# Patient Record
Sex: MALE | Race: WHITE | NOT HISPANIC OR LATINO | URBAN - METROPOLITAN AREA
[De-identification: names, ages, dates, MRNs, and addresses within clinical notes are randomized per-mention and may not be internally consistent; named-entity substitution may affect disease eponyms.]

---

## 2017-03-20 ENCOUNTER — OUTPATIENT (OUTPATIENT)
Dept: OUTPATIENT SERVICES | Facility: HOSPITAL | Age: 77
LOS: 1 days | Discharge: HOME | End: 2017-03-20

## 2019-03-12 ENCOUNTER — OUTPATIENT (OUTPATIENT)
Dept: OUTPATIENT SERVICES | Facility: HOSPITAL | Age: 79
LOS: 1 days | End: 2019-03-12
Payer: MEDICARE

## 2019-03-12 PROCEDURE — 93010 ELECTROCARDIOGRAM REPORT: CPT

## 2019-03-25 ENCOUNTER — OUTPATIENT (OUTPATIENT)
Dept: OUTPATIENT SERVICES | Facility: HOSPITAL | Age: 79
LOS: 1 days | End: 2019-03-25

## 2021-10-25 ENCOUNTER — APPOINTMENT (OUTPATIENT)
Dept: OPHTHALMOLOGY | Facility: CLINIC | Age: 81
End: 2021-10-25

## 2022-02-01 ENCOUNTER — INPATIENT (INPATIENT)
Facility: HOSPITAL | Age: 82
LOS: 1 days | Discharge: ANOTHER HOSPITAL | End: 2022-02-01
Payer: MEDICARE

## 2022-02-01 ENCOUNTER — OUTPATIENT (OUTPATIENT)
Dept: OUTPATIENT SERVICES | Facility: HOSPITAL | Age: 82
LOS: 1 days | End: 2022-02-01

## 2022-02-01 DIAGNOSIS — I71.4 ABDOMINAL AORTIC ANEURYSM, WITHOUT RUPTURE: ICD-10-CM

## 2022-02-01 DIAGNOSIS — I48.91 UNSPECIFIED ATRIAL FIBRILLATION: ICD-10-CM

## 2022-02-01 DIAGNOSIS — F10.10 ALCOHOL ABUSE, UNCOMPLICATED: ICD-10-CM

## 2022-02-01 DIAGNOSIS — R77.8 OTHER SPECIFIED ABNORMALITIES OF PLASMA PROTEINS: ICD-10-CM

## 2022-02-01 DIAGNOSIS — K72.10 CHRONIC HEPATIC FAILURE WITHOUT COMA: ICD-10-CM

## 2022-02-01 DIAGNOSIS — I25.10 ATHEROSCLEROTIC HEART DISEASE OF NATIVE CORONARY ARTERY WITHOUT ANGINA PECTORIS: ICD-10-CM

## 2022-02-01 DIAGNOSIS — Z79.82 LONG TERM (CURRENT) USE OF ASPIRIN: ICD-10-CM

## 2022-02-01 DIAGNOSIS — I42.6 ALCOHOLIC CARDIOMYOPATHY: ICD-10-CM

## 2022-02-01 DIAGNOSIS — K80.20 CALCULUS OF GALLBLADDER WITHOUT CHOLECYSTITIS WITHOUT OBSTRUCTION: ICD-10-CM

## 2022-02-01 DIAGNOSIS — R18.8 OTHER ASCITES: ICD-10-CM

## 2022-02-01 DIAGNOSIS — E83.42 HYPOMAGNESEMIA: ICD-10-CM

## 2022-02-01 DIAGNOSIS — I27.20 PULMONARY HYPERTENSION, UNSPECIFIED: ICD-10-CM

## 2022-02-01 DIAGNOSIS — Z20.822 CONTACT WITH AND (SUSPECTED) EXPOSURE TO COVID-19: ICD-10-CM

## 2022-02-01 DIAGNOSIS — E87.1 HYPO-OSMOLALITY AND HYPONATREMIA: ICD-10-CM

## 2022-02-01 DIAGNOSIS — E87.5 HYPERKALEMIA: ICD-10-CM

## 2022-02-01 DIAGNOSIS — K70.9 ALCOHOLIC LIVER DISEASE, UNSPECIFIED: ICD-10-CM

## 2022-02-01 DIAGNOSIS — Z51.5 ENCOUNTER FOR PALLIATIVE CARE: ICD-10-CM

## 2022-02-01 DIAGNOSIS — I25.2 OLD MYOCARDIAL INFARCTION: ICD-10-CM

## 2022-02-01 DIAGNOSIS — Z82.49 FAMILY HISTORY OF ISCHEMIC HEART DISEASE AND OTHER DISEASES OF THE CIRCULATORY SYSTEM: ICD-10-CM

## 2022-02-01 DIAGNOSIS — N17.9 ACUTE KIDNEY FAILURE, UNSPECIFIED: ICD-10-CM

## 2022-02-01 DIAGNOSIS — R57.0 CARDIOGENIC SHOCK: ICD-10-CM

## 2022-02-01 DIAGNOSIS — I11.0 HYPERTENSIVE HEART DISEASE WITH HEART FAILURE: ICD-10-CM

## 2022-02-01 DIAGNOSIS — I50.32 CHRONIC DIASTOLIC (CONGESTIVE) HEART FAILURE: ICD-10-CM

## 2022-02-01 DIAGNOSIS — I48.92 UNSPECIFIED ATRIAL FLUTTER: ICD-10-CM

## 2022-02-01 DIAGNOSIS — K72.00 ACUTE AND SUBACUTE HEPATIC FAILURE WITHOUT COMA: ICD-10-CM

## 2022-02-01 DIAGNOSIS — R91.8 OTHER NONSPECIFIC ABNORMAL FINDING OF LUNG FIELD: ICD-10-CM

## 2022-02-01 DIAGNOSIS — Z87.891 PERSONAL HISTORY OF NICOTINE DEPENDENCE: ICD-10-CM

## 2022-02-01 DIAGNOSIS — Z96.653 PRESENCE OF ARTIFICIAL KNEE JOINT, BILATERAL: ICD-10-CM

## 2022-02-01 DIAGNOSIS — H35.30 UNSPECIFIED MACULAR DEGENERATION: ICD-10-CM

## 2022-02-01 DIAGNOSIS — D75.1 SECONDARY POLYCYTHEMIA: ICD-10-CM

## 2022-02-01 PROCEDURE — 71250 CT THORAX DX C-: CPT | Mod: 26

## 2022-02-01 PROCEDURE — 74176 CT ABD & PELVIS W/O CONTRAST: CPT | Mod: 26

## 2022-02-01 PROCEDURE — 93010 ELECTROCARDIOGRAM REPORT: CPT

## 2022-02-01 PROCEDURE — 99285 EMERGENCY DEPT VISIT HI MDM: CPT | Mod: CS

## 2022-02-01 PROCEDURE — 71045 X-RAY EXAM CHEST 1 VIEW: CPT | Mod: 26

## 2022-02-01 PROCEDURE — 76705 ECHO EXAM OF ABDOMEN: CPT | Mod: 26

## 2022-02-02 ENCOUNTER — INPATIENT (INPATIENT)
Facility: HOSPITAL | Age: 82
LOS: 7 days | Discharge: HOME | DRG: 270 | End: 2022-02-10
Attending: HOSPITALIST | Admitting: HOSPITALIST
Payer: MEDICARE

## 2022-02-02 ENCOUNTER — OUTPATIENT (OUTPATIENT)
Dept: OUTPATIENT SERVICES | Facility: HOSPITAL | Age: 82
LOS: 1 days | End: 2022-02-02

## 2022-02-02 VITALS
WEIGHT: 169.54 LBS | HEIGHT: 70 IN | DIASTOLIC BLOOD PRESSURE: 71 MMHG | HEART RATE: 82 BPM | RESPIRATION RATE: 16 BRPM | TEMPERATURE: 98 F | SYSTOLIC BLOOD PRESSURE: 103 MMHG | OXYGEN SATURATION: 96 %

## 2022-02-02 DIAGNOSIS — R57.0 CARDIOGENIC SHOCK: ICD-10-CM

## 2022-02-02 LAB
A1C WITH ESTIMATED AVERAGE GLUCOSE RESULT: 5.2 % — SIGNIFICANT CHANGE UP (ref 4–5.6)
ALBUMIN SERPL ELPH-MCNC: 3.5 G/DL — SIGNIFICANT CHANGE UP (ref 3.3–5.2)
ALP SERPL-CCNC: 126 U/L — HIGH (ref 40–120)
ALT FLD-CCNC: 2445 U/L — HIGH
ANION GAP SERPL CALC-SCNC: 16 MMOL/L — SIGNIFICANT CHANGE UP (ref 5–17)
APTT BLD: 83.6 SEC — HIGH (ref 27.5–35.5)
AST SERPL-CCNC: >7000 U/L — HIGH
BASE EXCESS BLDV CALC-SCNC: 0 MMOL/L — SIGNIFICANT CHANGE UP (ref -2–3)
BILIRUB SERPL-MCNC: 5 MG/DL — HIGH (ref 0.4–2)
BLD GP AB SCN SERPL QL: SIGNIFICANT CHANGE UP
BLOOD GAS COMMENTS, VENOUS: SIGNIFICANT CHANGE UP
BUN SERPL-MCNC: 60.8 MG/DL — HIGH (ref 8–20)
CA-I SERPL-SCNC: 1.01 MMOL/L — LOW (ref 1.15–1.33)
CALCIUM SERPL-MCNC: 7.8 MG/DL — LOW (ref 8.6–10.2)
CHLORIDE BLDV-SCNC: 92 MMOL/L — LOW (ref 98–107)
CHLORIDE SERPL-SCNC: 92 MMOL/L — LOW (ref 98–107)
CO2 SERPL-SCNC: 21 MMOL/L — LOW (ref 22–29)
CREAT SERPL-MCNC: 2.22 MG/DL — HIGH (ref 0.5–1.3)
ESTIMATED AVERAGE GLUCOSE: 103 MG/DL — SIGNIFICANT CHANGE UP (ref 68–114)
GAS PNL BLDV: 127 MMOL/L — LOW (ref 136–145)
GAS PNL BLDV: SIGNIFICANT CHANGE UP
GAS PNL BLDV: SIGNIFICANT CHANGE UP
GLUCOSE BLDV-MCNC: 92 MG/DL — SIGNIFICANT CHANGE UP (ref 70–99)
GLUCOSE SERPL-MCNC: 92 MG/DL — SIGNIFICANT CHANGE UP (ref 70–99)
HCO3 BLDV-SCNC: 25 MMOL/L — SIGNIFICANT CHANGE UP (ref 22–29)
HCT VFR BLD CALC: 44.3 % — SIGNIFICANT CHANGE UP (ref 39–50)
HCT VFR BLDA CALC: 48 % — SIGNIFICANT CHANGE UP (ref 39–51)
HGB BLD CALC-MCNC: 15.9 G/DL — SIGNIFICANT CHANGE UP (ref 12.6–17.4)
HGB BLD-MCNC: 15.5 G/DL — SIGNIFICANT CHANGE UP (ref 13–17)
HOROWITZ INDEX BLDV+IHG-RTO: 0.21 — SIGNIFICANT CHANGE UP
INR BLD: 3.17 RATIO — HIGH (ref 0.88–1.16)
LACTATE BLDV-MCNC: 1.6 MMOL/L — SIGNIFICANT CHANGE UP (ref 0.5–2)
LACTATE SERPL-SCNC: 1.7 MMOL/L — SIGNIFICANT CHANGE UP (ref 0.5–2)
LIDOCAIN IGE QN: 258 U/L — HIGH (ref 22–51)
MAGNESIUM SERPL-MCNC: 2.2 MG/DL — SIGNIFICANT CHANGE UP (ref 1.6–2.6)
MCHC RBC-ENTMCNC: 32.2 PG — SIGNIFICANT CHANGE UP (ref 27–34)
MCHC RBC-ENTMCNC: 35 GM/DL — SIGNIFICANT CHANGE UP (ref 32–36)
MCV RBC AUTO: 91.9 FL — SIGNIFICANT CHANGE UP (ref 80–100)
PCO2 BLDV: 40 MMHG — LOW (ref 42–55)
PH BLDV: 7.4 — SIGNIFICANT CHANGE UP (ref 7.32–7.43)
PHOSPHATE SERPL-MCNC: 5.9 MG/DL — HIGH (ref 2.4–4.7)
PLATELET # BLD AUTO: 86 K/UL — LOW (ref 150–400)
PO2 BLDV: <42 MMHG — SIGNIFICANT CHANGE UP (ref 25–45)
POTASSIUM BLDV-SCNC: 3.5 MMOL/L — SIGNIFICANT CHANGE UP (ref 3.5–5.1)
POTASSIUM SERPL-MCNC: 3.8 MMOL/L — SIGNIFICANT CHANGE UP (ref 3.5–5.3)
POTASSIUM SERPL-SCNC: 3.8 MMOL/L — SIGNIFICANT CHANGE UP (ref 3.5–5.3)
PROT SERPL-MCNC: 5.6 G/DL — LOW (ref 6.6–8.7)
PROTHROM AB SERPL-ACNC: 34.8 SEC — HIGH (ref 10.6–13.6)
RBC # BLD: 4.82 M/UL — SIGNIFICANT CHANGE UP (ref 4.2–5.8)
RBC # FLD: 12.6 % — SIGNIFICANT CHANGE UP (ref 10.3–14.5)
SAO2 % BLDV: 68.6 % — SIGNIFICANT CHANGE UP
SODIUM SERPL-SCNC: 129 MMOL/L — LOW (ref 135–145)
TSH SERPL-MCNC: 1.4 UIU/ML — SIGNIFICANT CHANGE UP (ref 0.27–4.2)
WBC # BLD: 9.82 K/UL — SIGNIFICANT CHANGE UP (ref 3.8–10.5)
WBC # FLD AUTO: 9.82 K/UL — SIGNIFICANT CHANGE UP (ref 3.8–10.5)

## 2022-02-02 PROCEDURE — 33967 INSERT I-AORT PERCUT DEVICE: CPT

## 2022-02-02 PROCEDURE — 93010 ELECTROCARDIOGRAM REPORT: CPT

## 2022-02-02 PROCEDURE — 99223 1ST HOSP IP/OBS HIGH 75: CPT

## 2022-02-02 PROCEDURE — 93460 R&L HRT ART/VENTRICLE ANGIO: CPT | Mod: 26

## 2022-02-02 PROCEDURE — 71045 X-RAY EXAM CHEST 1 VIEW: CPT | Mod: 26

## 2022-02-02 PROCEDURE — 76770 US EXAM ABDO BACK WALL COMP: CPT | Mod: 26

## 2022-02-02 PROCEDURE — 93306 TTE W/DOPPLER COMPLETE: CPT | Mod: 26

## 2022-02-02 PROCEDURE — 93970 EXTREMITY STUDY: CPT | Mod: 26

## 2022-02-02 RX ORDER — THIAMINE MONONITRATE (VIT B1) 100 MG
100 TABLET ORAL DAILY
Refills: 0 | Status: DISCONTINUED | OUTPATIENT
Start: 2022-02-02 | End: 2022-02-10

## 2022-02-02 RX ORDER — CHLORHEXIDINE GLUCONATE 213 G/1000ML
1 SOLUTION TOPICAL
Refills: 0 | Status: DISCONTINUED | OUTPATIENT
Start: 2022-02-02 | End: 2022-02-02

## 2022-02-02 RX ORDER — MILRINONE LACTATE 1 MG/ML
0.12 INJECTION, SOLUTION INTRAVENOUS
Qty: 20 | Refills: 0 | Status: DISCONTINUED | OUTPATIENT
Start: 2022-02-02 | End: 2022-02-05

## 2022-02-02 RX ORDER — FOLIC ACID 0.8 MG
1 TABLET ORAL DAILY
Refills: 0 | Status: DISCONTINUED | OUTPATIENT
Start: 2022-02-02 | End: 2022-02-10

## 2022-02-02 RX ORDER — HEPARIN SODIUM 5000 [USP'U]/ML
1000 INJECTION INTRAVENOUS; SUBCUTANEOUS
Qty: 25000 | Refills: 0 | Status: DISCONTINUED | OUTPATIENT
Start: 2022-02-02 | End: 2022-02-04

## 2022-02-02 RX ORDER — CHLORHEXIDINE GLUCONATE 213 G/1000ML
1 SOLUTION TOPICAL DAILY
Refills: 0 | Status: DISCONTINUED | OUTPATIENT
Start: 2022-02-02 | End: 2022-02-10

## 2022-02-02 RX ADMIN — HEPARIN SODIUM 10 UNIT(S)/HR: 5000 INJECTION INTRAVENOUS; SUBCUTANEOUS at 22:23

## 2022-02-02 RX ADMIN — Medication 25 MILLIGRAM(S): at 22:20

## 2022-02-02 RX ADMIN — MILRINONE LACTATE 2.81 MICROGRAM(S)/KG/MIN: 1 INJECTION, SOLUTION INTRAVENOUS at 22:21

## 2022-02-02 NOTE — H&P ADULT - NSHPPHYSICALEXAM_GEN_ALL_CORE
General: Elderly gentleman, resting comfortably.  Eyes: PERRL.  Neck: No JVD.  Heart: Irregularly irregular.  Lungs: Clear to auscultation b/l.  Abdomen: Soft, nontender, nondistended.  Skin: Warm, dry, intact.  Extremities: No edema. Right groin IABP insertion site soft, nontender, w/o evidence of hematoma.  Neuro: A&Ox3, interactive, nonfocal.

## 2022-02-02 NOTE — H&P ADULT - ATTENDING COMMENTS
81M w/ PMHx HTN, EtOH abuse presented to [facility] on 02/01 w/ exertional dyspnea, CP and found to be in Aflutter w/ RVR. Underwent LHC which revealed 70% mLAD ? POBA w/ low EF10-15%. IABP and [place] inserted. CI ~1.8. Milrinone infusion initiated and transferred to [facility].    NSTEMI  Cardiogenic shock   Ischemic cardiomyopathy  Decompensated heart failure   Pulmonary edema   Shock liver/ JOON   Aflutter w/ RVR   EtOH abuse     Needs EP and heart failure evaluation   Continue Milrinone @ 0.125mcg (CI ~ 2.2, SvO2 69%, and LA 1.7)   Remain on IABP 1:1, w/ augmented pressures in low 100s  Continue Hep gtt  PRN Lasix as tolerated hemodynamically, will need pressors if lasix gtt is to be tolerated   Started on librium taper

## 2022-02-02 NOTE — H&P ADULT - HISTORY OF PRESENT ILLNESS
80 y/o M w/ a PMHx of EtOH abuse, HTN, and macular degeneration who presented to [facility] ER on 2/1 c/o weakness, dyspnea on exertion, intermittent chest pain, and decreased PO intake. Pt was instructed to go to the ER after outpatient EKG revealed a flutter w/ RVR. Underwent cardiac catheterization which was revealing of EF 10-15% and 70% mid LAD occlusion. IABP placed. Pt was transferred to [facility] for continuation of care.  82 y/o M w/ a PMHx of EtOH abuse, HTN, and macular degeneration who presented to [facility] ER on 2/1 c/o weakness, dyspnea on exertion, intermittent chest pain, and decreased PO intake. Pt was instructed to go to the ER after outpatient EKG revealed a flutter w/ RVR. Underwent cardiac catheterization which was revealing of EF 10-15% and 70% mid LAD occlusion. IABP placed. Evart tejas catheter inserted. Cardiac index 1.8. Milrinone infusion initiated at 0.125mcg. Pt was transferred to [facility] for continuation of care.

## 2022-02-02 NOTE — H&P ADULT - NSHPLABSRESULTS_GEN_ALL_CORE
15.5   9.82  )-----------( 86       ( 02 Feb 2022 20:50 )             44.3     02-02    129<L>  |  92<L>  |  60.8<H>  ----------------------------<  92  3.8   |  21.0<L>  |  2.22<H>    Ca    7.8<L>      02 Feb 2022 20:50  Phos  5.9     02-02  Mg     2.2     02-02    TPro  5.6<L>  /  Alb  3.5  /  TBili  5.0<H>  /  DBili  x   /  AST  >7000<H>  /  ALT  2445<H>  /  AlkPhos  126<H>  02-02

## 2022-02-02 NOTE — H&P ADULT - ASSESSMENT
82 y/o M w/ a PMHx of EtOH abuse, HTN, and macular degeneration admitted w/:    1. Cardiogenic shock  2. A fib w/ RVR   80 y/o M w/ a PMHx of EtOH abuse, HTN, and macular degeneration admitted w/:    1. Cardiogenic shock, bi ventricular failure  2. A fib w/ RVR  3. Nonischemic cardiomyopathy  4. JOON  5. Metabolic acidosis  6. Transaminitis    PLAN:  - S/p cardiac catheterization revealing of severely reduced EF (10-15%). Mid LAD 70% occluded, but heart failure out of proportion for this to be the cause.  - IABP 1:1 for mechanical support.   - Hemodynamically stable. Maintain augmented pressure > 100. Milrinone infusion running at 0.125mcg. May add levophed if vasopressor support is needed.  - Send SvO2. May increase milrinone to 0.25mcg if < 70%.  - EP consult.  - Respiratory status stable.  - Trend renal function. Maki in place. Monitor urine output. Replace electrolytes as needed.  - Maintain K > 4 and Mg > 2 for optimal arrythmia suppression.  - Lactate negative.  - Transaminitis 2/2 shock liver. Trend LFTs.  - DASH/TLC diet as tolerated.  - No sign of active infection. Monitor off abx.  - Fixed dose heparin infusion while IABP in place.  - At risk for development of EtOH withdrawal. CIWA  protocol. Low dose librium taper x 3 days.  - Thiamine, folic acid, and multivitamin.    Case discussed w/ ICU attending, Dr. Donaldson.    CRITICAL CARE TIME SPENT: 45 minutes

## 2022-02-02 NOTE — PATIENT PROFILE ADULT - FALL HARM RISK - HARM RISK INTERVENTIONS

## 2022-02-03 DIAGNOSIS — F10.10 ALCOHOL ABUSE, UNCOMPLICATED: ICD-10-CM

## 2022-02-03 DIAGNOSIS — I25.10 ATHEROSCLEROTIC HEART DISEASE OF NATIVE CORONARY ARTERY WITHOUT ANGINA PECTORIS: ICD-10-CM

## 2022-02-03 DIAGNOSIS — R74.01 ELEVATION OF LEVELS OF LIVER TRANSAMINASE LEVELS: ICD-10-CM

## 2022-02-03 DIAGNOSIS — I48.91 UNSPECIFIED ATRIAL FIBRILLATION: ICD-10-CM

## 2022-02-03 DIAGNOSIS — R57.0 CARDIOGENIC SHOCK: ICD-10-CM

## 2022-02-03 LAB
ABO RH CONFIRMATION: SIGNIFICANT CHANGE UP
ALBUMIN SERPL ELPH-MCNC: 3.2 G/DL — LOW (ref 3.3–5.2)
ALBUMIN SERPL ELPH-MCNC: 3.3 G/DL — SIGNIFICANT CHANGE UP (ref 3.3–5.2)
ALBUMIN SERPL ELPH-MCNC: 3.6 G/DL — SIGNIFICANT CHANGE UP (ref 3.3–5.2)
ALP SERPL-CCNC: 117 U/L — SIGNIFICANT CHANGE UP (ref 40–120)
ALP SERPL-CCNC: 128 U/L — HIGH (ref 40–120)
ALP SERPL-CCNC: 131 U/L — HIGH (ref 40–120)
ALT FLD-CCNC: 1741 U/L — HIGH
ALT FLD-CCNC: 2038 U/L — HIGH
ALT FLD-CCNC: 2079 U/L — HIGH
AMMONIA BLD-MCNC: 42 UMOL/L — SIGNIFICANT CHANGE UP (ref 11–55)
ANION GAP SERPL CALC-SCNC: 15 MMOL/L — SIGNIFICANT CHANGE UP (ref 5–17)
ANION GAP SERPL CALC-SCNC: 16 MMOL/L — SIGNIFICANT CHANGE UP (ref 5–17)
ANION GAP SERPL CALC-SCNC: 16 MMOL/L — SIGNIFICANT CHANGE UP (ref 5–17)
APTT BLD: 105.4 SEC — HIGH (ref 27.5–35.5)
APTT BLD: 94 SEC — HIGH (ref 27.5–35.5)
AST SERPL-CCNC: 2946 U/L — HIGH
AST SERPL-CCNC: 4265 U/L — HIGH
AST SERPL-CCNC: 5311 U/L — HIGH
BILIRUB SERPL-MCNC: 4.7 MG/DL — HIGH (ref 0.4–2)
BILIRUB SERPL-MCNC: 5.3 MG/DL — HIGH (ref 0.4–2)
BILIRUB SERPL-MCNC: 5.4 MG/DL — HIGH (ref 0.4–2)
BLOOD GAS COMMENTS, VENOUS: SIGNIFICANT CHANGE UP
BUN SERPL-MCNC: 37.4 MG/DL — HIGH (ref 8–20)
BUN SERPL-MCNC: 49.8 MG/DL — HIGH (ref 8–20)
BUN SERPL-MCNC: 54.6 MG/DL — HIGH (ref 8–20)
CA-I SERPL-SCNC: 1.03 MMOL/L — LOW (ref 1.15–1.33)
CA-I SERPL-SCNC: 1.06 MMOL/L — LOW (ref 1.15–1.33)
CALCIUM SERPL-MCNC: 7.7 MG/DL — LOW (ref 8.6–10.2)
CALCIUM SERPL-MCNC: 8.1 MG/DL — LOW (ref 8.6–10.2)
CALCIUM SERPL-MCNC: 8.3 MG/DL — LOW (ref 8.6–10.2)
CHLORIDE BLDV-SCNC: 92 MMOL/L — LOW (ref 98–107)
CHLORIDE BLDV-SCNC: 94 MMOL/L — LOW (ref 98–107)
CHLORIDE SERPL-SCNC: 93 MMOL/L — LOW (ref 98–107)
CHLORIDE SERPL-SCNC: 94 MMOL/L — LOW (ref 98–107)
CHLORIDE SERPL-SCNC: 95 MMOL/L — LOW (ref 98–107)
CO2 SERPL-SCNC: 22 MMOL/L — SIGNIFICANT CHANGE UP (ref 22–29)
CO2 SERPL-SCNC: 24 MMOL/L — SIGNIFICANT CHANGE UP (ref 22–29)
CO2 SERPL-SCNC: 27 MMOL/L — SIGNIFICANT CHANGE UP (ref 22–29)
CREAT SERPL-MCNC: 1.02 MG/DL — SIGNIFICANT CHANGE UP (ref 0.5–1.3)
CREAT SERPL-MCNC: 1.27 MG/DL — SIGNIFICANT CHANGE UP (ref 0.5–1.3)
CREAT SERPL-MCNC: 1.58 MG/DL — HIGH (ref 0.5–1.3)
GAS PNL BLDV: 129 MMOL/L — LOW (ref 136–145)
GAS PNL BLDV: 131 MMOL/L — LOW (ref 136–145)
GAS PNL BLDV: SIGNIFICANT CHANGE UP
GLUCOSE BLDV-MCNC: 88 MG/DL — SIGNIFICANT CHANGE UP (ref 70–99)
GLUCOSE BLDV-MCNC: 90 MG/DL — SIGNIFICANT CHANGE UP (ref 70–99)
GLUCOSE SERPL-MCNC: 118 MG/DL — HIGH (ref 70–99)
GLUCOSE SERPL-MCNC: 81 MG/DL — SIGNIFICANT CHANGE UP (ref 70–99)
GLUCOSE SERPL-MCNC: 99 MG/DL — SIGNIFICANT CHANGE UP (ref 70–99)
HCO3 BLDV-SCNC: 29 MMOL/L — SIGNIFICANT CHANGE UP (ref 22–29)
HCO3 BLDV-SCNC: 29 MMOL/L — SIGNIFICANT CHANGE UP (ref 22–29)
HCT VFR BLD CALC: 43 % — SIGNIFICANT CHANGE UP (ref 39–50)
HCT VFR BLD CALC: 46.5 % — SIGNIFICANT CHANGE UP (ref 39–50)
HCT VFR BLDA CALC: 47 % — SIGNIFICANT CHANGE UP (ref 39–51)
HCT VFR BLDA CALC: 50 % — SIGNIFICANT CHANGE UP (ref 39–51)
HGB BLD CALC-MCNC: 15.6 G/DL — SIGNIFICANT CHANGE UP (ref 12.6–17.4)
HGB BLD CALC-MCNC: 16.6 G/DL — SIGNIFICANT CHANGE UP (ref 12.6–17.4)
HGB BLD-MCNC: 15.2 G/DL — SIGNIFICANT CHANGE UP (ref 13–17)
HGB BLD-MCNC: 16.3 G/DL — SIGNIFICANT CHANGE UP (ref 13–17)
HOROWITZ INDEX BLDV+IHG-RTO: 21 — SIGNIFICANT CHANGE UP
INR BLD: 2.49 RATIO — HIGH (ref 0.88–1.16)
INR BLD: 2.63 RATIO — HIGH (ref 0.88–1.16)
LACTATE BLDV-MCNC: 1.2 MMOL/L — SIGNIFICANT CHANGE UP (ref 0.5–2)
LACTATE BLDV-MCNC: 1.4 MMOL/L — SIGNIFICANT CHANGE UP (ref 0.5–2)
LACTATE SERPL-SCNC: 1.4 MMOL/L — SIGNIFICANT CHANGE UP (ref 0.5–2)
LACTATE SERPL-SCNC: 1.5 MMOL/L — SIGNIFICANT CHANGE UP (ref 0.5–2)
MAGNESIUM SERPL-MCNC: 1.7 MG/DL — SIGNIFICANT CHANGE UP (ref 1.6–2.6)
MAGNESIUM SERPL-MCNC: 1.8 MG/DL — SIGNIFICANT CHANGE UP (ref 1.6–2.6)
MAGNESIUM SERPL-MCNC: 1.8 MG/DL — SIGNIFICANT CHANGE UP (ref 1.6–2.6)
MCHC RBC-ENTMCNC: 31.4 PG — SIGNIFICANT CHANGE UP (ref 27–34)
MCHC RBC-ENTMCNC: 31.8 PG — SIGNIFICANT CHANGE UP (ref 27–34)
MCHC RBC-ENTMCNC: 35.1 GM/DL — SIGNIFICANT CHANGE UP (ref 32–36)
MCHC RBC-ENTMCNC: 35.3 GM/DL — SIGNIFICANT CHANGE UP (ref 32–36)
MCV RBC AUTO: 89.6 FL — SIGNIFICANT CHANGE UP (ref 80–100)
MCV RBC AUTO: 90 FL — SIGNIFICANT CHANGE UP (ref 80–100)
NT-PROBNP SERPL-SCNC: 2970 PG/ML — HIGH (ref 0–300)
PCO2 BLDV: 42 MMHG — SIGNIFICANT CHANGE UP (ref 42–55)
PCO2 BLDV: 43 MMHG — SIGNIFICANT CHANGE UP (ref 42–55)
PH BLDV: 7.43 — SIGNIFICANT CHANGE UP (ref 7.32–7.43)
PHOSPHATE SERPL-MCNC: 2.6 MG/DL — SIGNIFICANT CHANGE UP (ref 2.4–4.7)
PHOSPHATE SERPL-MCNC: 2.7 MG/DL — SIGNIFICANT CHANGE UP (ref 2.4–4.7)
PHOSPHATE SERPL-MCNC: 3.6 MG/DL — SIGNIFICANT CHANGE UP (ref 2.4–4.7)
PLATELET # BLD AUTO: 62 K/UL — LOW (ref 150–400)
PLATELET # BLD AUTO: 90 K/UL — LOW (ref 150–400)
PO2 BLDV: <42 MMHG — SIGNIFICANT CHANGE UP (ref 25–45)
PO2 BLDV: <42 MMHG — SIGNIFICANT CHANGE UP (ref 25–45)
POTASSIUM BLDV-SCNC: 3.5 MMOL/L — SIGNIFICANT CHANGE UP (ref 3.5–5.1)
POTASSIUM BLDV-SCNC: 3.6 MMOL/L — SIGNIFICANT CHANGE UP (ref 3.5–5.1)
POTASSIUM SERPL-MCNC: 3.5 MMOL/L — SIGNIFICANT CHANGE UP (ref 3.5–5.3)
POTASSIUM SERPL-MCNC: 3.7 MMOL/L — SIGNIFICANT CHANGE UP (ref 3.5–5.3)
POTASSIUM SERPL-MCNC: 4 MMOL/L — SIGNIFICANT CHANGE UP (ref 3.5–5.3)
POTASSIUM SERPL-SCNC: 3.5 MMOL/L — SIGNIFICANT CHANGE UP (ref 3.5–5.3)
POTASSIUM SERPL-SCNC: 3.7 MMOL/L — SIGNIFICANT CHANGE UP (ref 3.5–5.3)
POTASSIUM SERPL-SCNC: 4 MMOL/L — SIGNIFICANT CHANGE UP (ref 3.5–5.3)
PROT SERPL-MCNC: 5.2 G/DL — LOW (ref 6.6–8.7)
PROT SERPL-MCNC: 5.6 G/DL — LOW (ref 6.6–8.7)
PROT SERPL-MCNC: 5.7 G/DL — LOW (ref 6.6–8.7)
PROTHROM AB SERPL-ACNC: 27.7 SEC — HIGH (ref 10.6–13.6)
PROTHROM AB SERPL-ACNC: 29.1 SEC — HIGH (ref 10.6–13.6)
RBC # BLD: 4.78 M/UL — SIGNIFICANT CHANGE UP (ref 4.2–5.8)
RBC # BLD: 5.19 M/UL — SIGNIFICANT CHANGE UP (ref 4.2–5.8)
RBC # FLD: 12.3 % — SIGNIFICANT CHANGE UP (ref 10.3–14.5)
RBC # FLD: 12.3 % — SIGNIFICANT CHANGE UP (ref 10.3–14.5)
SAO2 % BLDV: 70.4 % — SIGNIFICANT CHANGE UP
SAO2 % BLDV: 72.5 % — SIGNIFICANT CHANGE UP
SODIUM SERPL-SCNC: 132 MMOL/L — LOW (ref 135–145)
SODIUM SERPL-SCNC: 133 MMOL/L — LOW (ref 135–145)
SODIUM SERPL-SCNC: 137 MMOL/L — SIGNIFICANT CHANGE UP (ref 135–145)
TROPONIN T SERPL-MCNC: 0.27 NG/ML — HIGH (ref 0–0.06)
WBC # BLD: 6.52 K/UL — SIGNIFICANT CHANGE UP (ref 3.8–10.5)
WBC # BLD: 9.79 K/UL — SIGNIFICANT CHANGE UP (ref 3.8–10.5)
WBC # FLD AUTO: 6.52 K/UL — SIGNIFICANT CHANGE UP (ref 3.8–10.5)
WBC # FLD AUTO: 9.79 K/UL — SIGNIFICANT CHANGE UP (ref 3.8–10.5)

## 2022-02-03 PROCEDURE — 99223 1ST HOSP IP/OBS HIGH 75: CPT

## 2022-02-03 PROCEDURE — 93306 TTE W/DOPPLER COMPLETE: CPT | Mod: 26

## 2022-02-03 RX ORDER — POTASSIUM CHLORIDE 20 MEQ
40 PACKET (EA) ORAL ONCE
Refills: 0 | Status: COMPLETED | OUTPATIENT
Start: 2022-02-03 | End: 2022-02-03

## 2022-02-03 RX ORDER — NOREPINEPHRINE BITARTRATE/D5W 8 MG/250ML
0.05 PLASTIC BAG, INJECTION (ML) INTRAVENOUS
Qty: 8 | Refills: 0 | Status: DISCONTINUED | OUTPATIENT
Start: 2022-02-03 | End: 2022-02-05

## 2022-02-03 RX ORDER — ASPIRIN/CALCIUM CARB/MAGNESIUM 324 MG
81 TABLET ORAL DAILY
Refills: 0 | Status: DISCONTINUED | OUTPATIENT
Start: 2022-02-03 | End: 2022-02-06

## 2022-02-03 RX ORDER — SODIUM,POTASSIUM PHOSPHATES 278-250MG
1 POWDER IN PACKET (EA) ORAL ONCE
Refills: 0 | Status: COMPLETED | OUTPATIENT
Start: 2022-02-03 | End: 2022-02-03

## 2022-02-03 RX ORDER — POTASSIUM CHLORIDE 20 MEQ
10 PACKET (EA) ORAL
Refills: 0 | Status: COMPLETED | OUTPATIENT
Start: 2022-02-03 | End: 2022-02-03

## 2022-02-03 RX ORDER — MAGNESIUM SULFATE 500 MG/ML
1 VIAL (ML) INJECTION ONCE
Refills: 0 | Status: COMPLETED | OUTPATIENT
Start: 2022-02-03 | End: 2022-02-03

## 2022-02-03 RX ORDER — FUROSEMIDE 40 MG
40 TABLET ORAL EVERY 12 HOURS
Refills: 0 | Status: DISCONTINUED | OUTPATIENT
Start: 2022-02-03 | End: 2022-02-04

## 2022-02-03 RX ADMIN — Medication 81 MILLIGRAM(S): at 13:27

## 2022-02-03 RX ADMIN — Medication 1 MILLIGRAM(S): at 12:18

## 2022-02-03 RX ADMIN — Medication 100 MILLIEQUIVALENT(S): at 00:40

## 2022-02-03 RX ADMIN — Medication 40 MILLIGRAM(S): at 05:09

## 2022-02-03 RX ADMIN — MILRINONE LACTATE 5.63 MICROGRAM(S)/KG/MIN: 1 INJECTION, SOLUTION INTRAVENOUS at 15:02

## 2022-02-03 RX ADMIN — Medication 7.21 MICROGRAM(S)/KG/MIN: at 22:45

## 2022-02-03 RX ADMIN — CHLORHEXIDINE GLUCONATE 1 APPLICATION(S): 213 SOLUTION TOPICAL at 12:18

## 2022-02-03 RX ADMIN — Medication 100 GRAM(S): at 13:27

## 2022-02-03 RX ADMIN — Medication 100 MILLIEQUIVALENT(S): at 01:59

## 2022-02-03 RX ADMIN — Medication 1 TABLET(S): at 12:17

## 2022-02-03 RX ADMIN — Medication 40 MILLIGRAM(S): at 17:45

## 2022-02-03 RX ADMIN — Medication 7.21 MICROGRAM(S)/KG/MIN: at 00:58

## 2022-02-03 RX ADMIN — Medication 100 MILLIGRAM(S): at 12:17

## 2022-02-03 RX ADMIN — HEPARIN SODIUM 10 UNIT(S)/HR: 5000 INJECTION INTRAVENOUS; SUBCUTANEOUS at 15:02

## 2022-02-03 RX ADMIN — Medication 100 MILLIEQUIVALENT(S): at 02:59

## 2022-02-03 RX ADMIN — Medication 40 MILLIEQUIVALENT(S): at 13:27

## 2022-02-03 RX ADMIN — Medication 1 TABLET(S): at 13:27

## 2022-02-03 NOTE — CONSULT NOTE ADULT - ATTENDING COMMENTS
Pt is seen, examined, chart reviewed, d/w np/pa.  Management as outlined above.  Cardiogenic Shock with Decompensated HFrEF:  EF 10-15% at [facility], official echo here pending.   Continue IABP 1:1 at this time, continue heparin gtt.  Continue milrinone, advance as tolerated. Cont levophed.   Continue lasix 40mg IV BID.   HFT Consulted.  CAD:  mLAD lesion 70% on LHC, not likely to be cause of cardiogenic shock.   Atrial Fibrillation/Flutter:  EP consult.  [id number] score 5 (LV dysfunction, HTN, age, vascular disease)  On heparin gtt.
Seen and examined, counseled, agree with above unless specified below    1. AFL, now in sinus, will need AC and eventually ablation as rate control is not always feasible for AFL    2. AFIB, now in sinus, sounds like it self converted, counseled. Need to cut on alcohol. Need AC. Would avoid AAT at this stage as the only AAT options are amiodrone which cannot be used due to liver failure and dofetilide which may worsen the bradycardia. Will aim to use low dose BB when cleared for that by cardiology/ICU and HF.    3. Ectopy, rhythm reviewed. lots of PACs, PVCs and salvos of AT. Probably secondary to HF. May interfere with proper IABP function. Will monitor for now. Does not require specific therapy. Likely to improve when inotrope and pressors are weaned down     4. Bradycardia and intermittent/occasional drop P waves. These are most c/w non conducted PACs rather than AVb during sinus, will monitor for now. I suspect he will tolerate BB when safe to do so from a HF stand point. No current indication for pacing support    5. HF/cardiogenic shock. on IABP and milrinone and Levo. Cardiology, HF and ICU are involved. Will consider LifeVest prior to DC and r/a EF in 3 months after rhythm control, alcohol abstinence and GDMT before advising for ICD    Will follow up
Patient is an 81 year old male with past medical history of HLD and active alcohol use who came to the hospital with chest pain and SOB. He was found to be in afib with RVR. Echo was performed which showed severe LV dysfunction. Labs were significant for significantly elevated LFTs and elevated creatinine. A LHC was performed which showed a 70% mLAD lesion but given the degree of his cardiomyopathy is out of proportion to the lesion so it was not stented. A RHC was performed which showed elevated filling pressures and cardiac index of 1.8, with pa sat of 57. An intraaortic IABP was placed and milrinone was started and he was sent over to [facility]. At [facility] on this support, his pa sat improved to 71 and his end organ function improved.   After reviweing the echo, there appears to be some chronicity (LVEDD is 6.1)  IABP wean today had stable PA sat but did have a slightly lower BP. Will attempt to remove IABP tomorrow if HD and end organs continue to improve  Patient is in pAF, EP recs appreciated.  Spoke to patient extensively about needing to quit alcohol, which is likely contributing to his caridomyopathy

## 2022-02-03 NOTE — CONSULT NOTE ADULT - ASSESSMENT
80 y/o male with PMH significant for HTN, EtOH abuse and glaucoma who presented to [facility] on 2/1 with c/o CP and ORELLANA. He was noted to be in atrial flutter w/ RVR. Pt. underwent RHC/LHC which revealed 70% mLAD lesion and LVEF 10-15% with low CI ~1.8. IABP and PA cath placed and patient transferred to [facility] 2/2 for further management of cardiogenic shock.    2/3/22: Pt hemodynamically stable, warm and well perfused with very good UOP response to diuretics. Would attempt to wean pressors and possibly IABP as well. EP consulted for aflutter/afib.    Hemodynamics:    2/3/22: HR 98, BP 92/62/81, augmented 100, CVP 7-9, PAP 38/15/22, TD CO/CI 6.3/3.2, BRAN CO/CI 3.7/1.9

## 2022-02-03 NOTE — CONSULT NOTE ADULT - ASSESSMENT
A/P: 80 y/o M with a PMHx of HTN, macular degeneration, and EtOH abuse who [place] [facility] [place] of chest pain, dyspnea on exertion, and weakness. Patient states that for "a long time" he has been experiencing dyspnea on exertion and orthopnea, but was "ignoring his symptoms." Patient states starting about 10 days ago the symptoms became much worse with some associated chest pressure with exertion as well, weakness, leg swelling, and decreased PO intake. Patient saw his PMD that showed new onset atrial flutter with RVR, and he was sent [place] [facility] Patient underwent a LHC that showed EF 10-15% with mLAD 70% lesion, and had IABP with McIntire Presley catheter placed. Patient was found to have a cardiac index of 1.8, and was started on milrinone 0.125 mcg. Patient was transferred to [facility] for further management. Patient currently has IABP on 1:1 on milrinone and levophed, lasix 40mg IV BID, and negative 5 L. Patient states his breathing is improving, and he feels better then when he initially presented to [facility]. Patient states he has never seen a Cardiologist before or had any previous workup. Patient denies fevers, chills, syncope, near syncope, abdominal pain, N/V/D, headache, or dizziness.     Cardiogenic Shock with Decompensated HFrEF   - EF 10[place]15% at [facility] official [place] here pending.   - Continue IABP 1:1 at this time, continue heparin gtt.  - Continue milrinone, may need to increase to 0.25 mcg for further support.   - Wean levophed as tolerated.   - Continue lasix 40mg IV BID.   - Check troponin and BNP.   - Heart failure consulted.   - Will add GDMT when BP allows.   - Monitor on telemetry.    - Strict i/o and daily weights.    - Keep K > 4, Mg > 2.    - Monitor renal function with ongoing diuresis.    CAD  - mLAD lesion 70% on LHC, not likely to be cause of cardiogenic shock.   - Medical management at this time.   - Start aspirin.   - Unable to start statin due to transaminitis.   - Echo as above.     Atrial Fibrillation  - New onset per patient.   - On milrinone and levophed.   - EP consult.   - [id number] score 5 (LV dysfunction, HTN, age, vascular disease)  - On heparin gtt.     Assessment and recommendations are final when note is signed by the attending.    A/P: 82 y/o M with a PMHx of HTN, macular degeneration, and EtOH abuse who presented to [facility] with complaints of chest pain, dyspnea on exertion, and weakness. Patient states that for "a long time" he has been experiencing dyspnea on exertion and orthopnea, but was "ignoring his symptoms." Patient states starting about 10 days ago the symptoms became much worse with some associated chest pressure with exertion as well, weakness, leg swelling, and decreased PO intake. Patient saw his PMD that showed new onset atrial flutter with RVR, and he was sent to [facility]. Patient underwent a LHC that showed EF 10-15% with mLAD 70% lesion, and had IABP with Tucker Presley catheter placed. Patient was found to have a cardiac index of 1.8, and was started on milrinone 0.125 mcg. Patient was transferred to [facility] for further management. Patient currently has IABP on 1:1 on milrinone and levophed, lasix 40mg IV BID, and negative 5 L. Patient states his breathing is improving, and he feels better then when he initially presented to [facility]. Patient states he has never seen a Cardiologist before or had any previous workup. Patient denies fevers, chills, syncope, near syncope, abdominal pain, N/V/D, headache, or dizziness.     Cardiogenic Shock with Decompensated HFrEF   - EF 10-15% at [facility], official echo here pending.   - Continue IABP 1:1 at this time, continue heparin gtt.  - Continue milrinone, may need to increase to 0.25 mcg for further support.   - Wean levophed as tolerated.   - Continue lasix 40mg IV BID.   - Check troponin and BNP.   - Heart failure consulted.   - Will add GDMT when BP allows.   - Monitor on telemetry.    - Strict i/o and daily weights.    - Keep K > 4, Mg > 2.    - Monitor renal function with ongoing diuresis.    CAD  - mLAD lesion 70% on LHC, not likely to be cause of cardiogenic shock.   - Medical management at this time.   - Start aspirin.   - Unable to start statin due to transaminitis.   - Echo as above.     Atrial Fibrillation/Flutter  - New onset per patient.   - On milrinone and levophed.   - EP consult.   - [id number] score 5 (LV dysfunction, HTN, age, vascular disease)  - On heparin gtt.     Assessment and recommendations are final when note is signed by the attending.

## 2022-02-03 NOTE — CONSULT NOTE ADULT - SUBJECTIVE AND OBJECTIVE BOX
[place]                                                               Office:  [address] [address]                                                              Telephone: [phone]. Fax:[phone]                                                                        CARDIOLOGY CONSULTATION NOTE                                                                                             Consult requested by:  Dr. Cintron  Reason for Consultation: Cardiogenic Shock  Primary Cardiologist: DAVID   History obtained by: Patient and medical record   obtained: No    Covid Status: Unknown    Chief complaint:    Patient is a 81y old  Male who presents with a chief complaint of Cardiogenic shock (2022 19:47)      HPI: 82 y/o M with a PMHx of HTN, macular degeneration, and EtOH abuse who presented to [facility] with complaints of chest pain, dyspnea on exertion, and weakness. Patient states that for "a long time" he has been experiencing dyspnea on exertion and orthopnea, but was "ignoring his symptoms." Patient states starting about 10 days ago the symptoms became much worse with some associated chest pressure with exertion as well, weakness, leg swelling, and decreased PO intake. Patient saw his PMD that showed new onset atrial flutter with RVR, and he was sent to [facility]. Patient underwent a LHC that showed EF 10-15% with mLAD 70% lesion, and had IABP with Norphlet Presley catheter placed. Patient was found to have a cardiac index of 1.8, and was started on milrinone 0.125 mcg. Patient was transferred to [facility] for further management. Patient currently has IABP on 1:1 on milrinone and levophed, lasix 40mg IV BID, and negative 5 L. Patient states his breathing is improving, and he feels better then when he initially presented to [facility]. Patient states he has never seen a Cardiologist before or had any previous workup. Patient denies fevers, chills, syncope, near syncope, abdominal pain, N/V/D, headache, or dizziness.       REVIEW OF SYMPTOMS:     CONSTITUTIONAL: No fever, weight loss, or fatigue  ENMT:  No difficulty hearing, tinnitus, vertigo; No sinus or throat pain  NECK: No pain or stiffness  CARDIOVASCULAR: AS PER HPI  RESPIRATORY: AS PER HPI  : No dysuria, no hematuria   GI: No dark color stool, no melena, no diarrhea, no constipation, no abdominal pain   NEURO: No headache, no dizziness, no slurred speech   MUSCULOSKELETAL: No joint pain or swelling; No muscle, back, or extremity pain  PSYCH: No agitation, no anxiety.    ALL OTHER REVIEW OF SYSTEMS ARE NEGATIVE.      PREVIOUS DIAGNOSTIC TESTING  ECHO FINDINGS: pending      STRESS FINDINGS:      CATHETERIZATION FINDINGS:   C [facility]   mLAD 70%      ALLERGIES: Allergies    No Known Allergies    Intolerances      PAST MEDICAL HISTORY  ETOH abuse    HTN (hypertension)    Macular degeneration        PAST SURGICAL HISTORY      FAMILY HISTORY:  Father- MI  at 57      SOCIAL HISTORY: Lives with his wife, works as a .  CIGARETTES:   Former light smoker in his 20s  ALCOHOL: Active EtOH abuse 5 6oz drinks of liquor daily  DRUGS: Denies      CURRENT MEDICATIONS:  furosemide   Injectable 40 milliGRAM(s) IV Push every 12 hours  milrinone Infusion 0.125 MICROgram(s)/kG/Min IV Continuous <Continuous>  norepinephrine Infusion 0.05 MICROgram(s)/kG/Min IV Continuous <Continuous>     chlorhexidine 2% Cloths  folic acid  heparin  Infusion  multivitamin  thiamine        HOME MEDICATIONS:      Vital Signs Last 24 Hrs  T(C): 36.6 (2022 08:00), Max: 37.1 (2022 23:44)  T(F): 97.9 (2022 08:00), Max: 98.7 (2022 23:44)  HR: 83 (2022 08:00) (59 - 106)  BP: 103/71 (2022 19:05) (103/71 - 103/71)  RR: 20 (2022 08:00) (16 - 26)  SpO2: 89% (2022 08:00) (89% - 97%)      PHYSICAL EXAM:  Constitutional: Comfortable . No acute distress.   HEENT: Atraumatic and normocephalic , neck is supple . no JVD. No carotid bruit. PEERL +swan presley, sclera icteric   CNS: A&Ox3. No focal deficits. EOMI. Cranial nerves II-IX are intact.   Lymph Nodes: Cervical : Not palpable.  Respiratory: Decrease BS at b/l bases  Cardiovascular: S1S2 RRR. No rubs or gallop. II/VI systolic ejection murmur lsb  Gastrointestinal: Soft non-tender and non distended . +Bowel sounds. negative Chiu's sign.  Extremities: 2+ pitting LE edema, warm extremities  Psychiatric: Calm . no agitation.  Skin: No skin rash/ulcers visualized to face, hands or feet.    Intake and output:    @ 07:  -   @ 07:00  --------------------------------------------------------  IN: 606 mL / OUT: 6200 mL / NET: -5594 mL     @ 07:01   @ 09:15  --------------------------------------------------------  IN: 37.4 mL / OUT: 850 mL / NET: -812.6 mL        LABS:                        15.2   9.79  )-----------( 90       ( 2022 05:08 )             43.0         132<L>  |  94<L>  |  54.6<H>  ----------------------------<  81  4.0   |  22.0  |  1.58<H>    Ca    7.7<L>      2022 05:08  Phos  3.6       Mg     1.8         TPro  5.2<L>  /  Alb  3.2<L>  /  TBili  4.7<H>  /  DBili  x   /  AST  5311<H>  /  ALT  2079<H>  /  AlkPhos  117        ;p-BNP=  PT/INR - ( 2022 20:50 )   PT: 34.8 sec;   INR: 3.17 ratio         PTT - ( 2022 20:50 )  PTT:83.6 sec      INTERPRETATION OF TELEMETRY: Reviewed by me. Afib with some slow ventricular response, PVCs, NSVT  ECG: Reviewed by me. Afib, PVCs, PRWP, NSST/T wave abnormalities     RADIOLOGY & ADDITIONAL STUDIES:    X-ray:  reviewed by me.   < from: Xray Chest 1 View- PORTABLE-Urgent (Xray Chest 1 View- PORTABLE-Urgent .) (22 @ 21:44) >  ACC: [id number] EXAM:  XR CHEST PORTABLE URGENT 1V                          PROCEDURE DATE:  2022          INTERPRETATION:  Clinical history: 81-year-old male, line placement.    Portable view of the chest is correlated with the chest CT/radiographs of   2020.    FINDINGS: Right IJ Norphlet-Presley catheter with the tip projected over the   proximal right pulmonary artery, new. Mild cardiomegaly and normal   pulmonary vasculature with no lobar consolidation.    Patchy right perihilar alveolar infiltrates, new. Pleural effusions   evident on CT.    IABP marker projected over the AP window.    IMPRESSION:  Right IJ line Norphlet-Presley catheter with the tip projected over the proximal   pulmonary artery, new.    IABP marker at the AP window.    Right perihilar alveolar infiltrates, new.    Pleural effusions evident on CT    < end of copied text >    CT scan:   MRI:

## 2022-02-03 NOTE — PROGRESS NOTE ADULT - ASSESSMENT
82 y/o M w/ a PMHx of EtOH abuse, HTN, and macular degeneration admitted w/:    1. Cardiogenic shock, bi ventricular failure  2. A fib w/ RVR  3. Nonischemic cardiomyopathy  4. JOON  5. Metabolic acidosis  6. Transaminitis    Neuro:  - Low dose librium taper in SITU, Folic acid/Thiamine  - Avoid Neuro Delirious / sedative medications    CV:  - Will change Milrinone to .25 for more inotropic support, actively titrating levo to maintain augmented pressure of 100, wean as tolerated  - IABP (1-1) in situ, will wean trial 1-3 as per Heart Failure and repeat mixed venous  - Lasix 40 mg IVP BID  - Continue to monitor lactate, LFT's MVO2  - ECHO ordered  - HF following    Pulm:  - continue Decadron for enhanced anti-inflammatory effect  - Low TV lung protective strategies 4-6mL/kg,  -  will utilize PEEP for alveolar recruitment is pt desats  - Actively titrating ventilator settings to maintain SpO2 > 92%,  - Incentive spirometry  - End Tidal CO2 monitoring                  GI:  - DASH diet to optimize nutritional state    Renal:  - Even to net negative fluid balance as tolerated by hemodynamics and renal fx.    - improving Renal Function Continue to monitor Bun/Cr and UOP  - Replacing electrolytes as needed with Goal K> 4, PO> 3, Mg> 2               - Strict I&O's and daily weights  - Avoid Nephro toxic medication    Heme:  - heparin gtt    ID:  - WBC 9.79, remains afebrile with no antipyretics administered   - Microbiology and Radiology reviewed   - trend CBC with diff, CMP and fever curve  - Avoiding antibiotics unless there is a concern for a bacterial/fungal infection    Endo:  - ISS for aggressive glycemic control to limit FS glucose to < 180mg/dl.      Critical Care Time:  50 minutes were spent assessing the patient's presenting problems of acute illness that pose a high probability of life threatening  deterioration or end organ damage / dysfunction.  Medical decision making includes initiation / continuation of plan or care review data/ lab work radiographic study, direct patient care bedside ,  discussions with  consultants regarding care,  evaluation and interpretation of vital signs, any necessary ventilator management,   NIV or BIPAP changes  or initiation,    discussions with multidisciplinary team,  am or pm rounds, discussions of goals of care with patient and family all non-inclusive of procedures.    Plan discussed with Dr Cintron 82 y/o M w/ a PMHx of EtOH abuse, HTN, and macular degeneration admitted w/:    1. Cardiogenic shock, bi ventricular failure  2. A fib w/ RVR  3. Nonischemic cardiomyopathy  4. JOON  5. Metabolic acidosis  6. Transaminitis    Neuro:  - Low dose librium taper in SITU, Folic acid/Thiamine  - Avoid Neuro Delirious / sedative medications    CV:  - Will change Milrinone to .25 for more inotropic support, actively titrating levo to maintain augmented pressure of 100, wean as tolerated  - IABP (1-1) in situ, will wean trial 1-3 as per Heart Failure and repeat mixed venous  - Lasix 40 mg IVP BID  - Continue to monitor lactate, LFT's MVO2  - ECHO ordered  - HF following    Pulm:  - Supplemental O2 as need to maintain SPO2 >92%  - Incentive Spirometry                  GI:  - DASH diet to optimize nutritional state    Renal:  - Even to net negative fluid balance as tolerated by hemodynamics and renal fx.    - improving Renal Function Continue to monitor Bun/Cr and UOP  - Replacing electrolytes as needed with Goal K> 4, PO> 3, Mg> 2               - Strict I&O's and daily weights  - Avoid Nephro toxic medication    Heme:  - heparin gtt    ID:  - WBC 9.79, remains afebrile with no antipyretics administered   - Microbiology and Radiology reviewed   - trend CBC with diff, CMP and fever curve  - Avoiding antibiotics unless there is a concern for a bacterial/fungal infection    Endo:  - ISS for aggressive glycemic control to limit FS glucose to < 180mg/dl.      Critical Care Time:  50 minutes were spent assessing the patient's presenting problems of acute illness that pose a high probability of life threatening  deterioration or end organ damage / dysfunction.  Medical decision making includes initiation / continuation of plan or care review data/ lab work radiographic study, direct patient care bedside ,  discussions with  consultants regarding care,  evaluation and interpretation of vital signs, any necessary ventilator management,   NIV or BIPAP changes  or initiation,    discussions with multidisciplinary team,  am or pm rounds, discussions of goals of care with patient and family all non-inclusive of procedures.    Plan discussed with Dr Cintron

## 2022-02-03 NOTE — CONSULT NOTE ADULT - PROBLEM SELECTOR RECOMMENDATION 9
Mixed ischemic and non-ischemic cardiomyopathy   LVEF 10-15%, BiV involvement   Clinically euvolemic, warm and well perfused  IABP currently @ 1:1 - plan to wean - trial 1:3 and repeat mixed venous O2 sat.  Inotropes: Continue Milrinone @ 0.125mcg/kg/min  Pressors: Levophed 0.04mcg/kg/min, wean as able (titrate to augmented pressure of 100)  Diuretics: Lasix 40mg IVP BID  Monitor markers of perfusion daily including lactate, LFTs, MVO2  Hemodynamic goals: CVP <10, MVO2 65-70%, augmented , CI 2.2

## 2022-02-03 NOTE — CONSULT NOTE ADULT - SUBJECTIVE AND OBJECTIVE BOX
[facility] Advanced Heart Failure  Office: [place] [address]  Telephone: [phone]/Fax: [phone]    HPI:  80 y/o M w/ a PMHx of EtOH abuse, HTN, and macular degeneration who presented to [facility] ER on 2/1 c/o weakness, dyspnea on exertion, intermittent chest pain, and decreased PO intake. Pt was instructed to go to the ER after outpatient EKG revealed a flutter w/ RVR. Underwent cardiac catheterization which was revealing of EF 10-15% and 70% mid LAD occlusion. IABP placed. [place] presley catheter inserted. Cardiac index 1.8. Milrinone infusion initiated at 0.125mcg. Pt was transferred to [facility] for continuation of care.  (02 Feb 2022 19:47)    PAST MEDICAL & SURGICAL HISTORY:  ETOH abuse    HTN (hypertension)    Macular degeneration      REVIEW OF SYSTEMS:  14 point ROS negative in detail apart from as documented in HPI.    MEDICATIONS  (STANDING):  chlorhexidine 2% Cloths 1 Application(s) Topical daily  folic acid 1 milliGRAM(s) Oral daily  furosemide   Injectable 40 milliGRAM(s) IV Push every 12 hours  heparin  Infusion 1000 Unit(s)/Hr (10 mL/Hr) IV Continuous <Continuous>  milrinone Infusion 0.125 MICROgram(s)/kG/Min (2.81 mL/Hr) IV Continuous <Continuous>  multivitamin 1 Tablet(s) Oral daily  norepinephrine Infusion 0.05 MICROgram(s)/kG/Min (7.21 mL/Hr) IV Continuous <Continuous>  thiamine 100 milliGRAM(s) Oral daily    Home Medications:  Norvasc 5 mg oral tablet: 1 tab(s) orally once a day  LAST FILLED 4/2021 FOR 90 DAY SUPPLY (03 Feb 2022 08:36)    Allergies: No Known Allergies    Social History:  Works as a . Hx of EtOH abuse (5-6 drinks/day). Denies hx of tobacco and illicit drug abuse. (02 Feb 2022 19:47)    FAMILY HISTORY:      ICU Vital Signs Last 24 Hrs  T(C): 36.6, Max: 37.1 (02-02 @ 23:44)  HR: 109 (59 - 109)  BP: 103/71 (103/71 - 103/71)  BP(mean): --  ABP: 95/61 (87/50 - 111/69)  ABP(mean): 77 (66 - 85)  RR: 18 (16 - 26)  SpO2: 97% (89% - 97%)    I&O's Summary Last 24 Hrs    IN: 606 mL / OUT: 6200 mL / NET: -5594 mL    Tele:    Physical Exam:  General: Sleeping comfortably  HEENT: EOM intact.  Neck: JVP difficult to assess. Right IJ PA catheter  Chest: Clear to auscultation bilaterally  CV: Irregularly irregular. Normal S1 and S2. No murmurs, rub, or gallops.   PV: No edema, warm peripherally, 2+ radial pulses b/l, 2+ DP pulses b/l  Abdomen: Soft, non-distended  Skin: warm, dry    Labs:    ( 02-03-22 @ 05:08 )               15.2   9.79  )--------( 90                   43.0     ( 02-03-22 @ 05:08 )     132  |  94  |  54.6  ---------------------<  81  4.0  |  22.0  |  1.58    Ca 7.7  Phos 3.6  Mg 1.8    ( 02-03-22 @ 05:08 )  TPro  5.2  /  Alb  3.2  /  TBili  4.7  /  DBili  x   /  AST  5311  /  ALT  2079  /  AlkPhos  117    ( 02-02-22 @ 20:50 )  TPro  5.6  /  Alb  3.5  /  TBili  5.0  /  DBili  x   /  AST  >7000  /  ALT  2445  /  AlkPhos  126    PTT/PT/INR - ( 02-02-22 @ 20:50 )  PTT: 83.6 sec / PT: 34.8 sec / INR: 3.17 ratio    Serum Pro-Brain Natriuretic Peptide: 2970 (02-03-22 @ 10:03)    VBG - ( 02-03-22 @ 06:31 )  pH: 7.430 / pCO2: 43    / pO2: <42   / Oxygen saturation: 70.4      VBG - ( 02-02-22 @ 22:33 )  pH: 7.400 / pCO2: 40    / pO2: <42   / Oxygen saturation: 68.6      Blood Gas Venous - Lactate: 1.40 (02-03-22 @ 06:31)  Lactate, Blood: 1.5 (02-03-22 @ 05:08)    Xray Chest 1 View- PORTABLE-Urgent (02.02.22 @ 21:44)   IMPRESSION:  Right IJ line [place] catheter with the tip projected over the proximal   pulmonary artery, new.    IABP marker at the AP window.    Right perihilar alveolar infiltrates, new.    Pleural effusions evident on CT    < end of copied text >     [facility] Advanced Heart Failure  Office: [place] [address]  Telephone: [phone]/Fax: [phone]    HPI:  80 y/o M w/ a PMHx of EtOH abuse, HTN, and macular degeneration who presented to [facility] ER on 2/1 c/o weakness, dyspnea on exertion, intermittent chest pain, and decreased PO intake. Pt was instructed to go to the ER after outpatient EKG revealed a flutter w/ RVR. Underwent cardiac catheterization which was revealing of EF 10-15% and 70% mid LAD occlusion. IABP placed. [place] presley catheter inserted. Cardiac index 1.8. Milrinone infusion initiated at 0.125mcg. Pt was transferred to [facility] for continuation of care.  (02 Feb 2022 19:47)    Above appreciated.    PAST MEDICAL & SURGICAL HISTORY:  ETOH abuse    HTN (hypertension)    Macular degeneration      REVIEW OF SYSTEMS:  14 point ROS negative in detail apart from as documented in HPI.    MEDICATIONS  (STANDING):  chlorhexidine 2% Cloths 1 Application(s) Topical daily  folic acid 1 milliGRAM(s) Oral daily  furosemide   Injectable 40 milliGRAM(s) IV Push every 12 hours  heparin  Infusion 1000 Unit(s)/Hr (10 mL/Hr) IV Continuous <Continuous>  milrinone Infusion 0.125 MICROgram(s)/kG/Min (2.81 mL/Hr) IV Continuous <Continuous>  multivitamin 1 Tablet(s) Oral daily  norepinephrine Infusion 0.05 MICROgram(s)/kG/Min (7.21 mL/Hr) IV Continuous <Continuous>  thiamine 100 milliGRAM(s) Oral daily    Home Medications:  Norvasc 5 mg oral tablet: 1 tab(s) orally once a day  LAST FILLED 4/2021 FOR 90 DAY SUPPLY (03 Feb 2022 08:36)    Allergies: No Known Allergies    Social History:  Works as a . Hx of EtOH abuse (5-6 drinks/day). Denies hx of tobacco and illicit drug abuse. (02 Feb 2022 19:47)    FAMILY HISTORY:      ICU Vital Signs Last 24 Hrs  T(C): 36.6, Max: 37.1 (02-02 @ 23:44)  HR: 109 (59 - 109)  BP: 103/71 (103/71 - 103/71)  BP(mean): --  ABP: 95/61 (87/50 - 111/69)  ABP(mean): 77 (66 - 85)  RR: 18 (16 - 26)  SpO2: 97% (89% - 97%)    I&O's Summary Last 24 Hrs    IN: 606 mL / OUT: 6200 mL / NET: -5594 mL    Tele: atrial fibrillation, frequent PVCs, 6 beats NSVT    Physical Exam:  General: Sleeping comfortably  HEENT: EOM intact.  Neck: JVP difficult to assess. Right IJ PA catheter  Chest: Clear to auscultation bilaterally  CV: Irregularly irregular. Normal S1 and S2. No murmurs, rub, or gallops.   PV: No edema, warm peripherally, 2+ radial pulses b/l, 2+ DP pulses b/l  Abdomen: Soft, non-distended  Skin: warm, dry    Labs:    ( 02-03-22 @ 05:08 )               15.2   9.79  )--------( 90                   43.0     ( 02-03-22 @ 05:08 )     132  |  94  |  54.6  ---------------------<  81  4.0  |  22.0  |  1.58    Ca 7.7  Phos 3.6  Mg 1.8    ( 02-03-22 @ 05:08 )  TPro  5.2  /  Alb  3.2  /  TBili  4.7  /  DBili  x   /  AST  5311  /  ALT  2079  /  AlkPhos  117    ( 02-02-22 @ 20:50 )  TPro  5.6  /  Alb  3.5  /  TBili  5.0  /  DBili  x   /  AST  >7000  /  ALT  2445  /  AlkPhos  126    PTT/PT/INR - ( 02-02-22 @ 20:50 )  PTT: 83.6 sec / PT: 34.8 sec / INR: 3.17 ratio    Serum Pro-Brain Natriuretic Peptide: 2970 (02-03-22 @ 10:03)    VBG - ( 02-03-22 @ 06:31 )  pH: 7.430 / pCO2: 43    / pO2: <42   / Oxygen saturation: 70.4      VBG - ( 02-02-22 @ 22:33 )  pH: 7.400 / pCO2: 40    / pO2: <42   / Oxygen saturation: 68.6      Blood Gas Venous - Lactate: 1.40 (02-03-22 @ 06:31)  Lactate, Blood: 1.5 (02-03-22 @ 05:08)    Xray Chest 1 View- PORTABLE-Urgent (02.02.22 @ 21:44)   IMPRESSION:  Right IJ line [place]-Pres[place] catheter with the tip projected over the proximal   pulmonary artery, new.    IABP marker at the AP window.    Right perihilar alveolar infiltrates, new.    Pleural effusions evident on CT    < end of copied text >

## 2022-02-03 NOTE — SBIRT NOTE ADULT - NSSBIRTBRIEFINTDET_GEN_A_CORE
pt reports doctors spoke with him regarding his drinking is agreeable to cut down and stop no agreeable to services

## 2022-02-03 NOTE — CONSULT NOTE ADULT - SUBJECTIVE AND OBJECTIVE BOX
Very pleasant 81 year old male patient with a history of HTN, macular degeneration, and daily ETOH consumption who presented to [facility] ER on  c/o weakness, dyspnea on exertion, intermittent chest pain, and decreased PO intake. Patient reports that for the past month he has not been feeling himself, finds himself more tired, and has lost his appetite. He also noticed an inability to lie flat while sleeping at night and has had to increase the amount of pillows he uses. He presented to his PMD who performed an EKG and found rapid atrial flutter. Referred to [facility] ER. Underwent cardiac catheterization which was revealing of EF 10-15% and 70% mid LAD occlusion. IABP placed. [place] presley catheter inserted. Cardiac index 1.8. Milrinone infusion initiated at 0.125mcg.    PAST MEDICAL & SURGICAL HISTORY:  ETOH abuse  HTN (hypertension)  Macular degeneration    REVIEW OF SYSTEMS  General: - fever or chills, + fatigue  Skin/Breast: - rashes  Ophthalmologic: - blurred vision	  ENMT: - sore throat  Respiratory and Thorax:	+dyspnea, - cough  Cardiovascular: + ORELLANA, + chest pain, - palpitations  Gastrointestinal:	+decreased appetitive. No change in bowel habits  Genitourinary: - dysuria  Musculoskeletal:	 - arthritis  Neurological: - weaknesses  Psychiatric: - anxiety    MEDICATIONS  (STANDING):  chlorhexidine 2% Cloths 1 Application(s) Topical daily  folic acid 1 milliGRAM(s) Oral daily  furosemide   Injectable 40 milliGRAM(s) IV Push every 12 hours  heparin  Infusion 1000 Unit(s)/Hr (10 mL/Hr) IV Continuous <Continuous>  milrinone Infusion 0.125 MICROgram(s)/kG/Min (2.81 mL/Hr) IV Continuous <Continuous>  multivitamin 1 Tablet(s) Oral daily  norepinephrine Infusion 0.05 MICROgram(s)/kG/Min (7.21 mL/Hr) IV Continuous <Continuous>  thiamine 100 milliGRAM(s) Oral daily    Allergies  No Known Allergies    SOCIAL HISTORY: former smoker, current daily ETOH (liquor) 6 drinks "for many years"  Works part time now -  at low    FAMILY HISTORY: Father  at age 57 MI.     Vital Signs Last 24 Hrs  T(C): 36.6 (2022 08:00), Max: 37.1 (2022 23:44)  T(F): 97.9 (2022 08:00), Max: 98.7 (2022 23:44)  HR: 109 (2022 10:00) (59 - 109)  BP: 103/71 (2022 19:05) (103/71 - 103/71)  RR: 18 (2022 10:00) (16 - 26)  SpO2: 97% (2022 10:00) (89% - 97%)    Physical Exam:  Constitutional: AAOx3, NAD, appears of stated age.  Neck: supple, No JVD  Cardiovascular: +S1S2 IR. SR with frequent APCs and PVCs.   Pulmonary: Anterior breath sounds. Coarse but unlabored  Abdomen: +BS, soft NTND  Extremities: no edema b/l,   Neuro: non focal, speech clear, ACKERMAN x 4  Psych: appropriate mood and affect.     LABS:                        15.2   9.79  )-----------( 90       ( 2022 05:08 )             43.0     132<L>  |  94<L>  |  54.6<H>  ----------------------------<  82  4.0   |  22.0  |  1.58<H>    Ca    7.7<L>      2022 05:08  Phos  3.6     02-03  Mg     1.8     02-03  TPro  5.2<L>  /  Alb  3.2<L>  /  TBili  4.7<H>  /  DBili  x   /  AST  5311<H>  /  ALT  2079<H>  /  AlkPhos  117  02-03  LIVER FUNCTIONS - ( 2022 05:08 )  Alb: 3.2 g/dL / Pro: 5.2 g/dL / ALK PHOS: 117 U/L / ALT: 2079 U/L / AST: 5311 U/L / GGT: x         PT/INR - ( 2022 20:50 )   PT: 34.8 sec;   INR: 3.17 ratio    PTT - ( 2022 20:50 )  PTT:83.6 sec    RADIOLOGY & ADDITIONAL STUDIES:  CXR 22  IMPRESSION:  Right IJ line [place]Presley catheter with the tip projected over the proximal   pulmonary artery, new.  IABP marker at the AP window.  Right perihilar alveolar infiltrates, new.  Pleural effusions evident on CT    EKG: SR at 74bpm    A/P  81 year old male patient with a history of HTN, macular degeneration, and daily ETOH consumption who is admitted with acute systolic heart failure (non-ischemic), EF 10-15%, and new onset atrial flutter/fibrillation. CHADSVASC: 5    Currently the patient is in SR with frequent atrial and ventricular premature beats  Telemetry evidence in chart of atrial fibrillation and typical counterclockwise atrial flutter    - GDMT for heart failure per Cardiology and Heart Failure  - Currently in SR. Would ideally use Amiodarone for rhythm control however due to transaminitis will need to hold off  - Start beta blockers when clinically feasible  - Continue anticoagulation with Heparin. Would transition to DOAC upon discharge  - Does not qualify for primary prevention ICD at this time.  - Should follow with EP as outpatient to address rhythm control and potential future ICD therapy if heart failure does not improve.   - Full recommendations to follow.      Very pleasant 81 year old male patient with a history of HTN, macular degeneration, and daily ETOH consumption who presented to [facility] ER on  c/o weakness, dyspnea on exertion, intermittent chest pain, and decreased PO intake. Patient reports that for the past month he has not been feeling himself, finds himself more tired, and has lost his appetite. He also noticed an inability to lie flat while sleeping at night and has had to increase the amount of pillows he uses. He presented to his PMD who performed an EKG and found rapid atrial flutter. Referred to [facility] ER. Underwent cardiac catheterization which was revealing of EF 10-15% and 70% mid LAD occlusion. IABP placed. [place] presley catheter inserted. Cardiac index 1.8. Milrinone infusion initiated at 0.125mcg.    PAST MEDICAL & SURGICAL HISTORY:  ETOH abuse  HTN (hypertension)  Macular degeneration    REVIEW OF SYSTEMS  General: - fever or chills, + fatigue  Skin/Breast: - rashes  Ophthalmologic: - blurred vision	  ENMT: - sore throat  Respiratory and Thorax:	+dyspnea, - cough  Cardiovascular: + ORELLANA, + chest pain, - palpitations  Gastrointestinal:	+decreased appetitive. No change in bowel habits  Genitourinary: - dysuria  Musculoskeletal:	 - arthritis  Neurological: - weaknesses  Psychiatric: - anxiety    MEDICATIONS  (STANDING):  chlorhexidine 2% Cloths 1 Application(s) Topical daily  folic acid 1 milliGRAM(s) Oral daily  furosemide   Injectable 40 milliGRAM(s) IV Push every 12 hours  heparin  Infusion 1000 Unit(s)/Hr (10 mL/Hr) IV Continuous <Continuous>  milrinone Infusion 0.125 MICROgram(s)/kG/Min (2.81 mL/Hr) IV Continuous <Continuous>  multivitamin 1 Tablet(s) Oral daily  norepinephrine Infusion 0.05 MICROgram(s)/kG/Min (7.21 mL/Hr) IV Continuous <Continuous>  thiamine 100 milliGRAM(s) Oral daily    Allergies  No Known Allergies    SOCIAL HISTORY: former smoker, current daily ETOH (liquor) 6 drinks "for many years"  Works part time now -  at low    FAMILY HISTORY: Father  at age 57 MI.     Vital Signs Last 24 Hrs  T(C): 36.6 (2022 08:00), Max: 37.1 (2022 23:44)  T(F): 97.9 (2022 08:00), Max: 98.7 (2022 23:44)  HR: 109 (2022 10:00) (59 - 109)  BP: 103/71 (2022 19:05) (103/71 - 103/71)  RR: 18 (2022 10:00) (16 - 26)  SpO2: 97% (2022 10:00) (89% - 97%)    Physical Exam:  Constitutional: AAOx3, NAD, appears of stated age.  Neck: supple, No JVD  Cardiovascular: +S1S2 IR. SR with frequent APCs and PVCs.   Pulmonary: Anterior breath sounds. Coarse but unlabored  Abdomen: +BS, soft NTND  Extremities: no edema b/l,   Neuro: non focal, speech clear, ACKERMAN x 4  Psych: appropriate mood and affect.     LABS:                        15.2   9.79  )-----------( 90       ( 2022 05:08 )             43.0     132<L>  |  94<L>  |  54.6<H>  ----------------------------<  82  4.0   |  22.0  |  1.58<H>    Ca    7.7<L>      2022 05:08  Phos  3.6     02-03  Mg     1.8     02-03  TPro  5.2<L>  /  Alb  3.2<L>  /  TBili  4.7<H>  /  DBili  x   /  AST  5311<H>  /  ALT  2079<H>  /  AlkPhos  117  02-03  LIVER FUNCTIONS - ( 2022 05:08 )  Alb: 3.2 g/dL / Pro: 5.2 g/dL / ALK PHOS: 117 U/L / ALT: 2079 U/L / AST: 5311 U/L / GGT: x         PT/INR - ( 2022 20:50 )   PT: 34.8 sec;   INR: 3.17 ratio    PTT - ( 2022 20:50 )  PTT:83.6 sec    RADIOLOGY & ADDITIONAL STUDIES:  CXR 22  IMPRESSION:  Right IJ line [place]Pres[place] catheter with the tip projected over the proximal   pulmonary artery, new.  IABP marker at the AP window.  Right perihilar alveolar infiltrates, new.  Pleural effusions evident on CT    EKG: SR at 74bpm    A/P  81 year old male patient with a history of HTN, macular degeneration, and daily ETOH consumption who is admitted with acute systolic heart failure (non-ischemic), EF 10-15%, and new onset atrial flutter/fibrillation. CHADSVASC: 5    Currently the patient is in SR with frequent atrial and ventricular premature beats  Telemetry evidence in chart of atrial fibrillation and typical counterclockwise atrial flutter and atrial tachycardia with variable conduction. Again very frequent atrial and ventricular ectopy is noted.     - GDMT for heart failure per Cardiology and Heart Failure  - Currently in SR. Would ideally use Amiodarone for rhythm control however due to transaminitis will need to hold off  - Start beta blockers when clinically feasible  - Continue anticoagulation with Heparin. Would transition to DOAC upon discharge  - Does not qualify for primary prevention ICD at this time. May offer/discuss LifeVest upon discharge if patient will be compliant.   - Should follow with EP as outpatient to address rhythm control and potential future ICD therapy if heart failure does not improve.   - Full recommendations to follow.

## 2022-02-04 LAB
ALBUMIN SERPL ELPH-MCNC: 3.3 G/DL — SIGNIFICANT CHANGE UP (ref 3.3–5.2)
ALP SERPL-CCNC: 124 U/L — HIGH (ref 40–120)
ALT FLD-CCNC: 1548 U/L — HIGH
ANION GAP SERPL CALC-SCNC: 14 MMOL/L — SIGNIFICANT CHANGE UP (ref 5–17)
APTT BLD: 39.6 SEC — HIGH (ref 27.5–35.5)
AST SERPL-CCNC: 1932 U/L — HIGH
BASE EXCESS BLDMV CALC-SCNC: 9.1 MMOL/L — HIGH (ref -3–3)
BASE EXCESS BLDV CALC-SCNC: 11.1 MMOL/L — HIGH (ref -2–3)
BASOPHILS # BLD AUTO: 0.02 K/UL — SIGNIFICANT CHANGE UP (ref 0–0.2)
BASOPHILS NFR BLD AUTO: 0.3 % — SIGNIFICANT CHANGE UP (ref 0–2)
BILIRUB SERPL-MCNC: 5.5 MG/DL — HIGH (ref 0.4–2)
BLOOD GAS COMMENTS, VENOUS: SIGNIFICANT CHANGE UP
BLOOD GAS COMMENTS, VENOUS: SIGNIFICANT CHANGE UP
BUN SERPL-MCNC: 29.9 MG/DL — HIGH (ref 8–20)
CA-I SERPL-SCNC: 0.95 MMOL/L — LOW (ref 1.15–1.33)
CA-I SERPL-SCNC: 1.04 MMOL/L — LOW (ref 1.15–1.33)
CALCIUM SERPL-MCNC: 8 MG/DL — LOW (ref 8.6–10.2)
CHLORIDE BLDV-SCNC: 92 MMOL/L — LOW (ref 98–107)
CHLORIDE BLDV-SCNC: 92 MMOL/L — LOW (ref 98–107)
CHLORIDE SERPL-SCNC: 93 MMOL/L — LOW (ref 98–107)
CO2 SERPL-SCNC: 29 MMOL/L — SIGNIFICANT CHANGE UP (ref 22–29)
COHGB MFR BLDMV: 2 % — SIGNIFICANT CHANGE UP
CREAT SERPL-MCNC: 0.72 MG/DL — SIGNIFICANT CHANGE UP (ref 0.5–1.3)
EOSINOPHIL # BLD AUTO: 0.02 K/UL — SIGNIFICANT CHANGE UP (ref 0–0.5)
EOSINOPHIL NFR BLD AUTO: 0.3 % — SIGNIFICANT CHANGE UP (ref 0–6)
GAS PNL BLDV: 129 MMOL/L — LOW (ref 136–145)
GAS PNL BLDV: 130 MMOL/L — LOW (ref 136–145)
GAS PNL BLDV: SIGNIFICANT CHANGE UP
GAS PNL BLDV: SIGNIFICANT CHANGE UP
GLUCOSE BLDV-MCNC: 100 MG/DL — HIGH (ref 70–99)
GLUCOSE BLDV-MCNC: 254 MG/DL — HIGH (ref 70–99)
GLUCOSE SERPL-MCNC: 99 MG/DL — SIGNIFICANT CHANGE UP (ref 70–99)
HCO3 BLDMV-SCNC: 33 MMOL/L — HIGH (ref 20–28)
HCO3 BLDV-SCNC: 33 MMOL/L — HIGH (ref 22–29)
HCO3 BLDV-SCNC: 35 MMOL/L — HIGH (ref 22–29)
HCT VFR BLD CALC: 45.1 % — SIGNIFICANT CHANGE UP (ref 39–50)
HCT VFR BLDA CALC: 45 % — SIGNIFICANT CHANGE UP (ref 39–51)
HCT VFR BLDA CALC: 49 % — SIGNIFICANT CHANGE UP (ref 39–51)
HGB BLD CALC-MCNC: 15.1 G/DL — SIGNIFICANT CHANGE UP (ref 12.6–17.4)
HGB BLD CALC-MCNC: 16.4 G/DL — SIGNIFICANT CHANGE UP (ref 12.6–17.4)
HGB BLD-MCNC: 16 G/DL — SIGNIFICANT CHANGE UP (ref 13–17)
HGB FLD-MCNC: 16.2 G/DL — SIGNIFICANT CHANGE UP (ref 12.6–17.4)
HOROWITZ INDEX BLDMV+IHG-RTO: SIGNIFICANT CHANGE UP
HOROWITZ INDEX BLDV+IHG-RTO: 0.21 — SIGNIFICANT CHANGE UP
HOROWITZ INDEX BLDV+IHG-RTO: SIGNIFICANT CHANGE UP
IMM GRANULOCYTES NFR BLD AUTO: 0.3 % — SIGNIFICANT CHANGE UP (ref 0–1.5)
INR BLD: 1.97 RATIO — HIGH (ref 0.88–1.16)
INR BLD: 2.2 RATIO — HIGH (ref 0.88–1.16)
LACTATE BLDV-MCNC: 1.1 MMOL/L — SIGNIFICANT CHANGE UP (ref 0.5–2)
LACTATE BLDV-MCNC: 1.2 MMOL/L — SIGNIFICANT CHANGE UP (ref 0.5–2)
LACTATE SERPL-SCNC: 1.3 MMOL/L — SIGNIFICANT CHANGE UP (ref 0.5–2)
LYMPHOCYTES # BLD AUTO: 0.55 K/UL — LOW (ref 1–3.3)
LYMPHOCYTES # BLD AUTO: 8.4 % — LOW (ref 13–44)
MAGNESIUM SERPL-MCNC: 1.6 MG/DL — SIGNIFICANT CHANGE UP (ref 1.6–2.6)
MCHC RBC-ENTMCNC: 31.9 PG — SIGNIFICANT CHANGE UP (ref 27–34)
MCHC RBC-ENTMCNC: 35.5 GM/DL — SIGNIFICANT CHANGE UP (ref 32–36)
MCV RBC AUTO: 89.8 FL — SIGNIFICANT CHANGE UP (ref 80–100)
METHGB MFR BLDMV: 0.7 % — SIGNIFICANT CHANGE UP
MONOCYTES # BLD AUTO: 0.49 K/UL — SIGNIFICANT CHANGE UP (ref 0–0.9)
MONOCYTES NFR BLD AUTO: 7.5 % — SIGNIFICANT CHANGE UP (ref 2–14)
NEUTROPHILS # BLD AUTO: 5.47 K/UL — SIGNIFICANT CHANGE UP (ref 1.8–7.4)
NEUTROPHILS NFR BLD AUTO: 83.2 % — HIGH (ref 43–77)
NT-PROBNP SERPL-SCNC: 1356 PG/ML — HIGH (ref 0–300)
O2 CT VFR BLD CALC: <42 MMHG — SIGNIFICANT CHANGE UP (ref 30–65)
PCO2 BLDMV: 45 MMHG — SIGNIFICANT CHANGE UP (ref 30–65)
PCO2 BLDV: 48 MMHG — SIGNIFICANT CHANGE UP (ref 42–55)
PCO2 BLDV: 52 MMHG — SIGNIFICANT CHANGE UP (ref 42–55)
PH BLDMV: 7.47 — HIGH (ref 7.32–7.45)
PH BLDV: 7.44 — HIGH (ref 7.32–7.43)
PH BLDV: 7.44 — HIGH (ref 7.32–7.43)
PHOSPHATE SERPL-MCNC: 2.7 MG/DL — SIGNIFICANT CHANGE UP (ref 2.4–4.7)
PLATELET # BLD AUTO: 68 K/UL — LOW (ref 150–400)
PO2 BLDV: 43 MMHG — SIGNIFICANT CHANGE UP (ref 25–45)
PO2 BLDV: <42 MMHG — SIGNIFICANT CHANGE UP (ref 25–45)
POTASSIUM BLDV-SCNC: 3.1 MMOL/L — LOW (ref 3.5–5.1)
POTASSIUM BLDV-SCNC: 4 MMOL/L — SIGNIFICANT CHANGE UP (ref 3.5–5.1)
POTASSIUM SERPL-MCNC: 3.5 MMOL/L — SIGNIFICANT CHANGE UP (ref 3.5–5.3)
POTASSIUM SERPL-SCNC: 3.5 MMOL/L — SIGNIFICANT CHANGE UP (ref 3.5–5.3)
PROT SERPL-MCNC: 5.4 G/DL — LOW (ref 6.6–8.7)
PROTHROM AB SERPL-ACNC: 22.1 SEC — HIGH (ref 10.6–13.6)
PROTHROM AB SERPL-ACNC: 24.6 SEC — HIGH (ref 10.6–13.6)
RBC # BLD: 5.02 M/UL — SIGNIFICANT CHANGE UP (ref 4.2–5.8)
RBC # FLD: 12.1 % — SIGNIFICANT CHANGE UP (ref 10.3–14.5)
SAO2 % BLDV: 71 % — SIGNIFICANT CHANGE UP
SAO2 % BLDV: 74.7 % — SIGNIFICANT CHANGE UP
SODIUM SERPL-SCNC: 136 MMOL/L — SIGNIFICANT CHANGE UP (ref 135–145)
TROPONIN T SERPL-MCNC: 0.26 NG/ML — HIGH (ref 0–0.06)
WBC # BLD: 6.57 K/UL — SIGNIFICANT CHANGE UP (ref 3.8–10.5)
WBC # FLD AUTO: 6.57 K/UL — SIGNIFICANT CHANGE UP (ref 3.8–10.5)

## 2022-02-04 PROCEDURE — 99233 SBSQ HOSP IP/OBS HIGH 50: CPT

## 2022-02-04 PROCEDURE — 93010 ELECTROCARDIOGRAM REPORT: CPT

## 2022-02-04 RX ORDER — DOFETILIDE 0.25 MG/1
500 CAPSULE ORAL EVERY 12 HOURS
Refills: 0 | Status: DISCONTINUED | OUTPATIENT
Start: 2022-02-04 | End: 2022-02-05

## 2022-02-04 RX ORDER — POTASSIUM CHLORIDE 20 MEQ
40 PACKET (EA) ORAL ONCE
Refills: 0 | Status: COMPLETED | OUTPATIENT
Start: 2022-02-04 | End: 2022-02-04

## 2022-02-04 RX ORDER — HEPARIN SODIUM 5000 [USP'U]/ML
1000 INJECTION INTRAVENOUS; SUBCUTANEOUS
Qty: 25000 | Refills: 0 | Status: DISCONTINUED | OUTPATIENT
Start: 2022-02-04 | End: 2022-02-06

## 2022-02-04 RX ORDER — POTASSIUM PHOSPHATE, MONOBASIC POTASSIUM PHOSPHATE, DIBASIC 236; 224 MG/ML; MG/ML
15 INJECTION, SOLUTION INTRAVENOUS ONCE
Refills: 0 | Status: COMPLETED | OUTPATIENT
Start: 2022-02-04 | End: 2022-02-04

## 2022-02-04 RX ORDER — MAGNESIUM SULFATE 500 MG/ML
1 VIAL (ML) INJECTION ONCE
Refills: 0 | Status: COMPLETED | OUTPATIENT
Start: 2022-02-04 | End: 2022-02-04

## 2022-02-04 RX ADMIN — Medication 7.21 MICROGRAM(S)/KG/MIN: at 12:58

## 2022-02-04 RX ADMIN — DOFETILIDE 500 MICROGRAM(S): 0.25 CAPSULE ORAL at 22:48

## 2022-02-04 RX ADMIN — Medication 100 GRAM(S): at 08:09

## 2022-02-04 RX ADMIN — MILRINONE LACTATE 2.81 MICROGRAM(S)/KG/MIN: 1 INJECTION, SOLUTION INTRAVENOUS at 12:58

## 2022-02-04 RX ADMIN — Medication 1 MILLIGRAM(S): at 12:57

## 2022-02-04 RX ADMIN — POTASSIUM PHOSPHATE, MONOBASIC POTASSIUM PHOSPHATE, DIBASIC 62.5 MILLIMOLE(S): 236; 224 INJECTION, SOLUTION INTRAVENOUS at 08:09

## 2022-02-04 RX ADMIN — Medication 40 MILLIEQUIVALENT(S): at 08:09

## 2022-02-04 RX ADMIN — Medication 25 MILLIGRAM(S): at 17:37

## 2022-02-04 RX ADMIN — Medication 81 MILLIGRAM(S): at 12:57

## 2022-02-04 RX ADMIN — Medication 1 TABLET(S): at 12:57

## 2022-02-04 RX ADMIN — HEPARIN SODIUM 1000 UNIT(S)/HR: 5000 INJECTION INTRAVENOUS; SUBCUTANEOUS at 18:07

## 2022-02-04 RX ADMIN — Medication 100 MILLIGRAM(S): at 12:57

## 2022-02-04 RX ADMIN — Medication 25 MILLIGRAM(S): at 06:18

## 2022-02-04 RX ADMIN — Medication 40 MILLIGRAM(S): at 06:18

## 2022-02-04 RX ADMIN — CHLORHEXIDINE GLUCONATE 1 APPLICATION(S): 213 SOLUTION TOPICAL at 13:04

## 2022-02-04 NOTE — DIETITIAN INITIAL EVALUATION ADULT. - ADD RECOMMEND
Continue MVI, thiamine, and folic acid supplementation. Encourage po intake, monitor diet tolerance, and provide assistance at meals as needed. Obtain daily weights to monitor trends.

## 2022-02-04 NOTE — PROGRESS NOTE ADULT - SUBJECTIVE AND OBJECTIVE BOX
[place] [facility]                                                               Office: [address] [address]                                                              Telephone: [phone]. Fax:[phone]                                                                             PROGRESS NOTE  Reason for follow up: Cardiogenic shock  Update: Patient tolerated IABP 1:3 yesterday, but increased to 1:1 overnight. Patient still on milrinone and levophed   Covid Status:     Review of symptoms:   Cardiac:  No chest pain. No dyspnea. No palpitations.  Respiratory: No cough. No dyspnea  Gastrointestinal: No diarrhea. No abdominal pain. No bleeding.     Past medical history: No updates.   	  Vitals:  T(C): 36.6 (02-04-22 @ 11:30), Max: 37 (02-04-22 @ 00:00)  HR: 112 (02-04-22 @ 11:00) (75 - 124)  BP: --  RR: 20 (02-04-22 @ 11:00) (15 - 26)  SpO2: 98% (02-04-22 @ 11:00) (90% - 99%)  Wt(kg): --  I&O's Summary    03 Feb 2022 07:01  -  04 Feb 2022 07:00  --------------------------------------------------------  IN: 528.2 mL / OUT: 5420 mL / NET: -4891.8 mL    04 Feb 2022 07:01  -  04 Feb 2022 11:49  --------------------------------------------------------  IN: 80.4 mL / OUT: 676 mL / NET: -595.6 mL      Weight (kg): 76.9 (02-02 @ 19:05)      PHYSICAL EXAM:  Appearance: Comfortable. No acute distress  HEENT:  Head and neck: Atraumatic. Normocephalic.  Normal oral mucosa, PERRL, Neck is supple. No JVD, No carotid bruit.   Neurologic: A&Ox 3, no focal deficits. EOMI, Cranial nerves are intact.  Lymphatic: No cervical lymphadenopathy  Cardiovascular: Normal S1 S2, No murmur, rubs/gallops. No JVD, No edema  Respiratory: Lungs clear to auscultation  Gastrointestinal:  Soft, Non-tender, + BS  Lower Extremities: No edema  Psychiatry: Patient is calm. No agitation. Mood & affect appropriate  Skin: No rashes/ecchymoses/cyanosis/ulcers visualized on the face, hands or feet.      CURRENT MEDICATIONS:  milrinone Infusion 0.125 MICROgram(s)/kG/Min IV Continuous <Continuous>  norepinephrine Infusion 0.05 MICROgram(s)/kG/Min IV Continuous <Continuous>    chlordiazePOXIDE  aspirin  chewable  chlorhexidine 2% Cloths  folic acid  heparin  Infusion  multivitamin  thiamine      DIAGNOSTIC TESTING:  [ ] Echocardiogram:   [ ]  Catheterization:  [ ] Stress Test:    OTHER: 	      LABS:	 	  CARDIAC MARKERS ( 04 Feb 2022 04:36 )  x     / 0.26 ng/mL / x     / x     / x      p-BNP 04 Feb 2022 04:36: 1356 pg/mL, CARDIAC MARKERS ( 03 Feb 2022 10:03 )  x     / 0.27 ng/mL / x     / x     / x      p-BNP 03 Feb 2022 10:03: 2970 pg/mL                          16.0   6.57  )-----------( 68       ( 04 Feb 2022 04:36 )             45.1     02-04    136  |  93<L>  |  29.9<H>  ----------------------------<  99  3.5   |  29.0  |  0.72    Ca    8.0<L>      04 Feb 2022 04:36  Phos  2.7     02-04  Mg     1.6     02-04    TPro  5.4<L>  /  Alb  3.3  /  TBili  5.5<H>  /  DBili  x   /  AST  1932<H>  /  ALT  1548<H>  /  AlkPhos  124<H>  02-04    proBNP: Serum Pro-Brain Natriuretic Peptide: 1356 pg/mL (02-04 @ 04:36)  Serum Pro-Brain Natriuretic Peptide: 2970 pg/mL (02-03 @ 10:03)    Lipid Profile:   HgA1c:   TSH: Thyroid Stimulating Hormone, Serum: 1.40 uIU/mL        TELEMETRY: Reviewed    ECG:  Reviewed by me. 	                                                                      [place] [facility]                                                               Office: [address] [address]                                                              Telephone: [phone]. Fax:[phone]                                                                             PROGRESS NOTE  Reason for follow up: Cardiogenic shock  Update: Patient tolerated IABP 1:3 yesterday, but increased to 1:1 overnight. Patient still on milrinone and levophed, with plans to remove IABP today. Patient is negative 4.8 L at this time, clinically euvolemic. Patient with atrial flutter with RVR overnight, currently in SR with frequent PACS and PVCs.   Covid Status: Pending    Review of symptoms:   Cardiac:  No chest pain. No dyspnea. No palpitations.  Respiratory: No cough. No dyspnea  Gastrointestinal: No diarrhea. No abdominal pain. No bleeding.     Past medical history: No updates.   	  Vitals:  T(C): 36.6 (02-04-22 @ 11:30), Max: 37 (02-04-22 @ 00:00)  HR: 112 (02-04-22 @ 11:00) (75 - 124)  BP: --  RR: 20 (02-04-22 @ 11:00) (15 - 26)  SpO2: 98% (02-04-22 @ 11:00) (90% - 99%)  Wt(kg): --  I&O's Summary    03 Feb 2022 07:01  -  04 Feb 2022 07:00  --------------------------------------------------------  IN: 528.2 mL / OUT: 5420 mL / NET: -4891.8 mL    04 Feb 2022 07:01  -  04 Feb 2022 11:49  --------------------------------------------------------  IN: 80.4 mL / OUT: 676 mL / NET: -595.6 mL      Weight (kg): 76.9 (02-02 @ 19:05)      PHYSICAL EXAM:  Constitutional: Comfortable . No acute distress.   HEENT: Atraumatic and normocephalic , neck is supple . no JVD. No carotid bruit. PEERL +swan tejas, sclera icteric   CNS: A&Ox3. No focal deficits. EOMI. Cranial nerves II-IX are intact.   Lymph Nodes: Cervical : Not palpable.  Respiratory: Decrease BS at b/l bases  Cardiovascular: S1S2 RRR. No rubs or gallop. II/VI systolic ejection murmur lsb  Gastrointestinal: Soft non-tender and non distended . +Bowel sounds. negative Chiu's sign.  Extremities: 1+ pitting LE edema, warm extremities, +IABP  Psychiatric: Calm . no agitation.  Skin: No skin rash/ulcers visualized to face, hands or feet.      CURRENT MEDICATIONS:  milrinone Infusion 0.125 MICROgram(s)/kG/Min IV Continuous <Continuous>  norepinephrine Infusion 0.05 MICROgram(s)/kG/Min IV Continuous <Continuous>    chlordiazePOXIDE  aspirin  chewable  chlorhexidine 2% Cloths  folic acid  heparin  Infusion  multivitamin  thiamine      DIAGNOSTIC TESTING:  [ ] Echocardiogram:   < from: TTE Echo Complete w/o Contrast w/ Doppler (02.03.22 @ 11:01) >  PHYSICIAN INTERPRETATION:  Left Ventricle: Endocardial visualization was enhanced with intravenous   echo contrast. The left ventricular internal cavity size is mildly   increased. Left ventricular wall thickness is normal.  Global LV systolic function was severely decreased. Left ventricular   ejection fraction, by visual estimation, is 20 to 25%. Spectral Doppler   shows impaired relaxation pattern of left ventricular myocardial filling   (Grade I diastolic dysfunction).  Right Ventricle: The right ventricular size is mildly enlarged. RV   systolic function is normal.  Left Atrium: The left atrium is normal in size.  Right Atrium: The right atrium is normal in size.  Pericardium: Trivial pericardial effusion is present.  Mitral Valve: The mitral valve leaflets are tethered whichis due to   reduced systolic function and elevated LVDP. There is mild mitral annular   calcification. Mild to moderate mitral valve regurgitation is seen.  Tricuspid Valve: The tricuspid valve is normal in structure. Mild   tricuspid regurgitation is visualized. Estimated pulmonary artery   systolic pressure is 42.0 mmHg assuming a right atrial pressure of 15   mmHg, which is consistent with mild pulmonary hypertension.  Aortic Valve: The aortic valve was not well visualized. Sclerotic aortic   valve with normal opening. Mild aortic valve regurgitation is seen.  Pulmonic Valve: The pulmonic valve was not well visualized. Trace   pulmonic valve regurgitation.  Aorta: The aortic root is normal in size and structure.  Pulmonary Artery: The pulmonary artery is not well seen.  Venous: The inferior vena cava was dilated, with respiratory size   variation less than 50%.  Additional Comments: A venous catheter is visualized in the right atrium   and right ventricle.  In comparison to the previous echocardiogram(s): There are no prior   studies on this patient for comparison purposes.      Summary:   1. Technically difficult study.   2. Left ventricular ejection fraction, by visual estimation, is 20 to   25%.   3. Severely decreased global left ventricular systolic function.   4. Spectral Doppler shows impaired relaxation pattern of left   ventricular myocardial filling (Grade I diastolic dysfunction).   5. Mildly enlarged right ventricle.   6. The left atrium is normal in size.   7. Mildmitral annular calcification.   8. Mild to moderate mitral valve regurgitation.   9. The mitral valve leaflets are tethered which is due to reduced   systolic function and elevated LVDP.  10. Mild tricuspid regurgitation.  11. Estimated pulmonary artery systolic pressure is 42.0 mmHg assuming a   right atrial pressure of 15 mmHg, which is consistent with mild pulmonary   hypertension.  12. Mild aortic regurgitation.  13. Sclerotic aortic valve with normal opening.  14. Trivial pericardial effusion.  15. Endocardial visualization was enhanced with intravenous echo contrast.  16. Pulse wave Doppler in the descending aorta is consistent with IABP in   place.  17. There are no prior studies on this patient for comparison purposes.    Mounika Huerta D.O. Electronically signed on 2/3/2022 at 4:14:35 PM    < end of copied text >      OTHER: 	      LABS:	 	  CARDIAC MARKERS ( 04 Feb 2022 04:36 )  x     / 0.26 ng/mL / x     / x     / x      p-BNP 04 Feb 2022 04:36: 1356 pg/mL, CARDIAC MARKERS ( 03 Feb 2022 10:03 )  x     / 0.27 ng/mL / x     / x     / x      p-BNP 03 Feb 2022 10:03: 2970 pg/mL                          16.0   6.57  )-----------( 68       ( 04 Feb 2022 04:36 )             45.1     02-04    136  |  93<L>  |  29.9<H>  ----------------------------<  99  3.5   |  29.0  |  0.72    Ca    8.0<L>      04 Feb 2022 04:36  Phos  2.7     02-04  Mg     1.6     02-04    TPro  5.4<L>  /  Alb  3.3  /  TBili  5.5<H>  /  DBili  x   /  AST  1932<H>  /  ALT  1548<H>  /  AlkPhos  124<H>  02-04    proBNP: Serum Pro-Brain Natriuretic Peptide: 1356 pg/mL (02-04 @ 04:36)  Serum Pro-Brain Natriuretic Peptide: 2970 pg/mL (02-03 @ 10:03)    Lipid Profile:   HgA1c:   TSH: Thyroid Stimulating Hormone, Serum: 1.40 uIU/mL        TELEMETRY: Reviewed  SR, PACS, NSVT, Atrial flutter with RVR

## 2022-02-04 NOTE — PROGRESS NOTE ADULT - SUBJECTIVE AND OBJECTIVE BOX
Very pleasant 81 year old male patient with a history of HTN, macular degeneration, and daily ETOH consumption who presented to [facility] on  c/o weakness, dyspnea on exertion, intermittent chest pain, and decreased PO intake. Patient reports that for the past month he has not been feeling himself, finds himself more tired, and has lost his appetite. He also noticed an inability to lie flat while sleeping at night and has had to increase the amount of pillows he uses. He presented to his PMD who performed an EKG and found rapid atrial flutter. Referred to [facility]. Underwent cardiac catheterization which was revealing of EF 10-15% and 70% mid LAD occlusion. IABP placed. [place] presley catheter inserted. Cardiac index 1.8. Milrinone infusion initiated at 0.125mcg.    Interval Hx 2/3-:   - Doing better, off IABP, but still on milrinone and levophid. Recurrent AFL with RVR this morning, self terminated. Very frequent salvoes of non-sustained AT,. and NSVTs. No AVB during sinus, occasional blocked PACs   - EKG showed automatic measured QTc at 495 ms, but manual QT on sinus beat is only 420 ms with prior RR at 52 bpm, so true QTc is likely around 400 ms. Creatinine normal. LFTs still high        PAST MEDICAL & SURGICAL HISTORY:  ETOH abuse  HTN (hypertension)  Macular degeneration    REVIEW OF SYSTEMS  General: - fever or chills, + fatigue  Skin/Breast: - rashes  Ophthalmologic: - blurred vision	  ENMT: - sore throat  Respiratory and Thorax:	+dyspnea, - cough  Cardiovascular: + ORELLANA, + chest pain, - palpitations  Gastrointestinal:	+decreased appetitive. No change in bowel habits  Genitourinary: - dysuria  Musculoskeletal:	 - arthritis  Neurological: - weaknesses  Psychiatric: - anxiety    MEDICATIONS  (STANDING):  aspirin  chewable 81 milliGRAM(s) Oral daily  chlordiazePOXIDE 25 milliGRAM(s) Oral <User Schedule>  chlorhexidine 2% Cloths 1 Application(s) Topical daily  folic acid 1 milliGRAM(s) Oral daily  heparin  Infusion 1000 Unit(s)/Hr (10 mL/Hr) IV Continuous <Continuous>  milrinone Infusion 0.125 MICROgram(s)/kG/Min (2.81 mL/Hr) IV Continuous <Continuous>  multivitamin 1 Tablet(s) Oral daily  norepinephrine Infusion 0.05 MICROgram(s)/kG/Min (7.21 mL/Hr) IV Continuous <Continuous>  thiamine 100 milliGRAM(s) Oral daily    MEDICATIONS  (PRN):    Allergies  No Known Allergies    SOCIAL HISTORY: former smoker, current daily ETOH (liquor) 6 drinks "for many years"  Works part time now -  at low    FAMILY HISTORY: Father  at age 57 MI.     ICU Vital Signs Last 24 Hrs  T(C): 36.6 (2022 11:30), Max: 37 (2022 00:00)  T(F): 97.9 (2022 11:30), Max: 98.6 (2022 00:00)  HR: 94 (2022 15:00) (75 - 124)  BP: 93/74 (2022 15:00) (93/74 - 109/79)  BP(mean): 80 (2022 15:00) (80 - 91)  ABP: 93/73 (2022 13:00) (76/54 - 116/77)  ABP(mean): 81 (2022 13:00) (57 - 95)  RR: 19 (2022 15:00) (15 - 26)  SpO2: 98% (2022 15:00) (92% - 99%)        Physical Exam:  Constitutional: AAOx3, NAD, appears of stated age.  Neck: supple, No JVD  Cardiovascular: +S1S2 IR. SR with frequent APCs and PVCs.   Pulmonary: Anterior breath sounds. Coarse but unlabored  Abdomen:  soft NTND  Extremities: no edema b/l,   Neuro: non focal, speech clear, ACKERMAN x 4  Psych: appropriate mood and affect.     LABS:                                   16.0   6.57  )-----------( 68       ( 2022 04:36 )             45.1   02-04    136  |  93<L>  |  29.9<H>  ----------------------------<  99  3.5   |  29.0  |  0.72    Ca    8.0<L>      2022 04:36  Phos  2.7     02-04  Mg     1.6     02-04    TPro  5.4<L>  /  Alb  3.3  /  TBili  5.5<H>  /  DBili  x   /  AST  1932<H>  /  ALT  1548<H>  /  AlkPhos  124<H>  02-      RADIOLOGY & ADDITIONAL STUDIES:  CXR 22  IMPRESSION:  Right IJ line [place] catheter with the tip projected over the proximal   pulmonary artery, new.  IABP marker at the AP window.  Right perihilar alveolar infiltrates, new.  Pleural effusions evident on CT    EKG: SR at 74bpm

## 2022-02-04 NOTE — DIETITIAN NUTRITION RISK NOTIFICATION - ADDITIONAL COMMENTS/DIETITIAN RECOMMENDATIONS
Continue diet as tolerated.   Add Ensure Enlive TID to optimize po intake and provide an additional 350 kcal, 20g protein per serving.  Continue MVI, thiamine, and folic acid supplementation.   Encourage po intake, monitor diet tolerance, and provide assistance at meals as needed.   Obtain daily weights to monitor trends.

## 2022-02-04 NOTE — PROGRESS NOTE ADULT - SUBJECTIVE AND OBJECTIVE BOX
[facility] Advanced Heart Failure  Office: [address] [place] [address]  Telephone: [phone]/Fax: [phone]    Subjective: NAEO    Medications:  aspirin  chewable 81 milliGRAM(s) Oral daily  chlordiazePOXIDE 25 milliGRAM(s) Oral <User Schedule>  chlorhexidine 2% Cloths 1 Application(s) Topical daily  folic acid 1 milliGRAM(s) Oral daily  heparin  Infusion 1000 Unit(s)/Hr IV Continuous <Continuous>  milrinone Infusion 0.125 MICROgram(s)/kG/Min IV Continuous <Continuous>  multivitamin 1 Tablet(s) Oral daily  norepinephrine Infusion 0.05 MICROgram(s)/kG/Min IV Continuous <Continuous>  thiamine 100 milliGRAM(s) Oral daily    Vital Signs Last 24 Hours  T(C): 36.6 (02-04-22 @ 08:10), Max: 37 (02-04-22 @ 00:00)  HR: 113 (02-04-22 @ 09:00) (75 - 124)  RR: 19 (02-04-22 @ 09:00) (15 - 26)  SpO2: 92% (02-04-22 @ 09:00) (90% - 99%)    I&O's Summary    03 Feb 2022 07:01  -  04 Feb 2022 07:00  --------------------------------------------------------  IN: 528.2 mL / OUT: 5420 mL / NET: -4891.8 mL    04 Feb 2022 07:01  -  04 Feb 2022 09:52  --------------------------------------------------------  IN: 40.2 mL / OUT: 476 mL / NET: -435.8 mL    Tele: ST, frequent PVCs    Physical Exam:  General: Sleeping comfortably  Neck: JVP difficult to assess. Right IJ PA catheter  Chest: Clear to auscultation bilaterally  CV: Normal S1 and S2. No murmurs, rub, or gallops.   PV: No edema, warm peripherally, 2+ radial pulses b/l, 2+ DP pulses b/l  Abdomen: Soft, non-distended  Skin: warm, dry    Labs:                        16.0   6.57  )-----------( 68       ( 04 Feb 2022 04:36 )             45.1     02-04    136  |  93<L>  |  29.9<H>  ----------------------------<  99  3.5   |  29.0  |  0.72    Ca    8.0<L>      04 Feb 2022 04:36  Phos  2.7     02-04  Mg     1.6     02-04    TPro  5.4<L>  /  Alb  3.3  /  TBili  5.5<H>  /  DBili  x   /  AST  1932<H>  /  ALT  1548<H>  /  AlkPhos  124<H>  02-04    PT/INR - ( 03 Feb 2022 19:31 )   PT: 27.7 sec;   INR: 2.49 ratio       PTT - ( 03 Feb 2022 19:31 )  PTT:94.0 sec    CARDIAC MARKERS ( 04 Feb 2022 04:36 )  x     / 0.26 ng/mL / x     / x     / x      CARDIAC MARKERS ( 03 Feb 2022 10:03 )  x     / 0.27 ng/mL / x     / x     / x        Serum Pro-Brain Natriuretic Peptide: 1356 pg/mL (02-04 @ 04:36)  Serum Pro-Brain Natriuretic Peptide: 2970 pg/mL (02-03 @ 10:03)    Lactate, Blood: 1.3 mmol/L (02-04 @ 04:36)  Lactate, Blood: 1.4 mmol/L (02-03 @ 19:31)  Lactate, Blood: 1.5 mmol/L (02-03 @ 05:08)  Lactate, Blood: 1.7 mmol/L (02-02 @ 20:50)

## 2022-02-04 NOTE — PROGRESS NOTE ADULT - ASSESSMENT
82 y/o M with a h/o EtOH abuse, HTN, and macular degeneration, with cardiogenic shock, acute systolic biventricular heart failure, JOON, shock liver, AF with RVR.    Cardiogenic shock believed to be primarily nonischemic (EtOH related?). 70% LAD lesion not stented as degree of cardiomyopathy thought to be out of proportion to amount of stenosis.    - restarted on norepinephrine infusion to maintain a MAP > 65 and augmented pressure > 100  - maintain milrinone @ 0.25mcg/kg/min  - maintain IABP at 1:1 augmentation, tolerated trial of reduction to 1:3, tentative plan to remove device later this morning  - repeat MVO2 72, will repeat again later this morning  - monitoring PA pressures, CI 2.9  - gradual worsening of AF with RVR through the night (HR now 120s/130s), will consider halving milrinone dose rate, would like to avoid phenylephrine given severe BiV failure, may ultimately require DCCV vs amiodarone  - maintain therapeutic a/c with heparin gtt  - JOON secondary to ischemic ATN, renal indices improving on repeat lab work, UOP robust with ATC IV diuresis  - trend BUN/Cr, monitor lytes, no indication for urgent HD at this time  - LFTs still severely elevated but downtrending with improved R-->L flow and restored perfusion, avoid hepatotoxic agents

## 2022-02-04 NOTE — PROGRESS NOTE ADULT - ATTENDING COMMENTS
Pt is seen, examined, chart reviewed, d/w np/pa.  Management as outlined above.  Cardiogenic Shock with Decompensated HFrEF    Echo EF 20-25%, grade I DD, mildly enlarged RV, mild to moderate MR, mild TR, mild pulmonary HTN, mild AI.   Weaned IABP 1:3, tolerating well with mixed venous 71.

## 2022-02-04 NOTE — DIETITIAN INITIAL EVALUATION ADULT. - OTHER INFO
81 year old male with PMH of EtOH abuse, HTN, and macular degeneration, initially admitted to [facility] on 2/1 after being instructed to go to ED when outpatient EKG revealed atrial flutter w/ RVR. Patient c/o weakness, dyspnea on exertion, intermittent chest pain, and decreased PO intake. Subsequently underwent cardiac catheterization which was revealing of 70% mid LAD occlusion and estimated LVEF of 10-15%. No intervention performed on coronaries, although IABP was placed as well as a PA catheter. Hospital course complicated by cardiogenic shock and MSOF. IV vasopressor added for hemodynamic support. Attempted to interview, pt sleeping, arousable but lethargic. Noted pt with decreased po intake prior to admission, currently pt has been with poor po intake. Uncertain of weight history, pt appears thin. RD to follow up.

## 2022-02-04 NOTE — PROGRESS NOTE ADULT - SUBJECTIVE AND OBJECTIVE BOX
Patient is a 81y old  Male who presents with a chief complaint of Cardiogenic shock (03 Feb 2022 12:34)      BRIEF HOSPITAL COURSE: 82 y/o M with a h/o EtOH abuse, HTN, and macular degeneration, initially admitted to [facility] on 2/1 after being instructed to go to ED when outpatient EKG revealed atrial flutter w/ RVR. Patient c/o weakness, dyspnea on exertion, intermittent chest pain, and decreased PO intake. Subsequently underwent cardiac catheterization which was revealing of 70% mid LAD occlusion and estimated LVEF of 10-15%. No intervention performed on coronaries, although IABP was placed as well as a PA catheter. Started on inotropic support with milrinone and transferred to [facility] for further management. Hospital course complicated by cardiogenic shock and MSOF. IV vasopressor added for hemodynamic support.    Events last 24 hours: Milrinone infusion dose rate was increased in attempt to wean norepi- transiently weaned off, but now restarted for recurrent hypotension. Progressive AF with RVR overnight. IABP back on 1:1 support. MVO2 72. Robust UOP.        PAST MEDICAL & SURGICAL HISTORY:  ETOH abuse    HTN (hypertension)    Macular degeneration        Review of Systems:  CONSTITUTIONAL: No fever, chills, or fatigue  EYES: No eye pain, visual disturbances, or discharge  ENMT:  No difficulty hearing, tinnitus, vertigo; No sinus or throat pain  NECK: No pain or stiffness  RESPIRATORY: No cough, wheezing, chills or hemoptysis; No shortness of breath  CARDIOVASCULAR: No chest pain, palpitations, dizziness, or leg swelling  GASTROINTESTINAL: No abdominal or epigastric pain. No nausea, vomiting, or hematemesis; No diarrhea or constipation. No melena or hematochezia.  GENITOURINARY: No dysuria, frequency, hematuria, or incontinence  NEUROLOGICAL: No headaches, memory loss, loss of strength, numbness, or tremors  SKIN: No itching, burning, rashes, or lesions   MUSCULOSKELETAL: No joint pain or swelling; No muscle, back, or extremity pain  PSYCHIATRIC: No depression, anxiety, mood swings, or difficulty sleeping      Medications:    furosemide   Injectable 40 milliGRAM(s) IV Push every 12 hours  milrinone Infusion 0.25 MICROgram(s)/kG/Min IV Continuous <Continuous>  norepinephrine Infusion 0.05 MICROgram(s)/kG/Min IV Continuous <Continuous>  aspirin  chewable 81 milliGRAM(s) Oral daily  heparin  Infusion 1000 Unit(s)/Hr IV Continuous <Continuous>  folic acid 1 milliGRAM(s) Oral daily  multivitamin 1 Tablet(s) Oral daily  thiamine 100 milliGRAM(s) Oral daily  chlorhexidine 2% Cloths 1 Application(s) Topical daily        ICU Vital Signs Last 24 Hrs  T(C): 37 (04 Feb 2022 00:00), Max: 37 (04 Feb 2022 00:00)  T(F): 98.6 (04 Feb 2022 00:00), Max: 98.6 (04 Feb 2022 00:00)  HR: 107 (04 Feb 2022 00:06) (59 - 109)  BP: --  BP(mean): --  ABP: 114/78 (04 Feb 2022 00:06) (76/54 - 116/77)  ABP(mean): 93 (04 Feb 2022 00:06) (57 - 95)  RR: 17 (04 Feb 2022 00:06) (15 - 24)  SpO2: 94% (04 Feb 2022 00:06) (89% - 99%)          I&O's Detail    02 Feb 2022 07:01  -  03 Feb 2022 07:00  --------------------------------------------------------  IN:    Heparin: 120 mL    IV PiggyBack: 300 mL    Milrinone: 34.8 mL    Norepinephrine: 36.2 mL    Oral Fluid: 115 mL  Total IN: 606 mL    OUT:    Indwelling Catheter - Urethral (mL): 6200 mL  Total OUT: 6200 mL    Total NET: -5594 mL      03 Feb 2022 07:01  -  04 Feb 2022 00:40  --------------------------------------------------------  IN:    Heparin: 170 mL    IV PiggyBack: 100 mL    Milrinone: 14.5 mL    Milrinone: 68.4 mL    Norepinephrine: 41.7 mL  Total IN: 394.6 mL    OUT:    Indwelling Catheter - Urethral (mL): 4505 mL  Total OUT: 4505 mL    Total NET: -4110.4 mL            LABS:                        16.3   6.52  )-----------( 62       ( 03 Feb 2022 19:31 )             46.5     02-03    137  |  95<L>  |  37.4<H>  ----------------------------<  99  3.7   |  27.0  |  1.02    Ca    8.3<L>      03 Feb 2022 19:31  Phos  2.6     02-03  Mg     1.8     02-03    TPro  5.7<L>  /  Alb  3.6  /  TBili  5.4<H>  /  DBili  x   /  AST  2946<H>  /  ALT  1741<H>  /  AlkPhos  131<H>  02-03      CARDIAC MARKERS ( 03 Feb 2022 10:03 )  x     / 0.27 ng/mL / x     / x     / x          CAPILLARY BLOOD GLUCOSE        PT/INR - ( 03 Feb 2022 19:31 )   PT: 27.7 sec;   INR: 2.49 ratio         PTT - ( 03 Feb 2022 19:31 )  PTT:94.0 sec    CULTURES:        Physical Examination:    General: No acute distress.  Alert, oriented, interactive, nonfocal    HEENT: Pupils equal, reactive to light.  Symmetric.    PULM: Clear to auscultation bilaterally, no significant sputum production    CVS: tachycardic, irreg irreg rhythm, no murmurs, rubs, or gallops    ABD: Soft, nondistended, nontender, normoactive bowel sounds, no masses    EXT: No edema, nontender, right groin IABP insertion site c/d/i, no oozing or palpable hematoma    SKIN: Warm and well perfused, no rashes noted.    NEURO: awake and alert, no focal deficits        RADIOLOGY:     < from: Xray Chest 1 View- PORTABLE-Urgent (Xray Chest 1 View- PORTABLE-Urgent .) (02.02.22 @ 21:44) >  FINDINGS: Right IJ [place] catheter with the tip projected over the   proximal right pulmonary artery, new. Mild cardiomegaly and normal   pulmonary vasculature with no lobar consolidation.    Patchy right perihilar alveolar infiltrates, new. Pleural effusions   evident on CT.    IABP marker projected over the AP window.    IMPRESSION:  Right IJ line [place] catheter with the tip projected over the proximal   pulmonary artery, new.    IABP marker at the AP window.    Right perihilar alveolar infiltrates, new.    Pleural effusions evident on CT            CRITICAL CARE TIME SPENT: 36 mins  Time spent evaluating/treating patient with medical issues that pose a high risk for life threatening deterioration and/or end-organ damage, reviewing data/labs/imaging, discussing case with multidisciplinary team, discussing plan/goals of care with patient/family. Non-inclusive of procedure time.

## 2022-02-04 NOTE — PROGRESS NOTE ADULT - ASSESSMENT
This is an 81 year old male patient with a history of HTN, macular degeneration, and daily ETOH consumption who is admitted with acute systolic heart failure (non-ischemic), EF 10-15%, and new onset atrial flutter/fibrillation. CHADSVASC: 5    Interval Hx 2/3-2/4:   - Doing better, off IABP, but still on milrinone and levophid. Recurrent AFL with RVR this morning, self terminated. Very frequent salvoes of non-sustained AT,. and NSVTs. No AVB during sinus, occasional blocked PACs   - EKG showed automatic measured QTc at 495 ms, but manual QT on sinus beat is only 420 ms with prior RR at 52 bpm, so true QTc is likely around 400 ms. Creatinine normal. LFTs still high    1. AFL, now in sinus, will need AC and eventually ablation as rate control is not always feasible for AFL  - Start dofetilide 500 mcg PO Q12 hours. Measure QTc (manually if there is ectopy) 3 hours after first dose. If QTc is less than 460 ms then can continue with same dose and do EKG 3 hours after the 4 subsequent doses and HOLD and sk EP to r/a if QTc got longer than 460 ms. If first QTc (3 h after first dose) was 460-500 ms then lower dose to 250 mcg q12 hours and follow as above. If first QTc was > 500 ms then stop dofetilide. Patient was counseled about potenital pro-arrhythmia including VT/VF with dofetilide, will monitor on tele. Above d/w MICU team     2. AFIB, now in sinus, sounds like it self converted, counseled. Need to cut on alcohol. Need AC. Would avoid AAT at this stage as the only AAT options are amiodrone which cannot be used due to liver failure and dofetilide which may worsen the bradycardia. Will aim to use low dose BB when cleared for that by cardiology/ICU and HF.  - Dofetilide as above     3. Ectopy, rhythm reviewed. lots of PACs, PVCs and salvos of AT. Probably secondary to HF. May interfere with proper IABP function. Will monitor for now. Does not require specific therapy. Likely to improve when inotrope and pressors are weaned down     4. Bradycardia and intermittent/occasional drop P waves. These are most c/w non conducted PACs rather than AVb during sinus, will monitor for now. I suspect he will tolerate BB when safe to do so from a HF stand point. No current indication for pacing support    5. HF/cardiogenic shock. on IABP and milrinone and Levo. Cardiology, HF and ICU are involved. Will consider LifeVest prior to DC and r/a EF in 3 months after rhythm control, alcohol abstinence and GDMT before advising for ICD    Will follow up

## 2022-02-04 NOTE — PROGRESS NOTE ADULT - PROBLEM SELECTOR PLAN 1
Mixed ischemic and non-ischemic cardiomyopathy   LVEF 10-15%, LVEDD 6.1, BiV involvement   Clinically euvolemic, warm and well perfused  IABP currently switched  to 1:3 @ 10:48am, send MVO2 sat in 30 minutes. Plan to remove today once INR <2.0.  Inotropes: Continue Milrinone @ 0.125mcg/kg/min  Pressors: Levophed 0.05 mcg/kg/min, wean as able  Hold diuretics for now, may use PRN (maintain net even/slightly negative, CVP <10)  Monitor markers of perfusion daily including lactate, LFTs, MVO2  Hemodynamic goals: CVP <10, MVO2 65-70%, augmented BP >90, CI 2.2.

## 2022-02-04 NOTE — PROGRESS NOTE ADULT - ASSESSMENT
82 y/o male with PMH significant for HTN, EtOH abuse and glaucoma who presented to [facility] [place] on 2[place]1 with c/o CP and ORELLANA. He was noted to be in atrial flutter w/ RVR. Pt. underwent RHC/LHC which revealed 70% mLAD lesion and LVEF 10-15% with low CI ~1.8. IABP and PA cath placed and patient transferred to [facility] 2/2 for further management of cardiogenic shock.    2/3/22: Pt hemodynamically stable, warm and well perfused with very good UOP response to diuretics. Would attempt to wean pressors and possibly IABP as well. EP consulted for aflutter/afib.    2/4/22: Low dose Levophed resumed overnight to maintain augmented BP . Negative L, diuretics held today. Plan to remove IABP.     Hemodynamics:    2/4/22: Milrinone 0.125mcg/kg/min, Levo 0.05mcg/kg/min , BP 91/70/79, augmented 100, CVP 5, PAP 37/11/19, TD CI 2.2, MVO2 74.7 (IABP 1:1)    2/3/22: Milrinone 0.125mcg/kg/min HR 98, BP 92/62/81, augmented 100, CVP 7-9, PAP 38/15/22, TD CO/CI 6.3/3.2, BRAN CO/CI 3.7/1.9. MVO2 70% on 1:1 and 72% on 1:3.

## 2022-02-04 NOTE — DIETITIAN INITIAL EVALUATION ADULT. - PERTINENT MEDS FT
MEDICATIONS  (STANDING):  aspirin  chewable 81 milliGRAM(s) Oral daily  chlordiazePOXIDE 25 milliGRAM(s) Oral <User Schedule>  chlorhexidine 2% Cloths 1 Application(s) Topical daily  folic acid 1 milliGRAM(s) Oral daily  heparin  Infusion 1000 Unit(s)/Hr (10 mL/Hr) IV Continuous <Continuous>  milrinone Infusion 0.125 MICROgram(s)/kG/Min (2.81 mL/Hr) IV Continuous <Continuous>  multivitamin 1 Tablet(s) Oral daily  norepinephrine Infusion 0.05 MICROgram(s)/kG/Min (7.21 mL/Hr) IV Continuous <Continuous>  thiamine 100 milliGRAM(s) Oral daily    MEDICATIONS  (PRN):

## 2022-02-04 NOTE — DIETITIAN INITIAL EVALUATION ADULT. - PERTINENT LABORATORY DATA
02-04 Na136 mmol/L Glu 99 mg/dL K+ 3.5 mmol/L Cr  0.72 mg/dL BUN 29.9 mg/dL<H> Phos 2.7 mg/dL Alb 3.3 g/dL PAB n/a

## 2022-02-04 NOTE — DIETITIAN INITIAL EVALUATION ADULT. - ETIOLOGY
related to inability to meet sufficient protein-energy in setting of alcohol abuse, decreased appetite, cardiogenic shock, acute systolic biventricular heart failure, shock liver, AF with RVR

## 2022-02-04 NOTE — PROGRESS NOTE ADULT - ASSESSMENT
A/P: 80 y/o M with a PMHx of HTN, macular degeneration, and EtOH abuse who presented to [facility] with complaints of chest pain, dyspnea on exertion, and weakness. Patient states that for "a long time" he has been experiencing dyspnea on exertion and orthopnea, but was "ignoring his symptoms." Patient states starting about 10 days ago the symptoms became much worse with some associated chest pressure with exertion as well, weakness, leg swelling, and decreased PO intake. Patient saw his PMD that showed new onset atrial flutter with RVR, and he was sent to [facility]. Patient underwent a LHC that showed EF 10-15% with mLAD 70% lesion, and had IABP with [place] catheter placed. Patient was found to have a cardiac index of 1.8, and was started on milrinone 0.125 mcg. Patient was transferred to [facility] for further management. Patient currently has IABP on 1:1 on milrinone and levophed, lasix 40mg IV BID, and negative 5 L. Patient states his breathing is improving, and he feels better then when he initially presented to [facility]. Patient states he has never seen a Cardiologist before or had any previous workup. Patient denies fevers, chills, syncope, near syncope, abdominal pain, N/V/D, headache, or dizziness.     Cardiogenic Shock with Decompensated HFrEF   - Echo EF 20-25%, grade I DD, mildly enlarged RV, mild to moderate MR, mild TR, mild pulmonary HTN, mild AI.   - Weaned IABP 1:3, tolerating well with mixed venous 71.   - Heparin gtt on hold, will check PTT, PT/INR in 1 hour.   - Interventional cardiology NP to remove IABP today.   - Wean off levophed first if able.   - Continue milrinone.   - CVP 3, hold diuresis.  - Will add GDMT when BP allows.   - Heart failure following.   - Monitor on telemetry.    - Strict i/o and daily weights.    - Keep K > 4, Mg > 2.    - Monitor renal function with ongoing diuresis.    CAD  - mLAD lesion 70% on LHC, not likely to be cause of cardiogenic shock.   - Medical management at this time.   - Continue aspirin.   - Unable to start statin due to transaminitis.   - Echo as above.     Atrial Fibrillation/Flutter  - New onset per patient.   - On milrinone and levophed.   - Wean levophed when able.  - EP following.   - [id number] score 5 (LV dysfunction, HTN, age, vascular disease)  - On heparin gtt.     Assessment and recommendations are final when note is signed by the attending.

## 2022-02-05 LAB
ALBUMIN SERPL ELPH-MCNC: 3.1 G/DL — LOW (ref 3.3–5.2)
ALBUMIN SERPL ELPH-MCNC: 3.3 G/DL — SIGNIFICANT CHANGE UP (ref 3.3–5.2)
ALP SERPL-CCNC: 120 U/L — SIGNIFICANT CHANGE UP (ref 40–120)
ALP SERPL-CCNC: 141 U/L — HIGH (ref 40–120)
ALT FLD-CCNC: 901 U/L — HIGH
ALT FLD-CCNC: 936 U/L — HIGH
ANION GAP SERPL CALC-SCNC: 12 MMOL/L — SIGNIFICANT CHANGE UP (ref 5–17)
ANION GAP SERPL CALC-SCNC: 9 MMOL/L — SIGNIFICANT CHANGE UP (ref 5–17)
APTT BLD: 58.7 SEC — HIGH (ref 27.5–35.5)
APTT BLD: 65.8 SEC — HIGH (ref 27.5–35.5)
AST SERPL-CCNC: 542 U/L — HIGH
AST SERPL-CCNC: 660 U/L — HIGH
BASE EXCESS BLDMV CALC-SCNC: 14.6 MMOL/L — HIGH (ref -3–3)
BASOPHILS # BLD AUTO: 0.03 K/UL — SIGNIFICANT CHANGE UP (ref 0–0.2)
BASOPHILS # BLD AUTO: 0.04 K/UL — SIGNIFICANT CHANGE UP (ref 0–0.2)
BASOPHILS NFR BLD AUTO: 0.7 % — SIGNIFICANT CHANGE UP (ref 0–2)
BASOPHILS NFR BLD AUTO: 0.9 % — SIGNIFICANT CHANGE UP (ref 0–2)
BILIRUB SERPL-MCNC: 5.5 MG/DL — HIGH (ref 0.4–2)
BILIRUB SERPL-MCNC: 5.5 MG/DL — HIGH (ref 0.4–2)
BUN SERPL-MCNC: 17.4 MG/DL — SIGNIFICANT CHANGE UP (ref 8–20)
BUN SERPL-MCNC: 19.1 MG/DL — SIGNIFICANT CHANGE UP (ref 8–20)
CA-I BLD-SCNC: 1.02 MMOL/L — LOW (ref 1.15–1.33)
CALCIUM SERPL-MCNC: 7.9 MG/DL — LOW (ref 8.6–10.2)
CALCIUM SERPL-MCNC: 8.3 MG/DL — LOW (ref 8.6–10.2)
CHLORIDE SERPL-SCNC: 96 MMOL/L — LOW (ref 98–107)
CHLORIDE SERPL-SCNC: 97 MMOL/L — LOW (ref 98–107)
CO2 SERPL-SCNC: 26 MMOL/L — SIGNIFICANT CHANGE UP (ref 22–29)
CO2 SERPL-SCNC: 31 MMOL/L — HIGH (ref 22–29)
COHGB MFR BLDMV: 1.9 % — SIGNIFICANT CHANGE UP
CREAT SERPL-MCNC: 0.52 MG/DL — SIGNIFICANT CHANGE UP (ref 0.5–1.3)
CREAT SERPL-MCNC: 0.54 MG/DL — SIGNIFICANT CHANGE UP (ref 0.5–1.3)
EOSINOPHIL # BLD AUTO: 0.04 K/UL — SIGNIFICANT CHANGE UP (ref 0–0.5)
EOSINOPHIL # BLD AUTO: 0.06 K/UL — SIGNIFICANT CHANGE UP (ref 0–0.5)
EOSINOPHIL NFR BLD AUTO: 0.9 % — SIGNIFICANT CHANGE UP (ref 0–6)
EOSINOPHIL NFR BLD AUTO: 1.4 % — SIGNIFICANT CHANGE UP (ref 0–6)
GLUCOSE SERPL-MCNC: 136 MG/DL — HIGH (ref 70–99)
GLUCOSE SERPL-MCNC: 145 MG/DL — HIGH (ref 70–99)
HCO3 BLDMV-SCNC: 38 MMOL/L — HIGH (ref 20–28)
HCT VFR BLD CALC: 42.7 % — SIGNIFICANT CHANGE UP (ref 39–50)
HCT VFR BLD CALC: 48.7 % — SIGNIFICANT CHANGE UP (ref 39–50)
HGB BLD-MCNC: 14.6 G/DL — SIGNIFICANT CHANGE UP (ref 13–17)
HGB BLD-MCNC: 16.9 G/DL — SIGNIFICANT CHANGE UP (ref 13–17)
HGB FLD-MCNC: 15.4 G/DL — SIGNIFICANT CHANGE UP (ref 12.6–17.4)
IMM GRANULOCYTES NFR BLD AUTO: 0.2 % — SIGNIFICANT CHANGE UP (ref 0–1.5)
IMM GRANULOCYTES NFR BLD AUTO: 0.5 % — SIGNIFICANT CHANGE UP (ref 0–1.5)
INR BLD: 1.75 RATIO — HIGH (ref 0.88–1.16)
LACTATE SERPL-SCNC: 1.2 MMOL/L — SIGNIFICANT CHANGE UP (ref 0.5–2)
LACTATE SERPL-SCNC: 2.1 MMOL/L — HIGH (ref 0.5–2)
LYMPHOCYTES # BLD AUTO: 0.63 K/UL — LOW (ref 1–3.3)
LYMPHOCYTES # BLD AUTO: 0.72 K/UL — LOW (ref 1–3.3)
LYMPHOCYTES # BLD AUTO: 14.7 % — SIGNIFICANT CHANGE UP (ref 13–44)
LYMPHOCYTES # BLD AUTO: 16.7 % — SIGNIFICANT CHANGE UP (ref 13–44)
MAGNESIUM SERPL-MCNC: 1.6 MG/DL — SIGNIFICANT CHANGE UP (ref 1.6–2.6)
MAGNESIUM SERPL-MCNC: 2.5 MG/DL — SIGNIFICANT CHANGE UP (ref 1.6–2.6)
MCHC RBC-ENTMCNC: 31.3 PG — SIGNIFICANT CHANGE UP (ref 27–34)
MCHC RBC-ENTMCNC: 32.1 PG — SIGNIFICANT CHANGE UP (ref 27–34)
MCHC RBC-ENTMCNC: 34.2 GM/DL — SIGNIFICANT CHANGE UP (ref 32–36)
MCHC RBC-ENTMCNC: 34.7 GM/DL — SIGNIFICANT CHANGE UP (ref 32–36)
MCV RBC AUTO: 91.6 FL — SIGNIFICANT CHANGE UP (ref 80–100)
MCV RBC AUTO: 92.6 FL — SIGNIFICANT CHANGE UP (ref 80–100)
METHGB MFR BLDMV: 0.6 % — SIGNIFICANT CHANGE UP
MONOCYTES # BLD AUTO: 0.65 K/UL — SIGNIFICANT CHANGE UP (ref 0–0.9)
MONOCYTES # BLD AUTO: 0.67 K/UL — SIGNIFICANT CHANGE UP (ref 0–0.9)
MONOCYTES NFR BLD AUTO: 15.2 % — HIGH (ref 2–14)
MONOCYTES NFR BLD AUTO: 15.5 % — HIGH (ref 2–14)
NEUTROPHILS # BLD AUTO: 2.81 K/UL — SIGNIFICANT CHANGE UP (ref 1.8–7.4)
NEUTROPHILS # BLD AUTO: 2.92 K/UL — SIGNIFICANT CHANGE UP (ref 1.8–7.4)
NEUTROPHILS NFR BLD AUTO: 65.2 % — SIGNIFICANT CHANGE UP (ref 43–77)
NEUTROPHILS NFR BLD AUTO: 68.1 % — SIGNIFICANT CHANGE UP (ref 43–77)
O2 CT VFR BLD CALC: <42 MMHG — SIGNIFICANT CHANGE UP (ref 30–65)
PCO2 BLDMV: 54 MMHG — SIGNIFICANT CHANGE UP (ref 30–65)
PH BLDMV: 7.46 — HIGH (ref 7.32–7.45)
PHOSPHATE SERPL-MCNC: 2.8 MG/DL — SIGNIFICANT CHANGE UP (ref 2.4–4.7)
PHOSPHATE SERPL-MCNC: 2.9 MG/DL — SIGNIFICANT CHANGE UP (ref 2.4–4.7)
PLATELET # BLD AUTO: 57 K/UL — LOW (ref 150–400)
PLATELET # BLD AUTO: 61 K/UL — LOW (ref 150–400)
POTASSIUM SERPL-MCNC: 3.4 MMOL/L — LOW (ref 3.5–5.3)
POTASSIUM SERPL-MCNC: 4.1 MMOL/L — SIGNIFICANT CHANGE UP (ref 3.5–5.3)
POTASSIUM SERPL-SCNC: 3.4 MMOL/L — LOW (ref 3.5–5.3)
POTASSIUM SERPL-SCNC: 4.1 MMOL/L — SIGNIFICANT CHANGE UP (ref 3.5–5.3)
PROT SERPL-MCNC: 5 G/DL — LOW (ref 6.6–8.7)
PROT SERPL-MCNC: 5.6 G/DL — LOW (ref 6.6–8.7)
PROTHROM AB SERPL-ACNC: 19.8 SEC — HIGH (ref 10.6–13.6)
RBC # BLD: 4.66 M/UL — SIGNIFICANT CHANGE UP (ref 4.2–5.8)
RBC # BLD: 5.26 M/UL — SIGNIFICANT CHANGE UP (ref 4.2–5.8)
RBC # FLD: 12.4 % — SIGNIFICANT CHANGE UP (ref 10.3–14.5)
RBC # FLD: 12.5 % — SIGNIFICANT CHANGE UP (ref 10.3–14.5)
SODIUM SERPL-SCNC: 135 MMOL/L — SIGNIFICANT CHANGE UP (ref 135–145)
SODIUM SERPL-SCNC: 136 MMOL/L — SIGNIFICANT CHANGE UP (ref 135–145)
WBC # BLD: 4.29 K/UL — SIGNIFICANT CHANGE UP (ref 3.8–10.5)
WBC # BLD: 4.31 K/UL — SIGNIFICANT CHANGE UP (ref 3.8–10.5)
WBC # FLD AUTO: 4.29 K/UL — SIGNIFICANT CHANGE UP (ref 3.8–10.5)
WBC # FLD AUTO: 4.31 K/UL — SIGNIFICANT CHANGE UP (ref 3.8–10.5)

## 2022-02-05 PROCEDURE — 93010 ELECTROCARDIOGRAM REPORT: CPT

## 2022-02-05 PROCEDURE — 99233 SBSQ HOSP IP/OBS HIGH 50: CPT

## 2022-02-05 RX ORDER — POTASSIUM CHLORIDE 20 MEQ
20 PACKET (EA) ORAL ONCE
Refills: 0 | Status: COMPLETED | OUTPATIENT
Start: 2022-02-05 | End: 2022-02-05

## 2022-02-05 RX ORDER — METOPROLOL TARTRATE 50 MG
12.5 TABLET ORAL EVERY 6 HOURS
Refills: 0 | Status: DISCONTINUED | OUTPATIENT
Start: 2022-02-05 | End: 2022-02-06

## 2022-02-05 RX ORDER — MAGNESIUM SULFATE 500 MG/ML
4 VIAL (ML) INJECTION ONCE
Refills: 0 | Status: COMPLETED | OUTPATIENT
Start: 2022-02-05 | End: 2022-02-05

## 2022-02-05 RX ADMIN — CHLORHEXIDINE GLUCONATE 1 APPLICATION(S): 213 SOLUTION TOPICAL at 11:32

## 2022-02-05 RX ADMIN — Medication 81 MILLIGRAM(S): at 11:31

## 2022-02-05 RX ADMIN — Medication 12.5 MILLIGRAM(S): at 23:01

## 2022-02-05 RX ADMIN — Medication 12.5 MILLIGRAM(S): at 17:43

## 2022-02-05 RX ADMIN — HEPARIN SODIUM 1000 UNIT(S)/HR: 5000 INJECTION INTRAVENOUS; SUBCUTANEOUS at 06:12

## 2022-02-05 RX ADMIN — Medication 1 TABLET(S): at 11:31

## 2022-02-05 RX ADMIN — Medication 20 MILLIEQUIVALENT(S): at 08:38

## 2022-02-05 RX ADMIN — Medication 25 MILLIGRAM(S): at 06:27

## 2022-02-05 RX ADMIN — Medication 100 MILLIGRAM(S): at 11:31

## 2022-02-05 RX ADMIN — HEPARIN SODIUM 1000 UNIT(S)/HR: 5000 INJECTION INTRAVENOUS; SUBCUTANEOUS at 00:59

## 2022-02-05 RX ADMIN — DOFETILIDE 500 MICROGRAM(S): 0.25 CAPSULE ORAL at 11:32

## 2022-02-05 RX ADMIN — Medication 12.5 MILLIGRAM(S): at 12:45

## 2022-02-05 RX ADMIN — Medication 25 GRAM(S): at 08:38

## 2022-02-05 RX ADMIN — Medication 1 MILLIGRAM(S): at 11:31

## 2022-02-05 NOTE — PROGRESS NOTE ADULT - SUBJECTIVE AND OBJECTIVE BOX
Pt stable overnight. Off pressor support & milrinone with stable hemodynamics - MAP 70s unsupported. Tele demonstrates sinus rhythm w/ intact conduction, brief salvos WCT - NSVT vs NSAT w/ aberrancy, no sustained arrhythmias. Received     MEDICATIONS  (STANDING):  aspirin  chewable 81 milliGRAM(s) Oral daily  chlorhexidine 2% Cloths 1 Application(s) Topical daily  dofetilide 500 MICROGram(s) Oral every 12 hours  folic acid 1 milliGRAM(s) Oral daily  heparin  Infusion. 1000 Unit(s)/Hr (10 mL/Hr) IV Continuous <Continuous>  milrinone Infusion 0.125 MICROgram(s)/kG/Min (2.81 mL/Hr) IV Continuous <Continuous>  multivitamin 1 Tablet(s) Oral daily  thiamine 100 milliGRAM(s) Oral daily    MEDICATIONS  (PRN):      Allergies    No Known Allergies    Intolerances        Vital Signs Last 24 Hrs  T(C): 36.6 (05 Feb 2022 11:06), Max: 37.4 (05 Feb 2022 04:00)  T(F): 97.9 (05 Feb 2022 11:06), Max: 99.3 (05 Feb 2022 04:00)  HR: 93 (05 Feb 2022 10:00) (66 - 121)  BP: 96/62 (05 Feb 2022 10:00) (82/58 - 120/100)  BP(mean): 73 (05 Feb 2022 10:00) (67 - 107)  RR: 24 (05 Feb 2022 10:00) (13 - 31)  SpO2: 95% (05 Feb 2022 10:00) (90% - 100%)    Physical Exam:  Constitutional: NAD, AAOx3  Cardiovascular: +S1S2 RRR  Pulmonary: CTA b/l, unlabored  GI: soft NTND +BS  Extremities: no pedal edema, +distal pulses b/l  Neuro: non focal, ACKERMAN x4    LABS:                        14.6   4.29  )-----------( 57       ( 05 Feb 2022 05:07 )             42.7     02-05    136  |  96<L>  |  19.1  ----------------------------<  136<H>  3.4<L>   |  31.0<H>  |  0.52    Ca    7.9<L>      05 Feb 2022 05:07  Phos  2.8     02-05  Mg     1.6     02-05    TPro  5.0<L>  /  Alb  3.1<L>  /  TBili  5.5<H>  /  DBili  x   /  AST  660<H>  /  ALT  936<H>  /  AlkPhos  120  02-05    PT/INR - ( 05 Feb 2022 05:07 )   PT: 19.8 sec;   INR: 1.75 ratio         PTT - ( 05 Feb 2022 05:07 )  PTT:65.8 sec      RADIOLOGY & ADDITIONAL TESTS:   Pt stable overnight. Off pressor support & milrinone with stable hemodynamics - MAP 70s unsupported. Tele demonstrates sinus rhythm w/ intact conduction, brief salvos WCT - NSVT vs NSAT w/ aberrancy, no sustained arrhythmias. Received 1 dose dofetilide last PM - EKG performed 3 hours post demonstrated prolonged QT. Reviewed by writer this AM - QTc is 500-515ms by manual measurement.     MEDICATIONS  (STANDING):  aspirin  chewable 81 milliGRAM(s) Oral daily  chlorhexidine 2% Cloths 1 Application(s) Topical daily  dofetilide 500 MICROGram(s) Oral every 12 hours  folic acid 1 milliGRAM(s) Oral daily  heparin  Infusion. 1000 Unit(s)/Hr (10 mL/Hr) IV Continuous <Continuous>  milrinone Infusion 0.125 MICROgram(s)/kG/Min (2.81 mL/Hr) IV Continuous <Continuous>  multivitamin 1 Tablet(s) Oral daily  thiamine 100 milliGRAM(s) Oral daily    Allergies  No Known Allergies    Vital Signs Last 24 Hrs  T(C): 36.6 (05 Feb 2022 11:06), Max: 37.4 (05 Feb 2022 04:00)  T(F): 97.9 (05 Feb 2022 11:06), Max: 99.3 (05 Feb 2022 04:00)  HR: 93 (05 Feb 2022 10:00) (66 - 121)  BP: 96/62 (05 Feb 2022 10:00) (82/58 - 120/100)  BP(mean): 73 (05 Feb 2022 10:00) (67 - 107)  RR: 24 (05 Feb 2022 10:00) (13 - 31)  SpO2: 95% (05 Feb 2022 10:00) (90% - 100%)    Physical Exam:  Constitutional: NAD, AAOx3  Cardiovascular: +S1S2, regular rate, mildly irregular rhythm RRR  Pulmonary: CTA b/l, unlabored  GI: soft NTND +BS  Extremities: no pedal edema, +distal pulses b/l  Neuro: non focal, ACKERMAN x4    LABS:                      14.6   4.29  )-----------( 57       ( 05 Feb 2022 05:07 )             42.7     136  |  96<L>  |  19.1  ----------------------------<  136<H>  3.4<L>   |  31.0<H>  |  0.52    Ca    7.9<L>      05 Feb 2022 05:07  Phos  2.8     02-05  Mg     1.6     02-05    TPro  5.0<L>  /  Alb  3.1<L>  /  TBili  5.5<H>  /  DBili  x   /  AST  660<H>  /  ALT  936<H>  /  AlkPhos  120  02-05    PT/INR - ( 05 Feb 2022 05:07 )   PT: 19.8 sec;   INR: 1.75 ratio      PTT - ( 05 Feb 2022 05:07 )  PTT:65.8 sec    LIVER FUNCTIONS - ( 05 Feb 2022 05:07 )  Alb: 3.1 g/dL / Pro: 5.0 g/dL / ALK PHOS: 120 U/L / ALT: 936 U/L / AST: 660 U/L / GGT: x           RADIOLOGY & ADDITIONAL TESTS:  < from: TTE Echo Complete w/o Contrast w/ Doppler (02.03.22 @ 11:01) >  PHYSICIAN INTERPRETATION:  Left Ventricle: Endocardial visualization was enhanced with intravenous   echo contrast. The left ventricular internal cavity size is mildly   increased. Left ventricular wall thickness is normal.  Global LV systolic function was severely decreased. Left ventricular   ejection fraction, by visual estimation, is 20 to 25%. Spectral Doppler   shows impaired relaxation pattern of left ventricular myocardial filling   (Grade I diastolic dysfunction).  Right Ventricle: The right ventricular size is mildly enlarged. RV   systolic function is normal.  Left Atrium: The left atrium is normal in size.  Right Atrium: The right atrium is normal in size.  Pericardium: Trivial pericardial effusion is present.  Mitral Valve: The mitral valve leaflets are tethered whichis due to   reduced systolic function and elevated LVDP. There is mild mitral annular   calcification. Mild to moderate mitral valve regurgitation is seen.  Tricuspid Valve: The tricuspid valve is normal in structure. Mild   tricuspid regurgitation is visualized. Estimated pulmonary artery   systolic pressure is 42.0 mmHg assuming a right atrial pressure of 15   mmHg, which is consistent with mild pulmonary hypertension.  Aortic Valve: The aortic valve was not well visualized. Sclerotic aortic   valve with normal opening. Mild aortic valve regurgitation is seen.  Pulmonic Valve: The pulmonic valve was not well visualized. Trace   pulmonic valve regurgitation.  Aorta: The aortic root is normal in size and structure.  Pulmonary Artery: The pulmonary artery is not well seen.  Venous: The inferior vena cava was dilated, with respiratory size   variation less than 50%.  Additional Comments: A venous catheter is visualized in the right atrium   and right ventricle.  In comparison to the previous echocardiogram(s): There are no prior   studies on this patient for comparison purposes.      Summary:   1. Technically difficult study.   2. Left ventricular ejection fraction, by visual estimation, is 20 to   25%.   3. Severely decreased global left ventricular systolic function.   4. Spectral Doppler shows impaired relaxation pattern of left   ventricular myocardial filling (Grade I diastolic dysfunction).   5. Mildly enlarged right ventricle.   6. The left atrium is normal in size.   7. Mildmitral annular calcification.   8. Mild to moderate mitral valve regurgitation.   9. The mitral valve leaflets are tethered which is due to reduced   systolic function and elevated LVDP.  10. Mild tricuspid regurgitation.  11. Estimated pulmonary artery systolic pressure is 42.0 mmHg assuming a   right atrial pressure of 15 mmHg, which is consistent with mild pulmonary   hypertension.  12. Mild aortic regurgitation.  13. Sclerotic aortic valve with normal opening.  14. Trivial pericardial effusion.  15. Endocardial visualization was enhanced with intravenous echo contrast.  16. Pulse wave Doppler in the descending aorta is consistent with IABP in   place.  17. There are no prior studies on this patient for comparison purposes.    Mounika Huerta D.O. Electronically signed on 2/3/2022 at 4:14:35 PM    < end of copied text >

## 2022-02-05 NOTE — PROGRESS NOTE ADULT - ATTENDING COMMENTS
alcohol abuse. non-ischemic cardiomyopathy. cardiogenic shock   off milrinon and pressores.  aldactone and lasix for diuresis.  initiate vasodilators slowly if BP stable.    atrial fibrillation with rapid ventricular rate on beta-blocker

## 2022-02-05 NOTE — PROGRESS NOTE ADULT - SUBJECTIVE AND OBJECTIVE BOX
Patient is a 81y old  Male who presents with a chief complaint of Cardiogenic shock (04 Feb 2022 15:49)      BRIEF HOSPITAL COURSE: 80 y/o M with a h/o EtOH abuse, HTN, and macular degeneration, initially admitted to [facility] on 2/1 after being instructed to go to ED when outpatient EKG revealed atrial flutter w/ RVR. Patient c/o weakness, dyspnea on exertion, intermittent chest pain, and decreased PO intake. Subsequently underwent cardiac catheterization which was revealing of 70% mid LAD occlusion and estimated LVEF of 10-15%. No intervention performed on coronaries, although IABP was placed as well as a PA catheter. Started on inotropic support with milrinone and transferred to [facility] for further management. Hospital course complicated by cardiogenic shock and MSOF. IV vasopressor added for hemodynamic support.    Events last 24 hours: Weaned OFF IV vasopressor. Milrinone dose rate reduced. IABP removed. Recurrent AF with RVR with PVCs- started on dofetilide. MVO2 consistently 70+.        PAST MEDICAL & SURGICAL HISTORY:  ETOH abuse    HTN (hypertension)    Macular degeneration        Review of Systems:  CONSTITUTIONAL: No fever, chills, or fatigue  EYES: No eye pain, visual disturbances, or discharge  ENMT:  No difficulty hearing, tinnitus, vertigo; No sinus or throat pain  NECK: No pain or stiffness  RESPIRATORY: No cough, wheezing, chills or hemoptysis; No shortness of breath  CARDIOVASCULAR: No chest pain, palpitations, dizziness, or leg swelling  GASTROINTESTINAL: No abdominal or epigastric pain. No nausea, vomiting, or hematemesis; No diarrhea or constipation. No melena or hematochezia.  GENITOURINARY: No dysuria, frequency, hematuria, or incontinence  NEUROLOGICAL: No headaches, memory loss, loss of strength, numbness, or tremors  SKIN: No itching, burning, rashes, or lesions   MUSCULOSKELETAL: No joint pain or swelling; No muscle, back, or extremity pain  PSYCHIATRIC: No depression, anxiety, mood swings, or difficulty sleeping      Medications:    dofetilide 500 MICROGram(s) Oral every 12 hours  milrinone Infusion 0.125 MICROgram(s)/kG/Min IV Continuous <Continuous>  norepinephrine Infusion 0.05 MICROgram(s)/kG/Min IV Continuous <Continuous>  chlordiazePOXIDE 25 milliGRAM(s) Oral once  aspirin  chewable 81 milliGRAM(s) Oral daily  heparin  Infusion. 1000 Unit(s)/Hr IV Continuous <Continuous>  folic acid 1 milliGRAM(s) Oral daily  multivitamin 1 Tablet(s) Oral daily  thiamine 100 milliGRAM(s) Oral daily  chlorhexidine 2% Cloths 1 Application(s) Topical daily      ICU Vital Signs Last 24 Hrs  T(C): 36.4 (04 Feb 2022 23:25), Max: 36.9 (04 Feb 2022 04:00)  T(F): 97.5 (04 Feb 2022 23:25), Max: 98.4 (04 Feb 2022 04:00)  HR: 81 (05 Feb 2022 00:00) (70 - 124)  BP: 94/70 (05 Feb 2022 00:00) (82/58 - 120/100)  BP(mean): 79 (05 Feb 2022 00:00) (67 - 107)  ABP: 93/73 (04 Feb 2022 13:00) (87/57 - 106/75)  ABP(mean): 81 (04 Feb 2022 13:00) (71 - 89)  RR: 28 (05 Feb 2022 00:00) (13 - 28)  SpO2: 91% (05 Feb 2022 00:00) (91% - 100%)          I&O's Detail    03 Feb 2022 07:01  -  04 Feb 2022 07:00  --------------------------------------------------------  IN:    Heparin: 240 mL    IV PiggyBack: 100 mL    Milrinone: 14.5 mL    Milrinone: 91.5 mL    Norepinephrine: 82.2 mL  Total IN: 528.2 mL    OUT:    Indwelling Catheter - Urethral (mL): 5420 mL  Total OUT: 5420 mL    Total NET: -4891.8 mL      04 Feb 2022 07:01  -  05 Feb 2022 00:33  --------------------------------------------------------  IN:    Heparin: 40 mL    Heparin Infusion: 60 mL    IV PiggyBack: 50 mL    IV PiggyBack: 250 mL    Milrinone: 46.4 mL    Norepinephrine: 62.6 mL    Oral Fluid: 240 mL  Total IN: 749 mL    OUT:    Indwelling Catheter - Urethral (mL): 1376 mL  Total OUT: 1376 mL    Total NET: -627 mL            LABS:                        16.0   6.57  )-----------( 68       ( 04 Feb 2022 04:36 )             45.1     02-04    136  |  93<L>  |  29.9<H>  ----------------------------<  99  3.5   |  29.0  |  0.72    Ca    8.0<L>      04 Feb 2022 04:36  Phos  2.7     02-04  Mg     1.6     02-04    TPro  5.4<L>  /  Alb  3.3  /  TBili  5.5<H>  /  DBili  x   /  AST  1932<H>  /  ALT  1548<H>  /  AlkPhos  124<H>  02-04      CARDIAC MARKERS ( 04 Feb 2022 04:36 )  x     / 0.26 ng/mL / x     / x     / x      CARDIAC MARKERS ( 03 Feb 2022 10:03 )  x     / 0.27 ng/mL / x     / x     / x          CAPILLARY BLOOD GLUCOSE        PT/INR - ( 04 Feb 2022 12:43 )   PT: 22.1 sec;   INR: 1.97 ratio         PTT - ( 04 Feb 2022 12:43 )  PTT:39.6 sec    CULTURES:        Physical Examination:    General: No acute distress.  Alert, oriented, interactive, nonfocal    HEENT: Pupils equal, reactive to light.  Symmetric.    PULM: Clear to auscultation bilaterally, no significant sputum production    CVS: tachycardic, irreg irreg rhythm, no murmurs, rubs, or gallops    ABD: Soft, nondistended, nontender, normoactive bowel sounds, no masses    EXT: No edema, nontender, right groin IABP insertion site c/d/i, no oozing or palpable hematoma    SKIN: Warm and well perfused, no rashes noted.    NEURO: awake and alert, no focal deficits        RADIOLOGY:     < from: Xray Chest 1 View- PORTABLE-Urgent (Xray Chest 1 View- PORTABLE-Urgent .) (02.02.22 @ 21:44) >  FINDINGS: Right IJ Nunez-Presley catheter with the tip projected over the   proximal right pulmonary artery, new. Mild cardiomegaly and normal   pulmonary vasculature with no lobar consolidation.    Patchy right perihilar alveolar infiltrates, new. Pleural effusions   evident on CT.    IABP marker projected over the AP window.    IMPRESSION:  Right IJ line Nunez-Presley catheter with the tip projected over the proximal   pulmonary artery, new.    IABP marker at the AP window.    Right perihilar alveolar infiltrates, new.    Pleural effusions evident on CT          CRITICAL CARE TIME SPENT: 35 mins  Time spent evaluating/treating patient with medical issues that pose a high risk for life threatening deterioration and/or end-organ damage, reviewing data/labs/imaging, discussing case with multidisciplinary team, discussing plan/goals of care with patient/family. Non-inclusive of procedure time.

## 2022-02-05 NOTE — PROGRESS NOTE ADULT - ASSESSMENT
82 y/o M with a h/o EtOH abuse, HTN, and macular degeneration, with cardiogenic shock, acute systolic biventricular heart failure, JOON, shock liver, AF with RVR.    Cardiogenic shock believed to be primarily nonischemic (EtOH related?). 70% LAD lesion not stented as degree of cardiomyopathy thought to be out of proportion to amount of stenosis.    - weaned off norepinephrine infusion, BP is soft, low threshold to restart to maintain a MAP > 65  - milrinone weaned to 0.125mcg/kg/min  - IABP removed  - serial MVO2s 70+ and lactates within normal range  - PA pressures improving  - diuresis on hold for now, consider starting PO furosemide today  - goal CVP < 10  - not hemodynamically stable yet for GDMT with BB, ACE-I, etc..  - recurrent AF with RVR overnight, started on dofetilide 500mg Q 12 hours as per EP, will obtain EKG 3 hours after first dose and adjust as necessary based on QTc   - maintain therapeutic a/c with heparin gtt  - JOON secondary to ischemic ATN, renal indices continue to improve, UOP adequate  - trend BUN/Cr, monitor lytes, no indication for urgent HD at this time  - LFTs still severely elevated but downtrending with improved R-->L flow and restored perfusion, avoid hepatotoxic agents

## 2022-02-05 NOTE — PROGRESS NOTE ADULT - ASSESSMENT
81 year old male patient with a history of HTN, macular degeneration, and daily ETOH consumption who presented to [facility] with acute systolic heart failure (non-ischemic), EF 10-15%, and new onset atrial flutter/fibrillation (CHADSVASC: 5) with RVR. Underwent cardiac catheterization which demonstrated  of EF 10-15% and 70% mid LAD occlusion, Cardiac index 1.8. IABP was placed, milrinone initiated and he was transferred to [facility]  for further management of cardiogenic shock. He subsequently spontaneously converted to sinus rhythm, IABP was ultimately d/c'd and he is now weaned off of pressor support an milrinone. Continues to have frequent salvoes of non-sustained AT,. and NSVTs.      1. Atrial flutter  - Continue heparin for now. Anticipate transition to Eliquis in the near term  - possible CTI ablation prior to d/c as rate control is not always feasible for AFL - this can be performed on uninterrupted Eliquis.   - Dofetilide d/c'd due to QTc >500ms  - confirmed w/ manual measurement. Would not trial at lower dose given significant prolongation  - Initiate BB once able, would start with Lopressor 12.5mg q 6 hours and titrate up as tolerated.     2. AFIB  - Counseled on need to decrease alcohol us   - CHADS-VASC = 5 (age x 2, HTN, HF, CAD). Long term a/c as above.   - BB as above    3. Frequent ectopy  - expect improvement now that pt is off pressors/milrinone.   - BB will also be beneficial  - no need for acute intervention    4. Bradycardia and intermittent/occasional drop P waves.  - most c/w non conducted PACs  - No evidence of AVB/high risk pauses  - No current indication for pacing support    5. HF/cardiogenic shock  - off IABP, milrinone and Levo.   - Cardiology, HF and ICU managing  - initiate GDMT - start w/ BB as above.   - Will consider LifeVest prior to DC and r/a EF in 3 months after rhythm control, alcohol abstinence and GDMT before advising for ICD    Above d/w Dr. Liang and MICU team.

## 2022-02-05 NOTE — PROGRESS NOTE ADULT - SUBJECTIVE AND OBJECTIVE BOX
[facility]                                                               Office: [address] [address]                                                              Telephone: [phone]. Fax:[phone]                                                                             PROGRESS NOTE  Reason for follow up: acute decompensated heart failure - Post IAPB  Update: No Complaints this am "feeling well"   Covid Status: Negative       Review of symptoms:   All review of systems negative unless indicated otherwise above.     	  Vitals:  T(C): 36.6 (02-05-22 @ 11:06), Max: 37.4 (02-05-22 @ 04:00)  HR: 71 (02-05-22 @ 13:00) (66 - 115)  BP: 104/72 (02-05-22 @ 13:00) (82/58 - 120/100)  RR: 20 (02-05-22 @ 13:00) (13 - 31)  SpO2: 97% (02-05-22 @ 13:00) (90% - 100%)  Wt(kg): --  I&O's Summary    04 Feb 2022 07:01  -  05 Feb 2022 07:00  --------------------------------------------------------  IN: 839.3 mL / OUT: 1781 mL / NET: -941.7 mL    05 Feb 2022 07:01  -  05 Feb 2022 14:20  --------------------------------------------------------  IN: 172.9 mL / OUT: 775 mL / NET: -602.1 mL      Weight (kg): 76.9 (02-02 @ 19:05)      PHYSICAL EXAM:  Appearance: NAD  Neurologic: A&Ox3   Cardiovascular:  Irregular rate no RMG   Respiratory: Lungs clear to auscultation	  Psychiatry: Mood & affect appropriate  Gastrointestinal:  Soft, Non-tender, + BS, no bruits	  Extremities: Normal range of motion, No clubbing, cyanosis or edema  Vascular: Peripheral pulses palpable 2+ bilaterally  Skin: No Erythema, No ecchymoses, No cyanosis, No rashes  Right Groin site benign         CURRENT MEDICATIONS:  dofetilide 500 MICROGram(s) Oral every 12 hours  metoprolol tartrate 12.5 milliGRAM(s) Oral every 6 hours  milrinone Infusion 0.125 MICROgram(s)/kG/Min IV Continuous <Continuous>  aspirin  chewable  chlorhexidine 2% Cloths  folic acid  heparin  Infusion.  multivitamin  thiamine        LABS:	 	  CARDIAC MARKERS ( 04 Feb 2022 04:36 )  x     / 0.26 ng/mL / x     / x     / x      p-BNP 04 Feb 2022 04:36: 1356 pg/mL, CARDIAC MARKERS ( 03 Feb 2022 10:03 )  x     / 0.27 ng/mL / x     / x     / x      p-BNP 03 Feb 2022 10:03: 2970 pg/mL                          14.6   4.29  )-----------( 57       ( 05 Feb 2022 05:07 )             42.7     02-05    136  |  96<L>  |  19.1  ----------------------------<  136<H>  3.4<L>   |  31.0<H>  |  0.52    Ca    7.9<L>      05 Feb 2022 05:07  Phos  2.8     02-05  Mg     1.6     02-05    TPro  5.0<L>  /  Alb  3.1<L>  /  TBili  5.5<H>  /  DBili  x   /  AST  660<H>  /  ALT  936<H>  /  AlkPhos  120  02-05    proBNP: Serum Pro-Brain Natriuretic Peptide: 1356 pg/mL (02-04 @ 04:36)  Serum Pro-Brain Natriuretic Peptide: 2970 pg/mL (02-03 @ 10:03)    TSH: Thyroid Stimulating Hormone, Serum: 1.40 uIU/mL        TELEMETRY: afib with multi focal PVC   	      80 y/o M with a h/o EtOH abuse, HTN, and macular degeneration, with cardiogenic shock, acute systolic biventricular heart failure, JOON, shock liver, AF with RVR.    Cardiogenic shock believed to be primarily nonischemic (EtOH related?). 70% LAD lesion not stented as degree of cardiomyopathy thought to be out of proportion to amount of stenosis.    - weaned off norepinephrine infusion, BP is soft, low threshold to restart to maintain a MAP > 65  - milrinone weaned to 0.125mcg/kg/min  - IABP removed  - not hemodynamically stable yet for GDMT with BB, ACE-I.   - recurrent AF with RVR overnight, started on dofetilide 500mg Q 12 hours as per EP due to prolonged QTc was discontinued - in exchange for Metoprolol Q6    - maintain therapeutic a/c with heparin gtt

## 2022-02-06 LAB
ALBUMIN SERPL ELPH-MCNC: 3.2 G/DL — LOW (ref 3.3–5.2)
ALBUMIN SERPL ELPH-MCNC: 3.3 G/DL — SIGNIFICANT CHANGE UP (ref 3.3–5.2)
ALP SERPL-CCNC: 128 U/L — HIGH (ref 40–120)
ALP SERPL-CCNC: 137 U/L — HIGH (ref 40–120)
ALT FLD-CCNC: 679 U/L — HIGH
ALT FLD-CCNC: 832 U/L — HIGH
ANION GAP SERPL CALC-SCNC: 13 MMOL/L — SIGNIFICANT CHANGE UP (ref 5–17)
ANION GAP SERPL CALC-SCNC: 13 MMOL/L — SIGNIFICANT CHANGE UP (ref 5–17)
APTT BLD: 73.2 SEC — HIGH (ref 27.5–35.5)
AST SERPL-CCNC: 306 U/L — HIGH
AST SERPL-CCNC: 410 U/L — HIGH
BILIRUB SERPL-MCNC: 4.8 MG/DL — HIGH (ref 0.4–2)
BILIRUB SERPL-MCNC: 5.1 MG/DL — HIGH (ref 0.4–2)
BUN SERPL-MCNC: 22.9 MG/DL — HIGH (ref 8–20)
BUN SERPL-MCNC: 24.9 MG/DL — HIGH (ref 8–20)
CALCIUM SERPL-MCNC: 8.3 MG/DL — LOW (ref 8.6–10.2)
CALCIUM SERPL-MCNC: 8.8 MG/DL — SIGNIFICANT CHANGE UP (ref 8.6–10.2)
CHLORIDE SERPL-SCNC: 91 MMOL/L — LOW (ref 98–107)
CHLORIDE SERPL-SCNC: 94 MMOL/L — LOW (ref 98–107)
CO2 SERPL-SCNC: 24 MMOL/L — SIGNIFICANT CHANGE UP (ref 22–29)
CO2 SERPL-SCNC: 28 MMOL/L — SIGNIFICANT CHANGE UP (ref 22–29)
CREAT SERPL-MCNC: 0.8 MG/DL — SIGNIFICANT CHANGE UP (ref 0.5–1.3)
CREAT SERPL-MCNC: 1.07 MG/DL — SIGNIFICANT CHANGE UP (ref 0.5–1.3)
GAS PNL BLDA: SIGNIFICANT CHANGE UP
GLUCOSE SERPL-MCNC: 102 MG/DL — HIGH (ref 70–99)
GLUCOSE SERPL-MCNC: 111 MG/DL — HIGH (ref 70–99)
HCT VFR BLD CALC: 47.3 % — SIGNIFICANT CHANGE UP (ref 39–50)
HGB BLD-MCNC: 16 G/DL — SIGNIFICANT CHANGE UP (ref 13–17)
LACTATE SERPL-SCNC: 2.2 MMOL/L — HIGH (ref 0.5–2)
MAGNESIUM SERPL-MCNC: 2 MG/DL — SIGNIFICANT CHANGE UP (ref 1.6–2.6)
MAGNESIUM SERPL-MCNC: 2.3 MG/DL — SIGNIFICANT CHANGE UP (ref 1.6–2.6)
MCHC RBC-ENTMCNC: 31.3 PG — SIGNIFICANT CHANGE UP (ref 27–34)
MCHC RBC-ENTMCNC: 33.8 GM/DL — SIGNIFICANT CHANGE UP (ref 32–36)
MCV RBC AUTO: 92.4 FL — SIGNIFICANT CHANGE UP (ref 80–100)
PHOSPHATE SERPL-MCNC: 4 MG/DL — SIGNIFICANT CHANGE UP (ref 2.4–4.7)
PLATELET # BLD AUTO: 63 K/UL — LOW (ref 150–400)
POTASSIUM SERPL-MCNC: 4.5 MMOL/L — SIGNIFICANT CHANGE UP (ref 3.5–5.3)
POTASSIUM SERPL-MCNC: 4.9 MMOL/L — SIGNIFICANT CHANGE UP (ref 3.5–5.3)
POTASSIUM SERPL-SCNC: 4.5 MMOL/L — SIGNIFICANT CHANGE UP (ref 3.5–5.3)
POTASSIUM SERPL-SCNC: 4.9 MMOL/L — SIGNIFICANT CHANGE UP (ref 3.5–5.3)
PROT SERPL-MCNC: 5.5 G/DL — LOW (ref 6.6–8.7)
PROT SERPL-MCNC: 5.8 G/DL — LOW (ref 6.6–8.7)
RBC # BLD: 5.12 M/UL — SIGNIFICANT CHANGE UP (ref 4.2–5.8)
RBC # FLD: 12.6 % — SIGNIFICANT CHANGE UP (ref 10.3–14.5)
SODIUM SERPL-SCNC: 131 MMOL/L — LOW (ref 135–145)
SODIUM SERPL-SCNC: 132 MMOL/L — LOW (ref 135–145)
WBC # BLD: 5.64 K/UL — SIGNIFICANT CHANGE UP (ref 3.8–10.5)
WBC # FLD AUTO: 5.64 K/UL — SIGNIFICANT CHANGE UP (ref 3.8–10.5)

## 2022-02-06 PROCEDURE — 99231 SBSQ HOSP IP/OBS SF/LOW 25: CPT

## 2022-02-06 PROCEDURE — 71045 X-RAY EXAM CHEST 1 VIEW: CPT | Mod: 26

## 2022-02-06 PROCEDURE — 99291 CRITICAL CARE FIRST HOUR: CPT

## 2022-02-06 PROCEDURE — 93010 ELECTROCARDIOGRAM REPORT: CPT

## 2022-02-06 RX ORDER — FUROSEMIDE 40 MG
40 TABLET ORAL DAILY
Refills: 0 | Status: DISCONTINUED | OUTPATIENT
Start: 2022-02-06 | End: 2022-02-07

## 2022-02-06 RX ORDER — METOPROLOL TARTRATE 50 MG
25 TABLET ORAL
Refills: 0 | Status: DISCONTINUED | OUTPATIENT
Start: 2022-02-06 | End: 2022-02-07

## 2022-02-06 RX ORDER — METOPROLOL TARTRATE 50 MG
12.5 TABLET ORAL ONCE
Refills: 0 | Status: COMPLETED | OUTPATIENT
Start: 2022-02-06 | End: 2022-02-06

## 2022-02-06 RX ORDER — MAGNESIUM SULFATE 500 MG/ML
1 VIAL (ML) INJECTION ONCE
Refills: 0 | Status: COMPLETED | OUTPATIENT
Start: 2022-02-06 | End: 2022-02-06

## 2022-02-06 RX ORDER — CALCIUM GLUCONATE 100 MG/ML
1 VIAL (ML) INTRAVENOUS EVERY 6 HOURS
Refills: 0 | Status: COMPLETED | OUTPATIENT
Start: 2022-02-06 | End: 2022-02-08

## 2022-02-06 RX ORDER — ENOXAPARIN SODIUM 100 MG/ML
80 INJECTION SUBCUTANEOUS
Refills: 0 | Status: DISCONTINUED | OUTPATIENT
Start: 2022-02-06 | End: 2022-02-09

## 2022-02-06 RX ORDER — APIXABAN 2.5 MG/1
5 TABLET, FILM COATED ORAL
Refills: 0 | Status: DISCONTINUED | OUTPATIENT
Start: 2022-02-06 | End: 2022-02-06

## 2022-02-06 RX ADMIN — Medication 100 MILLIGRAM(S): at 12:16

## 2022-02-06 RX ADMIN — Medication 100 GRAM(S): at 23:48

## 2022-02-06 RX ADMIN — Medication 81 MILLIGRAM(S): at 12:14

## 2022-02-06 RX ADMIN — CHLORHEXIDINE GLUCONATE 1 APPLICATION(S): 213 SOLUTION TOPICAL at 12:10

## 2022-02-06 RX ADMIN — Medication 40 MILLIGRAM(S): at 08:39

## 2022-02-06 RX ADMIN — Medication 12.5 MILLIGRAM(S): at 17:51

## 2022-02-06 RX ADMIN — Medication 100 GRAM(S): at 17:49

## 2022-02-06 RX ADMIN — ENOXAPARIN SODIUM 80 MILLIGRAM(S): 100 INJECTION SUBCUTANEOUS at 17:50

## 2022-02-06 RX ADMIN — Medication 12.5 MILLIGRAM(S): at 05:11

## 2022-02-06 RX ADMIN — Medication 100 GRAM(S): at 12:18

## 2022-02-06 RX ADMIN — Medication 1 MILLIGRAM(S): at 12:23

## 2022-02-06 RX ADMIN — Medication 1 TABLET(S): at 12:16

## 2022-02-06 RX ADMIN — Medication 25 MILLIGRAM(S): at 23:48

## 2022-02-06 NOTE — PROGRESS NOTE ADULT - SUBJECTIVE AND OBJECTIVE BOX
HPI: 80 yo male presented as a transfer from [place] after experiencing chest pressure and shortness of breath, received cardiac cath, swan tejas and IABP placement, placed on milirinone gtt with CI of 1.8.  Patient also had recurrent RVR, and placed on dofetlide which was discontinued 2/2 QTC prolongation. S/P swan and IABP removal; being managed with metoprolol, aldactone and lasix.    24hr:    HPI: 82 yo male PMH EtOH, HTN, macular degeneration, presented as a transfer from [facility] after experiencing chest pressure and shortness of breath, received cardiac cath, swan tejas and IABP placement, placed on milirinone gtt with CI of 1.8.  Patient also had recurrent RVR, and placed on dofetlide which was discontinued 2/2 QTC prolongation. S/P swan and IABP removal; being managed with metoprolol, aldactone and lasix.    24hr: Breathing improved, liver enzymes trending downward, maintaining blood pressure off    HPI: 80 yo male PMH EtOH, HTN, macular degeneration, presented as a transfer from [facility] after experiencing chest pressure and shortness of breath, received cardiac cath, swan tejas and IABP placement, placed on milirinone gtt with CI of 1.8.  Patient also had recurrent RVR, and placed on dofetlide which was discontinued 2/2 QTC prolongation. S/P swan and IABP removal; being managed with metoprolol, aldactone and lasix.    24hr: Breathing improved, on room air, liver enzymes trending downward, maintaining blood pressure off inotropic support, heparin gtt infusing.    PMH:EtOH  HTN  macular degeneration     HPI: 80 yo male PMH EtOH, HTN, macular degeneration, presented as a transfer from [facility] after experiencing chest pressure and shortness of breath, received cardiac cath, swan tejas and IABP placement, placed on milirinone gtt with CI of 1.8.  Patient also had recurrent RVR, and placed on dofetlide which was discontinued 2/2 QTC prolongation. S/P swan and IABP removal; being managed with metoprolol, aldactone and lasix.    24hr: Breathing improved, on room air, liver enzymes trending downward, maintaining blood pressure off inotropic support, heparin gtt infusing.    PMH:EtOH  HTN  macular degeneration  CAD   Afib   transaminitis    Social Hx:   endorses 5 drinks per day    Allergies: NKA    ROS:  General: afebrile, denies pain  cardiac: denies chest pain, no dizziness/syncope  respiratory: No SOB at this time, reports sleeping in chair at home and states his breathing is much better now than it was at home  GI: No NVD, appetite good, no bloody stool, normal bowel pattern  : no urgency, frequency or burning  extremities: reports improvement in swelling to all four extremities    ICU Vital Signs Last 24 Hrs  T(C): 36.4 (06 Feb 2022 11:33), Max: 36.4 (06 Feb 2022 11:33)  T(F): 97.5 (06 Feb 2022 11:33), Max: 97.5 (06 Feb 2022 11:33)  HR: 64 (06 Feb 2022 12:00) (57 - 98)  BP: 108/78 (06 Feb 2022 10:00) (83/60 - 139/88)  BP(mean): 87 (06 Feb 2022 10:00) (62 - 123)  ABP: --  ABP(mean): --  RR: 26 (06 Feb 2022 09:00) (12 - 36)  SpO2: 96% (06 Feb 2022 12:00) (85% - 100%)                         16.0   5.64  )-----------( 63       ( 06 Feb 2022 04:52 )             47.3     131<L>  |  94<L>  |  22.9<H>  ----------------------------<  111<H>  4.5   |  24.0  |  0.80    Ca    8.3<L>      06 Feb 2022 04:52  Phos  4.0     02-06  Mg     2.3     02-06    TPro  5.8<L>  /  Alb  3.3  /  TBili  5.1<H>  /  DBili  x   /  AST  410<H>  /  ALT  832<H>  /  AlkPhos  137<H>  02-06    ABG - ( 06 Feb 2022 09:08 )  pH, Arterial: 7.560 pH, Blood: x     /  pCO2: 32    /  pO2: 85    / HCO3: 29    / Base Excess: 6.5   /  SaO2: 98.7      PT/INR - ( 05 Feb 2022 05:07 )   PT: 19.8 sec;   INR: 1.75 ratio      PTT - ( 06 Feb 2022 04:52 )  PTT:73.2 sec    Physical Exam:  General: well nourished and well developed   Neuro: Alert and orientedx4, pleasant, with clear appropriate speech   Cardiac:S1, S2, with 2/6 early systolic murmur noted, Afib on monitor with frequent PVCs.  Respiratory: Breathing easy on room air with clear diminished breath sounds all lobes.   GI: Abdomen distended firm, nontender, with BS+ in all four quadrants  : garza noted with dark rick clear urine  extremities: trace edema to BUEs 1+ edema to BLEs.  1+ radial and pedal pulses.

## 2022-02-06 NOTE — PROGRESS NOTE ADULT - ASSESSMENT
Remains in SR off Tikosyn  For Atrial flutter ablation in future  Severe LV Dysfunction, PVCs, couplets, triplets Remains in SR off Tikosyn  For Atrial flutter/Fib  ablation in future  Severe LV Dysfunction, PVCs, couplets, triplets - optimize medical therapy and reassessment of EF. May need ICD in future.  Remains in SR off Tikosyn. QTc acceptable for rate.   For Atrial flutter/Fib  ablation in future  Severe LV Dysfunction, PVCs, couplets, triplets - optimize medical therapy and reassessment of EF. Continue on BB.  May need ICD in future.

## 2022-02-06 NOTE — PROGRESS NOTE ADULT - SUBJECTIVE AND OBJECTIVE BOX
Subjective: NAEO. Pt. OOB to chair this am. Reports feeling well. Denies CP, palpitations, SOB.     Objective: Bradycardia on tele this am. BP stable off pressors/inotropic support.     Medications:  apixaban 5 milliGRAM(s) Oral two times a day  aspirin  chewable 81 milliGRAM(s) Oral daily  calcium gluconate IVPB 1 Gram(s) IV Intermittent every 6 hours  chlorhexidine 2% Cloths 1 Application(s) Topical daily  folic acid 1 milliGRAM(s) Oral daily  furosemide   Injectable 40 milliGRAM(s) IV Push daily  metoprolol tartrate 12.5 milliGRAM(s) Oral every six hours  multivitamin 1 Tablet(s) Oral daily  thiamine 100 milliGRAM(s) Oral daily    Vital Signs Last 24 Hours  T(C): 36.2 (02-06-22 @ 08:04), Max: 36.6 (02-05-22 @ 11:06)  HR: 77 (02-06-22 @ 09:00) (57 - 98)  BP: 108/72 (02-06-22 @ 09:00) (83/60 - 139/88)  RR: 26 (02-06-22 @ 09:00) (12 - 36)  SpO2: 94% (02-06-22 @ 09:00) (87% - 100%)    I&O's Summary    05 Feb 2022 07:01  -  06 Feb 2022 07:00  --------------------------------------------------------  IN: 332.9 mL / OUT: 1460 mL / NET: -1127.1 mL    Tele: SR/SB, PVCs, bigeminy, NSVT    Physical Exam:  General: OOB in recliner. In no distress, appears comfortable   HEENT: EOMs intact.  Neck: Neck supple. JVP difficult to assess-gauze bandage in place.  Chest: Clear to auscultation bilaterally  CV: Normal S1 and S2. No murmurs, rub, or gallops.   PV: Warm peripherally, no edema observed, 2+ radial pulses b/l, 2+ DP pulses b/l.  Abdomen: Soft, non-distended  Skin: warm, dry. Ecchymosis noted on right neck/ posterior shoulder. Right groin site without drainage, no hematoma.  Neurology: Alert and oriented times three. Sensation intact  Psych: Affect normal    Labs:                        16.0   5.64  )-----------( 63       ( 06 Feb 2022 04:52 )             47.3     02-06    131<L>  |  94<L>  |  22.9<H>  ----------------------------<  111<H>  4.5   |  24.0  |  0.80    Ca    8.3<L>      06 Feb 2022 04:52  Phos  4.0     02-06  Mg     2.3     02-06    TPro  5.8<L>  /  Alb  3.3  /  TBili  5.1<H>  /  DBili  x   /  AST  410<H>  /  ALT  832<H>  /  AlkPhos  137<H>  02-06    PT/INR - ( 05 Feb 2022 05:07 )   PT: 19.8 sec;   INR: 1.75 ratio         PTT - ( 06 Feb 2022 04:52 )  PTT:73.2 sec    Serum Pro-Brain Natriuretic Peptide: 1356 pg/mL (02-04 @ 04:36)  Serum Pro-Brain Natriuretic Peptide: 2970 pg/mL (02-03 @ 10:03)    Lactate, Blood: 2.2 mmol/L (02-06 @ 04:52)  Lactate, Blood: 2.1 mmol/L (02-05 @ 17:05)  Lactate, Blood: 1.2 mmol/L (02-05 @ 05:07)  Lactate, Blood: 1.3 mmol/L (02-04 @ 04:36)  Lactate, Blood: 1.4 mmol/L (02-03 @ 19:31)    TTE Echo Complete w/o Contrast w/ Doppler (02.03.22 @ 11:01)        1. Technically difficult study.   2. Left ventricular ejection fraction, by visual estimation, is 20 to   25%.   3. Severely decreased global left ventricular systolic function.   4. Spectral Doppler shows impaired relaxation pattern of left   ventricular myocardial filling (Grade I diastolic dysfunction).   5. Mildly enlarged right ventricle.   6. The left atrium is normal in size.   7. Mildmitral annular calcification.   8. Mild to moderate mitral valve regurgitation.   9. The mitral valve leaflets are tethered which is due to reduced   systolic function and elevated LVDP.  10. Mild tricuspid regurgitation.  11. Estimated pulmonary artery systolic pressure is 42.0 mmHg assuming a   right atrial pressure of 15 mmHg, which is consistent with mild pulmonary   hypertension.  12. Mild aortic regurgitation.  13. Sclerotic aortic valve with normal opening.  14. Trivial pericardial effusion.  15. Endocardial visualization was enhanced with intravenous echo contrast.  16. Pulse wave Doppler in the descending aorta is consistent with IABP in   place.  17. There are no prior studies on this patient for comparison purposes.    < end of copied text >

## 2022-02-06 NOTE — PROGRESS NOTE ADULT - SUBJECTIVE AND OBJECTIVE BOX
appears comfortable  Telemetry - SR, PVCs, ventricular couplets, triplets       MEDICATIONS  (STANDING):  apixaban 5 milliGRAM(s) Oral two times a day  aspirin  chewable 81 milliGRAM(s) Oral daily  calcium gluconate IVPB 1 Gram(s) IV Intermittent every 6 hours  chlorhexidine 2% Cloths 1 Application(s) Topical daily  folic acid 1 milliGRAM(s) Oral daily  furosemide   Injectable 40 milliGRAM(s) IV Push daily  metoprolol tartrate 25 milliGRAM(s) Oral two times a day  multivitamin 1 Tablet(s) Oral daily  thiamine 100 milliGRAM(s) Oral daily    MEDICATIONS  (PRN):      Allergies    No Known Allergies    Intolerances      PAST MEDICAL & SURGICAL HISTORY:  ETOH abuse    HTN (hypertension)    Macular degeneration        Vital Signs Last 24 Hrs  T(C): 36.4 (06 Feb 2022 11:33), Max: 36.4 (06 Feb 2022 11:33)  T(F): 97.5 (06 Feb 2022 11:33), Max: 97.5 (06 Feb 2022 11:33)  HR: 64 (06 Feb 2022 12:00) (57 - 98)  BP: 108/78 (06 Feb 2022 10:00) (83/60 - 139/88)  BP(mean): 87 (06 Feb 2022 10:00) (62 - 123)  RR: 26 (06 Feb 2022 09:00) (12 - 36)  SpO2: 96% (06 Feb 2022 12:00) (85% - 100%)    Physical Exam:  Cardiovascular: +S1S2 RRR  Pulmonary: CTA b/l, unlabored  Abd: soft NTND +BS  Extremities: no pedal edema, +distal pulses b/l  LABS:                        16.0   5.64  )-----------( 63       ( 06 Feb 2022 04:52 )             47.3     02-06    131<L>  |  94<L>  |  22.9<H>  ----------------------------<  111<H>  4.5   |  24.0  |  0.80    Ca    8.3<L>      06 Feb 2022 04:52  Phos  4.0     02-06  Mg     2.3     02-06    TPro  5.8<L>  /  Alb  3.3  /  TBili  5.1<H>  /  DBili  x   /  AST  410<H>  /  ALT  832<H>  /  AlkPhos  137<H>  02-06    PT/INR - ( 05 Feb 2022 05:07 )   PT: 19.8 sec;   INR: 1.75 ratio         PTT - ( 06 Feb 2022 04:52 )  PTT:73.2 sec

## 2022-02-06 NOTE — PROGRESS NOTE ADULT - ASSESSMENT
Pt is a 80 yo male  Pt is a 82 yo male presenting with SOB 2/2 to cardiogenic shock and jaundice from shock liver.   Pt is a 82 yo male presenting with SOB 2/2 to cardiogenic shock and jaundice from shock liver.       Pt is a 82 yo male presenting with SOB 2/2 to cardiogenic shock and jaundice from shock liver.      Plan:   Neuro: patient awake a+ox4  cardiac: cardiogenic shock, off milrinone, IABP out and maintaining blood pressure, shock state improving, afib on monitor with NSVT, continue to monitor rhythm and manage rate with metoprolol  Resp: breathing easy on room air, continue aldactone for diuresis  GI/Hepatology: tolerating PO well, shock liver however AST. ALT and total bilirubin improving, continue to monitor and avoid hepatotoxic medications.  : garza in place continue to monitor strict I/O, monitor BUN and creat  extremities: 1+ edema to BLEs, continue aldactone  hematology: thrombocytopenia 2/2 transaminitis, patient on DOACs; monitor for s/s bleeding, check coagulation,

## 2022-02-06 NOTE — PROGRESS NOTE ADULT - PROBLEM SELECTOR PLAN 4
Likely secondary to shock liver   TBili peaked at 5.5, starting to improve  LFTs continue to improve

## 2022-02-06 NOTE — PROGRESS NOTE ADULT - SUBJECTIVE AND OBJECTIVE BOX
81y  Male  No Known Allergies    Patient is a 81y old  Male who presents with a chief complaint of Cardiogenic shock     HPI: 81 year old male PMHx of EtOH abuse, HTN, and macular degeneration who presented to [facility] ER on 2/1 c/o weakness, dyspnea on exertion, intermittent chest pain, and decreased PO intake. Pt was instructed to go to the ER after outpatient EKG revealed a flutter w/ RVR. Underwent cardiac catheterization which was revealing of EF 10-15% and 70% mid LAD occlusion. IABP placed. Brush tejas catheter inserted. Cardiac index 1.8. Milrinone infusion initiated at 0.125mcg. Pt was transferred to [facility] for continuation of care where his course has been complicated by cardiogenic shock and with IV vasopressors added for hemodynamic support. As of tonight he has been weaned OFF IV vasopressor, intraaortic balloon pump has been removed , the swan tejas cath and cordis removed as well and the milrinone off as well. He had Recurrent AF with RVR, started on dofetilide which was discontinued for prolonged QTc and beta blocker started, aldactone and lasix are being used for diuresis.    PAST MEDICAL & SURGICAL HISTORY:  ETOH abuse  HTN (hypertension)  Macular degeneration    Vital Signs Last 24 Hrs  T(C): 36.6 (05 Feb 2022 11:06), Max: 37.4 (05 Feb 2022 04:00)  T(F): 97.9 (05 Feb 2022 11:06), Max: 99.3 (05 Feb 2022 04:00)  HR: 89 (05 Feb 2022 23:00) (65 - 115)  BP: 86/70 (05 Feb 2022 23:00) (86/70 - 139/88)  BP(mean): 76 (05 Feb 2022 23:00) (70 - 123)  RR: 26 (05 Feb 2022 23:00) (13 - 31)  SpO2: 95% (05 Feb 2022 23:00) (87% - 98%)    I&O's Summary  04 Feb 2022 07:01  -  05 Feb 2022 07:00  --------------------------------------------------------  IN: 839.3 mL / OUT: 1781 mL / NET: -941.7 mL    05 Feb 2022 07:01  -  06 Feb 2022 00:08  --------------------------------------------------------  IN: 252.9 mL / OUT: 1130 mL / NET: -877.1 mL      LABS  02-05  135  |  97<L>  |  17.4  ----------------------------<  145<H>  4.1   |  26.0  |  0.54  Ca    8.3<L>      05 Feb 2022 17:05  Phos  2.9     02-05  Mg     2.5     02-05  TPro  5.6<L>  /  Alb  3.3  /  TBili  5.5<H>  /  DBili  x   /  AST  542<H>  /  ALT  901<H>  /  AlkPhos  141<H>  02-05                       16.9   4.31  )-----------( 61       ( 05 Feb 2022 17:05 )             48.7     PT/INR - ( 05 Feb 2022 05:07 )   PT: 19.8 sec;   INR: 1.75 ratio    PTT - ( 05 Feb 2022 05:07 )  PTT:65.8 sec  CARDIAC MARKERS ( 04 Feb 2022 04:36 )  x     / 0.26 ng/mL / x     / x     / x        LIVER FUNCTIONS - ( 05 Feb 2022 17:05 )  Alb: 3.3 g/dL / Pro: 5.6 g/dL / ALK PHOS: 141 U/L / ALT: 901 U/L / AST: 542 U/L / GGT: x           MEDICATIONS  (STANDING):  aspirin  chewable 81 milliGRAM(s) Oral daily  chlorhexidine 2% Cloths 1 Application(s) Topical daily  folic acid 1 milliGRAM(s) Oral daily  heparin  Infusion. 1000 Unit(s)/Hr (10 mL/Hr) IV Continuous <Continuous>  metoprolol tartrate 12.5 milliGRAM(s) Oral every 6 hours  multivitamin 1 Tablet(s) Oral daily  thiamine 100 milliGRAM(s) Oral daily      REVIEW OF SYSTEMS:    CONSTITUTIONAL: No fever, weight loss, or fatigue  EYES: No eye pain, visual disturbances, or discharge  ENMT:  No difficulty hearing, tinnitus, vertigo; No sinus or throat pain  NECK: No pain or stiffness  BREASTS: No pain, masses, or nipple discharge  RESPIRATORY: No cough, wheezing, chills or hemoptysis; No shortness of breath  CARDIOVASCULAR: No chest pain, palpitations, dizziness, or leg swelling  GASTROINTESTINAL: No abdominal or epigastric pain. No nausea, vomiting, or hematemesis; No diarrhea or constipation. No melena or hematochezia.  GENITOURINARY: No dysuria, frequency, hematuria, or incontinence  NEUROLOGICAL: No headaches, memory loss, loss of strength, numbness, or tremors  SKIN: No itching, burning, rashes, or lesions   LYMPH NODES: No enlarged glands  ENDOCRINE: No heat or cold intolerance; No hair loss  MUSCULOSKELETAL: No joint pain or swelling; No muscle, back, or extremity pain  PSYCHIATRIC: No depression, anxiety, mood swings, or difficulty sleeping  HEME/LYMPH: No easy bruising, or bleeding gums  ALLERY AND IMMUNOLOGIC: No hives or eczema      Physicial Exam:     Constitutional: NAD, well-groomed, well-developed  HEENT: PERRLA, EOMI, no drainage or redness  Neck: No bruits; no thyromegaly or nodules,  No JVD  Back: Normal spine flexure, No CVA tenderness, No deformity or limitation of movement  Respiratory: Breath Sounds equal & clear to percussion & auscultation, no accessory muscle use  Cardiovascular: Regular rate & rhythm, normal S1, S2; no murmurs, gallops or rubs; no S3, S4  Gastrointestinal: Soft, non-tender, non distended no hepatosplenomegaly, normal bowel sounds  Extremities: No peripheral edema, No cyanosis, clubbing   Vascular: Equal and normal pulses: 2+ peripheral pulses throughout  Neurological: GCS:    A&O x 3; no sensory, motor  deficits, normal reflexes  Psychiatric: Normal mood, normal affect  Musculoskeletal: No joint pain, swelling or deformity; no limitation of movement  Skin: No rashes

## 2022-02-06 NOTE — PROGRESS NOTE ADULT - PROBLEM SELECTOR PLAN 1
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  Lactate 2.1 this morning. MAPs have remained above 65. Pt appears warm and well perfused and mentating well-monitor closely.  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 MAPs have remained above 65. Pt appears warm and well perfused and mentating well-monitor closely.  Recommend starting Aldactone 25mg daily. Hold further diuretics for now.  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 MAPs have remained above 65. Pt appears warm and well perfused and mentating well-monitor closely.  Recommend starting Aldactone 25mg daily. Hold further diuretics for now.  Low sodium and 1.5L FR diet

## 2022-02-06 NOTE — PROGRESS NOTE ADULT - ASSESSMENT
81 year old male admitted wt cardiogenic shock, acute systolic biventricular heart failure, JOON, shock liver, AF with RVR.    - continue to monitor off IV pressors / off IABP  - aldactone and lasix for diuresis.  - goal CVP < 10  - not hemodynamically stable yet for GDMT with BB, ACE-I, etc..  - recurrent AFib RVR overnight off dofetilide dut to QTc continue beta-blocker  - maintain therapeutic a/c with heparin gtt  - JOON secondary to ischemic ATN hold nephrotoxic meds, conservative hydration given cardiomyopathy trend urine output  -Follow up BUN/Creatinine/ electrolytes   - LFTs still elevated but downtrending , likely secondary to shock liver , avoid hepatotoxic agents  - off taper continue thiamine and multivitamin due to alcohol abuse hx   - continue to monitor overnight and consider downgrade in morning     Time spent: 30 mins assessing presenting problems of acute illness that poses high probability  end organ damage/dysfunction.  Medical decision making inculding plan of daily care, reviewing data, reviewing radiology, discussing with multidisciplinary team, non inclusive of procedures, discussing goals of care with patient/family

## 2022-02-06 NOTE — PROGRESS NOTE ADULT - ASSESSMENT
80 y/o male with PMH significant for HTN, EtOH abuse and glaucoma who presented to [facility] on 2/1 with c/o CP and ORELLANA. He was noted to be in atrial flutter w/ RVR. Pt. underwent RHC/LHC which revealed 70% mLAD lesion and LVEF 10-15% with low CI ~1.8. IABP and PA cath placed and patient transferred to [facility] 2/2 for further management of cardiogenic shock.    2/3/22: Pt hemodynamically stable, warm and well perfused with very good UOP response to diuretics. Would attempt to wean pressors and possibly IABP as well. EP consulted for aflutter/afib.    2/4/22: Low dose Levophed resumed overnight to maintain augmented BP . Negative L, diuretics held today. Plan to remove IABP.     Hemodynamics:    2/3/22: Milrinone 0.125mcg/kg/min HR 98, BP 92/62/81, augmented 100, CVP 7-9, PAP 38/15/22, TD CO/CI 6.3/3.2, BRAN CO/CI 3.7/1.9. MVO2 70% on 1:1 and 72% on 1:3.  2/4/22: Milrinone 0.125mcg/kg/min, Levo 0.05mcg/kg/min , BP 91/70/79, augmented 100, CVP 5, PAP 37/11/19, TD CI 2.2, MVO2 74.7 (IABP 1:1)

## 2022-02-06 NOTE — PROGRESS NOTE ADULT - ATTENDING COMMENTS
congestive heart failure . Severe biv Failure. alcoholic cardiomyopathy . underlying moderat to severe single vessel disease.  Prolong QTc with mupkltiple polymoprhic PVCs. telemetry reviewed.   discussed with EP. blake for lidocaine.    Unclear cause. last tikosyn dose few days ago. likely either underlying ischemia or severe CMP.  d./c lasix to avoid hypokalemia. recoeved significant potassium repltion  keep Mag > 2.5 and Potassium > 4.5   ECG now.will repeat EP evaluation if abnormal EKG.  patient will atleast need life vest on discharge.   CHF: aldactone. low dose beta-blocker   afib:  recommend lovenox and avoid oral AC for now as patietn may need advance therapies if decompensates again.  f./u LFTs.  will recommend ICU stay for 24 hr so that he is more stabilized.  Critical care cardiology.  I have personally provided critical care time > 45 mins excluding time spent on separate procedures. congestive heart failure . Severe biv Failure. alcoholic cardiomyopathy . underlying moderat to severe single vessel disease.  Prolong QTc with mupkltiple polymoprhic PVCs. telemetry reviewed.   discussed with EP. blake for lidocaine.    Unclear cause. last tikosyn dose few days ago. likely either underlying ischemia or severe CMP.  d./c lasix to avoid hypokalemia. recoeved significant potassium repltion  keep Mag > 2.5 and Potassium > 4.5 l thiamin and multivitamin.   ECG now.will repeat EP evaluation if abnormal EKG.  patient will atleast need life vest on discharge.   CHF: aldactone. low dose beta-blocker   afib:  recommend lovenox and avoid oral AC for now as patietn may need advance therapies if decompensates again.  f./u LFTs.  will recommend ICU stay for 24 hr so that he is more stabilized.  Critical care cardiology.  I have personally provided critical care time > 45 mins excluding time spent on separate procedures.

## 2022-02-06 NOTE — PROGRESS NOTE ADULT - PROBLEM SELECTOR PLAN 2
Currently sinus bradycardia with frequent ectopy  Dofetilide DC'd 2/6 secondary to prolonged QTc >500  Oral BB started, patient HR now in high 40's  Recommend reducing Lopressor 12.5mg PO to BID  AC: Eliquis 5mg BID  EP recs appreciated. Currently sinus bradycardia with frequent ectopy  Dofetilide DC'd 2/6 secondary to prolonged QTc >500  Oral BB started, patient HR now in high 40's  Recommend reducing Lopressor to 12.5mg PO BID  AC: Eliquis 5mg BID  EP recs appreciated. Currently sinus bradycardia with frequent ectopy  Dofetilide DC'd 2/6 secondary to prolonged QTc >500  Oral BB started, patient HR now in high 40's  Recommend reducing Lopressor to 12.5mg PO BID  AC: Recommend switching Eliquis to SQ Lovenox  EP recs appreciated.

## 2022-02-07 LAB
ALBUMIN SERPL ELPH-MCNC: 3.7 G/DL — SIGNIFICANT CHANGE UP (ref 3.3–5.2)
ALBUMIN SERPL ELPH-MCNC: 3.8 G/DL — SIGNIFICANT CHANGE UP (ref 3.3–5.2)
ALP SERPL-CCNC: 140 U/L — HIGH (ref 40–120)
ALP SERPL-CCNC: 144 U/L — HIGH (ref 40–120)
ALT FLD-CCNC: 637 U/L — HIGH
ALT FLD-CCNC: 696 U/L — HIGH
ANION GAP SERPL CALC-SCNC: 16 MMOL/L — SIGNIFICANT CHANGE UP (ref 5–17)
ANION GAP SERPL CALC-SCNC: 18 MMOL/L — HIGH (ref 5–17)
AST SERPL-CCNC: 259 U/L — HIGH
AST SERPL-CCNC: 301 U/L — HIGH
BILIRUB SERPL-MCNC: 5.7 MG/DL — HIGH (ref 0.4–2)
BILIRUB SERPL-MCNC: 5.8 MG/DL — HIGH (ref 0.4–2)
BUN SERPL-MCNC: 25.6 MG/DL — HIGH (ref 8–20)
BUN SERPL-MCNC: 27.7 MG/DL — HIGH (ref 8–20)
CALCIUM SERPL-MCNC: 9.2 MG/DL — SIGNIFICANT CHANGE UP (ref 8.6–10.2)
CALCIUM SERPL-MCNC: 9.2 MG/DL — SIGNIFICANT CHANGE UP (ref 8.6–10.2)
CHLORIDE SERPL-SCNC: 89 MMOL/L — LOW (ref 98–107)
CHLORIDE SERPL-SCNC: 89 MMOL/L — LOW (ref 98–107)
CO2 SERPL-SCNC: 24 MMOL/L — SIGNIFICANT CHANGE UP (ref 22–29)
CO2 SERPL-SCNC: 26 MMOL/L — SIGNIFICANT CHANGE UP (ref 22–29)
CREAT SERPL-MCNC: 1.1 MG/DL — SIGNIFICANT CHANGE UP (ref 0.5–1.3)
CREAT SERPL-MCNC: 1.19 MG/DL — SIGNIFICANT CHANGE UP (ref 0.5–1.3)
GLUCOSE SERPL-MCNC: 104 MG/DL — HIGH (ref 70–99)
GLUCOSE SERPL-MCNC: 99 MG/DL — SIGNIFICANT CHANGE UP (ref 70–99)
HCT VFR BLD CALC: 45.7 % — SIGNIFICANT CHANGE UP (ref 39–50)
HCT VFR BLD CALC: 49.2 % — SIGNIFICANT CHANGE UP (ref 39–50)
HGB BLD-MCNC: 15.5 G/DL — SIGNIFICANT CHANGE UP (ref 13–17)
HGB BLD-MCNC: 16.9 G/DL — SIGNIFICANT CHANGE UP (ref 13–17)
LACTATE SERPL-SCNC: 1.5 MMOL/L — SIGNIFICANT CHANGE UP (ref 0.5–2)
LACTATE SERPL-SCNC: 2.5 MMOL/L — HIGH (ref 0.5–2)
LACTATE SERPL-SCNC: 3.4 MMOL/L — HIGH (ref 0.5–2)
MAGNESIUM SERPL-MCNC: 2.2 MG/DL — SIGNIFICANT CHANGE UP (ref 1.6–2.6)
MAGNESIUM SERPL-MCNC: 2.3 MG/DL — SIGNIFICANT CHANGE UP (ref 1.6–2.6)
MCHC RBC-ENTMCNC: 31.4 PG — SIGNIFICANT CHANGE UP (ref 27–34)
MCHC RBC-ENTMCNC: 32 PG — SIGNIFICANT CHANGE UP (ref 27–34)
MCHC RBC-ENTMCNC: 33.9 GM/DL — SIGNIFICANT CHANGE UP (ref 32–36)
MCHC RBC-ENTMCNC: 34.3 GM/DL — SIGNIFICANT CHANGE UP (ref 32–36)
MCV RBC AUTO: 92.5 FL — SIGNIFICANT CHANGE UP (ref 80–100)
MCV RBC AUTO: 93.2 FL — SIGNIFICANT CHANGE UP (ref 80–100)
PHOSPHATE SERPL-MCNC: 4.4 MG/DL — SIGNIFICANT CHANGE UP (ref 2.4–4.7)
PHOSPHATE SERPL-MCNC: 4.5 MG/DL — SIGNIFICANT CHANGE UP (ref 2.4–4.7)
PLATELET # BLD AUTO: 65 K/UL — LOW (ref 150–400)
PLATELET # BLD AUTO: 66 K/UL — LOW (ref 150–400)
POTASSIUM SERPL-MCNC: 4.6 MMOL/L — SIGNIFICANT CHANGE UP (ref 3.5–5.3)
POTASSIUM SERPL-MCNC: 4.6 MMOL/L — SIGNIFICANT CHANGE UP (ref 3.5–5.3)
POTASSIUM SERPL-SCNC: 4.6 MMOL/L — SIGNIFICANT CHANGE UP (ref 3.5–5.3)
POTASSIUM SERPL-SCNC: 4.6 MMOL/L — SIGNIFICANT CHANGE UP (ref 3.5–5.3)
PROT SERPL-MCNC: 6.4 G/DL — LOW (ref 6.6–8.7)
PROT SERPL-MCNC: 6.8 G/DL — SIGNIFICANT CHANGE UP (ref 6.6–8.7)
RBC # BLD: 4.94 M/UL — SIGNIFICANT CHANGE UP (ref 4.2–5.8)
RBC # BLD: 5.28 M/UL — SIGNIFICANT CHANGE UP (ref 4.2–5.8)
RBC # FLD: 13 % — SIGNIFICANT CHANGE UP (ref 10.3–14.5)
RBC # FLD: 13.1 % — SIGNIFICANT CHANGE UP (ref 10.3–14.5)
SARS-COV-2 RNA SPEC QL NAA+PROBE: SIGNIFICANT CHANGE UP
SODIUM SERPL-SCNC: 131 MMOL/L — LOW (ref 135–145)
SODIUM SERPL-SCNC: 131 MMOL/L — LOW (ref 135–145)
WBC # BLD: 6.59 K/UL — SIGNIFICANT CHANGE UP (ref 3.8–10.5)
WBC # BLD: 6.88 K/UL — SIGNIFICANT CHANGE UP (ref 3.8–10.5)
WBC # FLD AUTO: 6.59 K/UL — SIGNIFICANT CHANGE UP (ref 3.8–10.5)
WBC # FLD AUTO: 6.88 K/UL — SIGNIFICANT CHANGE UP (ref 3.8–10.5)

## 2022-02-07 PROCEDURE — 99233 SBSQ HOSP IP/OBS HIGH 50: CPT

## 2022-02-07 PROCEDURE — 93010 ELECTROCARDIOGRAM REPORT: CPT

## 2022-02-07 PROCEDURE — 93451 RIGHT HEART CATH: CPT | Mod: 26

## 2022-02-07 RX ORDER — SODIUM CHLORIDE 9 MG/ML
500 INJECTION, SOLUTION INTRAVENOUS ONCE
Refills: 0 | Status: COMPLETED | OUTPATIENT
Start: 2022-02-07 | End: 2022-02-07

## 2022-02-07 RX ORDER — MAGNESIUM SULFATE 500 MG/ML
1 VIAL (ML) INJECTION ONCE
Refills: 0 | Status: COMPLETED | OUTPATIENT
Start: 2022-02-07 | End: 2022-02-07

## 2022-02-07 RX ORDER — METOPROLOL TARTRATE 50 MG
25 TABLET ORAL
Refills: 0 | Status: DISCONTINUED | OUTPATIENT
Start: 2022-02-07 | End: 2022-02-08

## 2022-02-07 RX ADMIN — CHLORHEXIDINE GLUCONATE 1 APPLICATION(S): 213 SOLUTION TOPICAL at 11:53

## 2022-02-07 RX ADMIN — Medication 1 TABLET(S): at 11:03

## 2022-02-07 RX ADMIN — ENOXAPARIN SODIUM 80 MILLIGRAM(S): 100 INJECTION SUBCUTANEOUS at 18:34

## 2022-02-07 RX ADMIN — Medication 100 GRAM(S): at 11:04

## 2022-02-07 RX ADMIN — Medication 100 GRAM(S): at 18:34

## 2022-02-07 RX ADMIN — Medication 100 MILLIGRAM(S): at 11:03

## 2022-02-07 RX ADMIN — SODIUM CHLORIDE 500 MILLILITER(S): 9 INJECTION, SOLUTION INTRAVENOUS at 18:35

## 2022-02-07 RX ADMIN — Medication 1 MILLIGRAM(S): at 11:04

## 2022-02-07 RX ADMIN — Medication 40 MILLIGRAM(S): at 06:17

## 2022-02-07 RX ADMIN — Medication 100 GRAM(S): at 02:00

## 2022-02-07 RX ADMIN — Medication 100 GRAM(S): at 06:18

## 2022-02-07 NOTE — PROGRESS NOTE ADULT - ATTENDING COMMENTS
Seen and examined, agree with above unless specified below  case d/w Dr. Jones from HF    I suspect that his current arrhythmia are secondary to his severe HF rather than a primary problem. I would defer initiation/holding/titration of BB to HF team given the recent and risk of cardiogenic shock.    May consider starting amiodarone if LFTs continue to improve.   Agree to continue with Lovenox till its clear that he wont need further invasive procedures.     Will follow up

## 2022-02-07 NOTE — PROGRESS NOTE ADULT - SUBJECTIVE AND OBJECTIVE BOX
[facility] Advanced Heart Failure  Office: [address] [place] [address]  Telephone: [phone]/Fax: [phone]    Subjective: ALDA. Pt.     Medications:  calcium gluconate IVPB 1 Gram(s) IV Intermittent every 6 hours  chlorhexidine 2% Cloths 1 Application(s) Topical daily  enoxaparin Injectable 80 milliGRAM(s) SubCutaneous two times a day  folic acid 1 milliGRAM(s) Oral daily  furosemide   Injectable 40 milliGRAM(s) IV Push daily  metoprolol tartrate 25 milliGRAM(s) Oral two times a day  multivitamin 1 Tablet(s) Oral daily  thiamine 100 milliGRAM(s) Oral daily    Vital Signs Last 24 Hours  T(C): 36.7 (02-07-22 @ 11:58), Max: 37.1 (02-06-22 @ 15:45)  HR: 73 (02-07-22 @ 13:00) (58 - 92)  BP: 87/61 (02-07-22 @ 13:00) (82/58 - 132/58)  RR: 21 (02-07-22 @ 13:00) (8 - 29)  SpO2: 98% (02-07-22 @ 13:00) (84% - 100%)    I&O's Summary    06 Feb 2022 07:01  -  07 Feb 2022 07:00  --------------------------------------------------------  IN: 1344 mL / OUT: 1050 mL / NET: 294 mL    07 Feb 2022 07:01  -  07 Feb 2022 14:19  --------------------------------------------------------  IN: 50 mL / OUT: 685 mL / NET: -635 mL    Tele: SR/SB, frequent ectopy APCs/PVCs    Physical Exam:  General: Sleeping, difficult to arouse  Neck: Neck supple. JVP difficult to assess-right dressing in place  Chest: Clear to auscultation bilaterally  CV: RRR. Normal S1 and S2. No murmurs, rub, or gallops. Warm peripherally  PV: No edema, pulses full/equal in all extremitites  Abdomen: Soft, non-distended, non-tender  Skin: warm, dry  Neurology: lethargic, arouses to physical stimuli    Labs:                        15.5   6.59  )-----------( 66       ( 07 Feb 2022 04:57 )             45.7     02-07    131<L>  |  89<L>  |  27.7<H>  ----------------------------<  104<H>  4.6   |  24.0  |  1.19    Ca    9.2      07 Feb 2022 04:57  Phos  4.5     02-07  Mg     2.3     02-07    TPro  6.4<L>  /  Alb  3.7  /  TBili  5.8<H>  /  DBili  x   /  AST  259<H>  /  ALT  637<H>  /  AlkPhos  140<H>  02-07    PTT - ( 06 Feb 2022 04:52 )  PTT:73.2 sec      Serum Pro-Brain Natriuretic Peptide: 1356 pg/mL (02-04 @ 04:36)  Serum Pro-Brain Natriuretic Peptide: 2970 pg/mL (02-03 @ 10:03)      Lactate, Blood: 3.4 mmol/L (02-07 @ 04:57)  Lactate, Blood: 2.5 mmol/L (02-07 @ 00:46)  Lactate, Blood: 2.2 mmol/L (02-06 @ 04:52)  Lactate, Blood: 2.1 mmol/L (02-05 @ 17:05)  Lactate, Blood: 1.2 mmol/L (02-05 @ 05:07)         [facility] Advanced Heart Failure  Office: [address] [place] [address]  Telephone: [phone]/Fax: [phone]    Subjective: NAEO. Pt. difficult to arouse, responded to physical stimuli.    Medications:  calcium gluconate IVPB 1 Gram(s) IV Intermittent every 6 hours  chlorhexidine 2% Cloths 1 Application(s) Topical daily  enoxaparin Injectable 80 milliGRAM(s) SubCutaneous two times a day  folic acid 1 milliGRAM(s) Oral daily  furosemide   Injectable 40 milliGRAM(s) IV Push daily  metoprolol tartrate 25 milliGRAM(s) Oral two times a day  multivitamin 1 Tablet(s) Oral daily  thiamine 100 milliGRAM(s) Oral daily    Vital Signs Last 24 Hours  T(C): 36.7 (02-07-22 @ 11:58), Max: 37.1 (02-06-22 @ 15:45)  HR: 73 (02-07-22 @ 13:00) (58 - 92)  BP: 87/61 (02-07-22 @ 13:00) (82/58 - 132/58)  RR: 21 (02-07-22 @ 13:00) (8 - 29)  SpO2: 98% (02-07-22 @ 13:00) (84% - 100%)    I&O's Summary    06 Feb 2022 07:01  -  07 Feb 2022 07:00  --------------------------------------------------------  IN: 1344 mL / OUT: 1050 mL / NET: 294 mL    07 Feb 2022 07:01  -  07 Feb 2022 14:19  --------------------------------------------------------  IN: 50 mL / OUT: 685 mL / NET: -635 mL    Tele: SR/SB, frequent ectopy APCs/PVCs    Physical Exam:  General: Sleeping, difficult to arouse  Neck: Neck supple. JVP difficult to assess-right dressing in place  Chest: Clear to auscultation bilaterally  CV: RRR. Normal S1 and S2. No murmurs, rub, or gallops. Warm peripherally  PV: No edema, pulses full/equal in all extremities  Abdomen: Soft, non-distended, non-tender  Skin: warm, dry  Neurology: lethargic, arouses to physical stimuli    Labs:                        15.5   6.59  )-----------( 66       ( 07 Feb 2022 04:57 )             45.7     02-07    131<L>  |  89<L>  |  27.7<H>  ----------------------------<  104<H>  4.6   |  24.0  |  1.19    Ca    9.2      07 Feb 2022 04:57  Phos  4.5     02-07  Mg     2.3     02-07    TPro  6.4<L>  /  Alb  3.7  /  TBili  5.8<H>  /  DBili  x   /  AST  259<H>  /  ALT  637<H>  /  AlkPhos  140<H>  02-07    PTT - ( 06 Feb 2022 04:52 )  PTT:73.2 sec      Serum Pro-Brain Natriuretic Peptide: 1356 pg/mL (02-04 @ 04:36)  Serum Pro-Brain Natriuretic Peptide: 2970 pg/mL (02-03 @ 10:03)      Lactate, Blood: 3.4 mmol/L (02-07 @ 04:57)  Lactate, Blood: 2.5 mmol/L (02-07 @ 00:46)  Lactate, Blood: 2.2 mmol/L (02-06 @ 04:52)  Lactate, Blood: 2.1 mmol/L (02-05 @ 17:05)  Lactate, Blood: 1.2 mmol/L (02-05 @ 05:07)

## 2022-02-07 NOTE — PROGRESS NOTE ADULT - ASSESSMENT
Risk Assessments:  ASA: IV  Mallampati: 2  GFR: 57  Cr: 1.19  BRA: 9.5      Impression: 81M PMH EtOH abuse, HTN, and macular degeneration who presented to [facility] ER on 2/1 c/o weakness, dyspnea on exertion, intermittent chest pain, and decreased PO intake. Pt was instructed to go to the ER after outpatient EKG revealed a flutter w/ RVR. Underwent cardiac catheterization which was revealing of EF 10-15% and 70% mid LAD occlusion. IABP placed. Lucas presley catheter inserted. Cardiac index 1.8. Milrinone infusion initiated at 0.125mcg. Pt was transferred to [facility] for continuation of care where his course has been complicated by cardiogenic shock and with IV vasopressors added for hemodynamic support, acute renal failure, and AFib with RVR. Was given Dofetilide but held given QTc prolongation. Pt was weaned off Milrinone and Levophed. Seen by heart failure specialist and recommending RHC with swan-presley catheter for CO/CI, invasive hemodynamically monitoring ongoing cardiogenic shock.     Plan: Right Heart Cath with Lucas Presley placement     -plan for RHC  -patient seen and examined, elevated risk for procedure: benefits outweigh risk   -low bp 89/71 however MAP 79  -cardiogenic shock with shock liver and lactemia   -confirmed appropriate NPO duration  -ECG and Labs reviewed  -Aspirin 81mg held in setting of thrombocytopenia   -procedure discussed with patient; risks and benefits explained, questions answered  -consent obtained by attending IC

## 2022-02-07 NOTE — PROGRESS NOTE ADULT - SUBJECTIVE AND OBJECTIVE BOX
Department of Cardiology                                                                  [facility]/[place] [address] [id number]                                                            Telephone: [phone]. Fax:[phone]                                                                                      Cardiac Cath NP Note           Patient is a 81y old  Male who presents with a chief complaint of Cardiogenic shock (2022 11:56)    Narrative  81M PMH EtOH abuse, HTN, and macular degeneration who [place] [facility] [place]/o weakness, dyspnea on exertion, intermittent chest pain, and decreased PO intake. Pt was instructed to go to the ER after outpatient EKG revealed a flutter w/ RVR. Underwent cardiac catheterization which was revealing of EF 10-15% and 70% mid LAD occlusion. IABP placed. Charlotte tejas catheter inserted. Cardiac index 1.8. Milrinone infusion initiated at 0.125mcg. Pt was transferred to [facility] for continuation of care where his course has been complicated by cardiogenic shock and with IV vasopressors added for hemodynamic support, acute renal failure, and AFib with RVR. Was given Dofetilide but held given QTc prolongation. Pt was weaned off Milrinone and Levophed. Seen by heart failure specialist and recommending RHC with swan-tejas catheter for CO/CI, invasive hemodynamically monitoring ongoing cardiogenic shock.       Plan: RHC/swan-tejas catheter       PAST MEDICAL & SURGICAL HISTORY:  ETOH abuse    HTN (hypertension)    Macular degeneration        RADIOLOGY & ADDITIONAL STUDIES/DIAGNOSTIC TESTING:      ECHO  FINDINGS:  < from: TTE Echo Complete w/o Contrast w/ Doppler (22 @ 11:01) >  EXAM:  ECHO TTE WO CON COMP W DOPP      PROCEDURE DATE:  Feb  3 2022   .      INTERPRETATION:  TRANSTHORACIC ECHOCARDIOGRAM REPORT        Patient Name:   CORWIN STUBBS Patient Location: Inpatient  Medical Rec #:  [id number]          Accession #:    [id number]  Account #:                        Height:           69.7 in 177.0 cm  YOB: 1940          Weight:           169.8 lb 77.00 kg  Patient Age:    81 years          BSA:              1.94 m²  Patient Gender: M                 BP:               91/57 mmHg      Date of Exam:        2/3/2022 11:01:06 AM  Sonographer:         Mitch Ramirez  Referring Physician: Santy Cintron    Procedure:     2D Echo/Doppler/Color Doppler Complete and Echocardiogram   with                 Definity Contrast.  Indications:   Cardiogenic shock - R57.0  Diagnosis:     Cardiogenic shock - R57.0  Study Details: Technically difficult study.        2D AND M-MODE MEASUREMENTS (normal ranges within parentheses):  Left                 Normal   Aorta/Left            Normal  Ventricle:                    Atrium:  IVSd (2D):    1.21  (0.7-1.1) Aortic Root    3.08  (2.4-3.7)                 cm             (2D):           cm  LVPWd (2D):   1.05  (0.7-1.1) Left Atrium    3.06  (1.9-4.0)                 cm             (2D):           cm  LVIDd (2D):   6.09  (3.4-5.7) LA Volume      31.5                 cm             Index         ml/m²  LVIDs (2D):   4.91            Right Ventricle:                 cm             TAPSE:           3.37 cm  LV FS (2D):   19.5   (>25%)                  %  Relative Wall 0.34   (<0.42)  Thickness    LV SYSTOLIC FUNCTION BY 2D PLANIMETRY (MOD):  EF-A4C View: 21.3 % EF-A2C View: 22.3 % EF-Biplane: 21 %    LV DIASTOLIC FUNCTION:  MV Peak E: 0.37 m/s e', MV Mary: 0.04 m/s  MV Peak A: 0.44 m/s E/e' Ratio: 9.69  E/A Ratio: 0.85    SPECTRAL DOPPLER ANALYSIS (where applicable):  Aortic Valve: AoV Max Cordell: 1.53 m/s AoV Peak P.4 mmHg AoV Mean PG:   3.5 mmHg    LVOT Vmax: 0.88 m/s LVOT VTI: 0.085 m LVOT Diameter: 2.05 cm    AoV Area, Vmax: 1.90 cm² AoV Area, VTI: 1.68 cm² AoV Area, Vmn: 1.64 cm²  Ao VTI: 0.166  Tricuspid Valve and PA/RV Systolic Pressure: TR Max Velocity: 2.60 m/s RA   Pressure: 15 mmHg RVSP/PASP: 42.0 mmHg      PHYSICIAN INTERPRETATION:  Left Ventricle: Endocardial visualization was enhanced with intravenous   echo contrast. The left ventricular internal cavity size is mildly   increased. Left ventricular wall thickness is normal.  Global LV systolic function was severely decreased. Left ventricular   ejection fraction, by visual estimation, is 20 to 25%. Spectral Doppler   shows impaired relaxation pattern of left ventricular myocardial filling   (Grade I diastolic dysfunction).  Right Ventricle: The right ventricular size is mildly enlarged. RV   systolic function is normal.  Left Atrium: The left atrium is normal in size.  Right Atrium: The right atrium is normal in size.  Pericardium: Trivial pericardial effusion is present.  Mitral Valve: The mitral valve leaflets are tethered whichis due to   reduced systolic function and elevated LVDP. There is mild mitral annular   calcification. Mild to moderate mitral valve regurgitation is seen.  Tricuspid Valve: The tricuspid valve is normal in structure. Mild   tricuspid regurgitation is visualized. Estimated pulmonary artery   systolic pressure is 42.0 mmHg assuming a right atrial pressure of 15   mmHg, which is consistent with mild pulmonary hypertension.  Aortic Valve: The aortic valve was not well visualized. Sclerotic aortic   valve with normal opening. Mild aortic valve regurgitation is seen.  Pulmonic Valve: The pulmonic valve was not well visualized. Trace   pulmonic valve regurgitation.  Aorta: The aortic root is normal in size and structure.  Pulmonary Artery: The pulmonary artery is not well seen.  Venous: The inferior vena cava was dilated, with respiratory size   variation less than 50%.  Additional Comments: A venous catheter is visualized in the right atrium   and right ventricle.  In comparison to the previous echocardiogram(s): There are no prior   studies on this patient for comparison purposes.      Summary:   1. Technically difficult study.   2. Left ventricular ejection fraction, by visual estimation, is 20 to   25%.   3. Severely decreased global left ventricular systolic function.   4. Spectral Doppler shows impaired relaxation pattern of left   ventricular myocardial filling (Grade I diastolic dysfunction).   5. Mildly enlarged right ventricle.   6. The left atrium is normal in size.   7. Mildmitral annular calcification.   8. Mild to moderate mitral valve regurgitation.   9. The mitral valve leaflets are tethered which is due to reduced   systolic function and elevated LVDP.  10. Mild tricuspid regurgitation.  11. Estimated pulmonary artery systolic pressure is 42.0 mmHg assuming a   right atrial pressure of 15 mmHg, which is consistent with mild pulmonary   hypertension.  12. Mild aortic regurgitation.  13. Sclerotic aortic valve with normal opening.  14. Trivial pericardial effusion.  15. Endocardial visualization was enhanced with intravenous echo contrast.  16. Pulse wave Doppler in the descending aorta is consistent with IABP in   place.  17. There are no prior studies on this patient for comparison purposes.      CATHETERIZATION  FINDINGS:        Allergies    No Known Allergies    Intolerances        MEDICATIONS  (STANDING):  calcium gluconate IVPB 1 Gram(s) IV Intermittent every 6 hours  chlorhexidine 2% Cloths 1 Application(s) Topical daily  enoxaparin Injectable 80 milliGRAM(s) SubCutaneous two times a day  folic acid 1 milliGRAM(s) Oral daily  furosemide   Injectable 40 milliGRAM(s) IV Push daily  metoprolol tartrate 25 milliGRAM(s) Oral two times a day  multivitamin 1 Tablet(s) Oral daily  thiamine 100 milliGRAM(s) Oral daily    MEDICATIONS  (PRN):      FAMILY HISTORY:      SOCIAL HISTORY:  lives with wife    CIGARETTES: denies     ALCOHOL: daily 4-5 liquor     REVIEW OF SYSTEMS:  General: No fevers/chills, or fatigue  HEENT: No visual disturbances, no hearing loss, no headaches, no epistaxis.  CV: No chest pain,no palpitations, no edema, no orthopnea, no PND, or ORELLANA  Respiratory: No dyspnea, no wheeze, no cough.  GI: no nausea/vomiting, no black/bloody stools.  : No hematuria  Musculoskeletal: No myalgias, no arthralgias, no back pain.    Vital Signs Last 24 Hrs  T(C): 36.7 (2022 11:58), Max: 37.1 (2022 15:45)  T(F): 98 (2022 11:58), Max: 98.7 (2022 15:45)  HR: 73 (2022 13:00) (58 - 92)  BP: 87/61 (2022 13:00) (82/58 - 132/58)  BP(mean): 71 (2022 13:00) (66 - 88)  RR: 21 (2022 13:00) (8 - 29)  SpO2: 98% (2022 13:00) (84% - 100%)    Daily     Daily     I&O's Detail    2022 07:01  -  2022 07:00  --------------------------------------------------------  IN:    IV PiggyBack: 300 mL    IV PiggyBack: 200 mL    Oral Fluid: 844 mL  Total IN: 1344 mL    OUT:    Indwelling Catheter - Urethral (mL): 1050 mL  Total OUT: 1050 mL    Total NET: 294 mL      2022 07:01  -  2022 14:17  --------------------------------------------------------  IN:    IV PiggyBack: 50 mL  Total IN: 50 mL    OUT:    Indwelling Catheter - Urethral (mL): 685 mL  Total OUT: 685 mL    Total NET: -635 mL          PHYSICAL EXAM:   GENERAL: Pt OOB to reclining chair   ENMT: PERRL, +EOMI, scelera icterus   NECK: soft, Supple, mild JVD   CHEST/LUNG: CTA diminished bibasilar   HEART: S1S2, irregular  ABDOMEN: Soft, Nontender, Nondistended; Bowel sounds present.  MUSCULOSKELETAL: Normal range of motion.  SKIN: No rashes or lesions.  NEURO: AAOX3, no focal deficits, no motor r sensory loss.  PSYCH: normal mood.      INTERPRETATION OF TELEMETRY: SR/PAF with frequent multifocal PVC's, NSVT     ECG: SB with prolonged Qtc 568, PVCc     LABS:                        15.5   6.59  )-----------( 66       ( 2022 04:57 )             45.7     02-    131<L>  |  89<L>  |  27.7<H>  ----------------------------<  104<H>  4.6   |  24.0  |  1.19    Ca    9.2      2022 04:57  Phos  4.5     02-  Mg     2.3     -    TPro  6.4<L>  /  Alb  3.7  /  TBili  5.8<H>  /  DBili  x   /  AST  259<H>  /  ALT  637<H>  /  AlkPhos  140<H>  02        PTT - ( 2022 04:52 )  PTT:73.2 sec    I&O's Summary    2022 07:01  -  2022 07:00  --------------------------------------------------------  IN: 1344 mL / OUT: 1050 mL / NET: 294 mL    2022 07:01  -  2022 14:17  --------------------------------------------------------  IN: 50 mL / OUT: 685 mL / NET: -635 mL

## 2022-02-07 NOTE — PROGRESS NOTE ADULT - PROBLEM SELECTOR PLAN 4
Likely secondary to shock liver   TBili peaked at 5.5, starting to improve  LFTs continue to improve Likely secondary to shock liver   TBili increasing again  LFTs continue to improve

## 2022-02-07 NOTE — PROGRESS NOTE ADULT - ATTENDING COMMENTS
Patient is an 81 year old male with past medical history of HLD and active alcohol use who came to the hospital with chest pain and SOB. He was found to be in afib with RVR. Echo was performed which showed severe LV dysfunction. Labs were significant for significantly elevated LFTs and elevated creatinine. A LHC was performed which showed a 70% mLAD lesion but given the degree of his cardiomyopathy is out of proportion to the lesion so it was not stented. A RHC was performed which showed elevated filling pressures and cardiac index of 1.8, with pa sat of 57. An intraaortic IABP was placed and milrinone was started and he was sent over to [facility]. At [facility] on this support, his pa sat improved to 71 and his end organ function improved.   - Patient tolerated IABP weaned 2/3/2022  - Levophed and milrinone were weaned off this weekend  -Noted to have low BP and worsening lactic acid. RHC was performed which showed low filling pressures with preserved cardiac output. REcommend holding diuretics and giving back 500cc of fluids. Repeat lactic acid after fluid adminsitration  - c/w metoprolol tartrate 25mg BID  - will attempt to initiate GDMT in the next coming days  - Patient has an irregular rhythm, EP recs appreciated  - Spoke to patient extensively about needing to quit alcohol, which is likely contributing to his cardiomyopathy .

## 2022-02-07 NOTE — PHYSICAL THERAPY INITIAL EVALUATION ADULT - ASSISTIVE DEVICE FOR TRANSFER: GAIT, REHAB EVAL
No device, pt occasionally reaching for IV pole for support, + right sided lean and stepping strategies used on occasion to maintain balance.

## 2022-02-07 NOTE — PROGRESS NOTE ADULT - ASSESSMENT
81 year old male patient with a history of HTN, macular degeneration, and daily ETOH consumption who [place] [facility] [place] with acute systolic heart failure (non-ischemic), EF 10-15%, and new onset atrial flutter/fibrillation (CHADSVASC: 5) with RVR. Underwent cardiac catheterization which demonstrated  of EF 10-15% and 70% mid LAD occlusion, Cardiac index 1.8. IABP was placed, milrinone initiated and he was transferred to [facility]  for further management of cardiogenic shock. He subsequently spontaneously converted to sinus rhythm, IABP was ultimately d/c'd and he is now weaned off of pressor support an milrinone. Continues to have frequent salvoes of non-sustained AT,. and NSVTs.      1. Atrial flutter  - Currently on Lovenox. Anticipate transition to Eliquis in the near term  - possible CTI ablation prior to d/c as rate control is not always feasible for AFL - this can be performed on uninterrupted Eliquis.   - Dofetilide d/c'd due to QTc >500ms. QTc now WNL.   - Titrate metoprolol as tolerated.     2. AFIB  - Counseled on need to decrease alcohol us   - CHADS-VASC = 5 (age x 2, HTN, HF, CAD). Long term a/c as above.   - BB as above    3. Frequent ectopy  - [place] subjectively improving  - BB will also be beneficial  - no need for acute intervention    4. Bradycardia and intermittent/occasional drop P waves.  - most c/w non conducted PACs  - No evidence of AVB/high risk pauses  - No current indication for pacing support    5. HF/cardiogenic shock  - off IABP, milrinone and Levo.   - Cardiology, HF and ICU managing  - initiate GDMT - start w/ BB as above.   - Will consider LifeVest prior to DC and r/a EF in 3 months after rhythm control, alcohol abstinence and GDMT before advising for ICD    Will d/w Dr. Frausto; further recs to follow.  81 year old male patient with a history of HTN, macular degeneration, and daily ETOH consumption who [place] [facility] [place] failure (non-ischemic), EF 10-15%, and new onset atrial flutter/fibrillation (CHADSVASC: 5) with RVR. Underwent cardiac catheterization which demonstrated  of EF 10-15% and 70% mid LAD occlusion, Cardiac index 1.8. IABP was placed, milrinone initiated and he was transferred to [facility]  for further management of cardiogenic shock. He subsequently spontaneously converted to sinus rhythm, IABP was ultimately d/c'd and he is now weaned off of pressor support an milrinone. Continues to have frequent salvoes of non-sustained AT,. and NSVTs.      1. Atrial flutter  - Currently on Lovenox. Anticipate transition to Eliquis in the near term  - will consider CTI ablation prior to d/c as rate control is not always feasible for AFL - this can be performed on uninterrupted Eliquis.   - Dofetilide d/c'd due to QTc >500ms. QTc now back to baseline.  - Titrate metoprolol as tolerated.     2. AFIB  - Counseled on need to decrease alcohol us   - CHADS-VASC = 5 (age x 2, HTN, HF, CAD). Long term a/c as above.   - BB as above    3. Frequent ectopy  - [place] subjectively improving  - BB will also be beneficial  - no need for acute intervention    4. Bradycardia and intermittent/occasional drop P waves.  - most c/w non conducted PACs  - No evidence of AVB/high risk pauses  - No current indication for pacing support    5. HF/cardiogenic shock  - off IABP, milrinone and Levo.   - Cardiology, HF and ICU managing  - initiate GDMT - start w/ BB as above.   - Will consider LifeVest prior to DC and r/a EF in 3 months after rhythm control, alcohol abstinence and GDMT before advising for ICD    Will d/w Dr. Frausto; further recs to follow.  81 year old male patient with a history of HTN, macular degeneration, and daily ETOH consumption who [place] [facility] [place] failure (non-ischemic), EF 10-15%, and new onset atrial flutter/fibrillation (CHADSVASC: 5) with RVR. Underwent cardiac catheterization which demonstrated  of EF 10-15% and 70% mid LAD occlusion, Cardiac index 1.8. IABP was placed, milrinone initiated and he was transferred to [facility]  for further management of cardiogenic shock. He subsequently spontaneously converted to sinus rhythm, IABP was ultimately d/c'd and he is now weaned off of pressor support and milrinone. Continues to have frequent salvoes of non-sustained AT as well as NSVT, although the burden of ectopy is subjectively decreasing.       1. Atrial flutter  - Currently on Lovenox. Anticipate transition to Eliquis in the near term  - will consider CTI ablation prior to d/c as rate control is not always feasible for AFL - this can be performed on uninterrupted Eliquis.   - Dofetilide d/c'd due to QTc >500ms. QTc now back to baseline.  - Titrate metoprolol as tolerated.     2. AFIB  - Counseled on need to decrease alcohol us   - CHADS-VASC = 5 (age x 2, HTN, HF, CAD). Long term a/c as above.   - BB as above    3. Frequent ectopy  - [place] subjectively improving  - BB will also be beneficial  - no need for acute intervention    4. Bradycardia and intermittent/occasional drop P waves.  - most c/w non conducted PACs  - No evidence of AVB/high risk pauses  - No current indication for pacing support    5. HF/cardiogenic shock  - off IABP, milrinone and Levo.   - Cardiology, HF and ICU managing  - initiate GDMT - start w/ BB as above.   - Will consider LifeVest prior to DC and r/a EF in 3 months after rhythm control, alcohol abstinence and GDMT before advising for ICD    Will d/w Dr. Frausto; further recs to follow.

## 2022-02-07 NOTE — CHART NOTE - NSCHARTNOTEFT_GEN_A_CORE
RIJ PA catheter removed. First, balloon deflated & wire withdrawn from Introducer Catheter. RIJ Introducer catheter site undressed under clean conditions, sutures cut and removed. Line removed on exhalation with no resistance, hemostasis achieved with manual pressure. Site soft with no oozing noted, pressure bandage applied. Patient tolerated procedure well.
Repeat EKG showing QTc of 725. Spoke w/ EP Dr. Tomás Liang, who examined EKG. States many PVCs and QTc range closer to 450-500. Will continue to monitor.
Spoke w/ wife at bedside and son via telephone. Updated on patient's condition. All questions/concerns answered.
CCL NP Update/IABP Removal:    IABP weaned to 1:3 this am, tolerating well with repeat venous gas at 1048am:    pH 7.4   PO2 <42  PCO2 48  HCO3 33  lactate 1.2  mixed venous sat 71%    Heparin gtt stopped at 1115am  1243 PT/INR 22/1.97, PTT 39.6     at 1315    IABP via RFA removed and manual pressure X 40min, tolerated well, tele baseline AF with PVCs 's, BP 90's/60-70's, RR 18-20, O2 sat 96% RA, RFA access site no bleed or hematoma, dp/pt 2+, DSD applied, maintain BR and keep RLE straight X 6hours.
Family called (Charbel at bedside and Other two kids on the phone), updated about medical conditions and All questions answered.   NOK Daughter and Wife
Family/ Son Sarath at bedside, updated about medical conditions and All questions answered.
Family/Son at bedside, updated about medical conditions and All questions answered.
Source: Patient [ ]  Family [ ]   other [x ] EMR and discussed in A.M. rounds     Current Diet: Diet, DASH/TLC:   Sodium & Cholesterol Restricted  Supplement Feeding Modality:  Oral  Ensure Clear Cans or Servings Per Day:  2       Frequency:  Two Times a day (02-06-22 @ 19:00)    PO intake:  < 50% [x ]   50-75%  [ ]   %  [ ]  other :    Source for PO intake [ ] Patient [ ] family [x ] chart [x ] staff [ ] other    Current Weight:   2/2: 76.9 kg     % Weight Change: N/A (No new labs to assess at this time, Trace dependent edema noted)     Pertinent Medications: MEDICATIONS  (STANDING):  calcium gluconate IVPB 1 Gram(s) IV Intermittent every 6 hours  chlorhexidine 2% Cloths 1 Application(s) Topical daily  enoxaparin Injectable 80 milliGRAM(s) SubCutaneous two times a day  folic acid 1 milliGRAM(s) Oral daily  furosemide   Injectable 40 milliGRAM(s) IV Push daily  metoprolol tartrate 25 milliGRAM(s) Oral two times a day  multivitamin 1 Tablet(s) Oral daily  thiamine 100 milliGRAM(s) Oral daily    MEDICATIONS  (PRN):    Pertinent Labs: CBC Full  -  ( 07 Feb 2022 04:57 )  WBC Count : 6.59 K/uL  RBC Count : 4.94 M/uL  Hemoglobin : 15.5 g/dL  Hematocrit : 45.7 %  Platelet Count - Automated : 66 K/uL  Mean Cell Volume : 92.5 fl  Mean Cell Hemoglobin : 31.4 pg  Mean Cell Hemoglobin Concentration : 33.9 gm/dL  Auto Neutrophil # : x  Auto Lymphocyte # : x  Auto Monocyte # : x  Auto Eosinophil # : x  Auto Basophil # : x  Auto Neutrophil % : x  Auto Lymphocyte % : x  Auto Monocyte % : x  Auto Eosinophil % : x  Auto Basophil % : x        Skin: Surgical site R groin     Nutrition focused physical exam Previously conducted - found signs of malnutrition [ ]absent [x ]present    Subcutaneous fat loss: Mild [x ] Orbital fat pads region, [ ]Buccal fat region, [ ]Triceps region,  [ ]Ribs region    Muscle wasting: Mild [x ]Temples region, Mod [x ]Clavicle region, [ x]Shoulder region, [ ]Scapula region, [ ]Interosseous region,  [ ]thigh region, [ ]Calf region    Estimated Needs:   [x ] no change since previous assessment  [ ] recalculated:     Current Nutrition Diagnosis:  Pt remains at high nutrition risk secondary to moderate (acute on chronic) protein calorie malnutrition related to inability to meet sufficient protein-energy requirements in setting of alcohol abuse, decreased appetite, cardiogenic shock, acute systolic biventricular heart failure, shock liver, AF with RVR  as evidenced by NFPE findings; suspect meeting <75% nutrient needs >7 days. Pt continues to have fair to poor PO intake noted per chart. Pt sleeping soundly during visit. Recommendations below:       Recommendations:   1. RX: MVI and Vit. C daily (500mg).   2. Check weight daily to monitor trends   3. Encourage with meals.   4. Consider changing Ensure Clear to Ensure Enlive TID   5. Monitor labs/PO intake     Monitoring and Evaluation:   [ ] PO intake [x ] Tolerance to diet prescription [X] Weights  [X] Follow up per protocol [X] Labs:

## 2022-02-07 NOTE — PHYSICAL THERAPY INITIAL EVALUATION ADULT - ADDITIONAL COMMENTS
Pt lives in a private home with his wife. No steps to navigate. Pt was independent PTA without an assist device. Pt owns no DME.

## 2022-02-07 NOTE — PROGRESS NOTE ADULT - PROBLEM SELECTOR PLAN 2
Currently SR with frequent ectopy  Dofetilide DC'd 2/6 secondary to prolonged QTc >500  On BB as above  AC: SQ Lovenox  EP recs appreciated.

## 2022-02-07 NOTE — CHART NOTE - NSCHARTNOTESELECT_GEN_ALL_CORE
Event Note
Nutrition Services
EKG/Event Note
family/Event Note
line removal/Event Note

## 2022-02-07 NOTE — PROGRESS NOTE ADULT - ASSESSMENT
82 y/o male with PMH significant for HTN, EtOH abuse and glaucoma who [place] [facility] [place]1 with c/o CP and ORELLANA. He was noted to be in atrial flutter w/ RVR. Pt. underwent RHC/LHC which revealed 70% mLAD lesion and LVEF 10-15% with low CI ~1.8. IABP and PA cath placed and patient transferred to [facility] 2/2 for further management of cardiogenic shock.    2/3/22: Pt hemodynamically stable, warm and well perfused with very good UOP response to diuretics. Would attempt to wean pressors and possibly IABP as well. EP consulted for aflutter/afib.    2/4/22: Low dose Levophed resumed overnight to maintain augmented BP . Negative L, diuretics held today. Plan to remove IABP.     2/7/22: Pt more lethargic today, was confused overnight per RN. BP relatively stable. UOP 50-100cc/hr. Appears warm/well perfused on exam and LFTs continue to improve but increase in lactic acid/change in MS is concerning. Recommend RHC/[place] placement for further assessment of CO/CI to guide management.    Hemodynamics:    2/3/22: Milrinone 0.125mcg/kg/min HR 98, BP 92/62/81, augmented 100, CVP 7-9, PAP 38/15/22, TD CO/CI 6.3/3.2, BRAN CO/CI 3.7/1.9. MVO2 70% on 1:1 and 72% on 1:3.  2/4/22: Milrinone 0.125mcg/kg/min, Levo 0.05mcg/kg/min , BP 91/70/79, augmented 100, CVP 5, PAP 37/11/19, TD CI 2.2, MVO2 74.7 (IABP 1:1)   82 y/o male with PMH significant for HTN, EtOH abuse and glaucoma who [place] [facility] [place] with c/o CP and ORELLANA. He was noted to be in atrial flutter w/ RVR. Pt. underwent RHC/LHC which revealed 70% mLAD lesion and LVEF 10-15% with low CI ~1.8. IABP and PA cath placed and patient transferred to [facility] 2/2 for further management of cardiogenic shock.    2/3/22: Pt hemodynamically stable, warm and well perfused with very good UOP response to diuretics. Would attempt to wean pressors and possibly IABP as well. EP consulted for aflutter/afib.    2/4/22: Low dose Levophed resumed overnight to maintain augmented BP . Negative L, diuretics held today. Plan to remove IABP.     2/7/22: Pt more lethargic today, was confused overnight per RN. BP relatively stable. UOP 50-100cc/hr. Appears warm/well perfused on exam. Increase in lactic acid/TBili/change in MS is concerning. Recommend RHC/[place] placement for further assessment of CO/CI to guide management.    Hemodynamics:    2/3/22: Milrinone 0.125mcg/kg/min HR 98, BP 92/62/81, augmented 100, CVP 7-9, PAP 38/15/22, TD CO/CI 6.3/3.2, BRAN CO/CI 3.7/1.9. MVO2 70% on 1:1 and 72% on 1:3.  2/4/22: Milrinone 0.125mcg/kg/min, Levo 0.05mcg/kg/min , BP 91/70/79, augmented 100, CVP 5, PAP 37/11/19, TD CI 2.2, MVO2 74.7 (IABP 1:1)

## 2022-02-07 NOTE — PROGRESS NOTE ADULT - ASSESSMENT
81M admitted with cardiogenic shock, acute systolic biventricular heart failure, JOON, shock liver, AF with RVR    Impression/Plan:      Cardiogenic shock   - Weaned off Milrinone  - S/p IABP, s/p [place]-Presley catheter  - Lasix 40mg IV QDaily  - Monitor Is/Os closely    QTc prolongation  - Serial EKG  - S/p IV Mg    AFib with RVR  - S/p Dofetilide, held given QTc prolongation  - Lopressor 25mg PO BID  - Monitor rhythm on telemetry  - FD Lovenox 80mg BID    JOON  - Resolved  - Monitor UOP, Is/Os, renally adjust medications    EtOH - not actively withdrawing  - Thiamine, folic acid, MVI    F/E/N/PPx/Lines  - Lasix as above  - Replete for K>4 Mg>2  - DASH diet  - FD Lovenox as above  - PIV    Ethics/Dispo  - Full code  - If QTc stable and labwork stable, will downgrade from ICU to step down      Ken Chowdhury M.D.  Pulmonary & Critical Care Medicine  [facility]  Pulmonary and Sleep Medicine at [place]  [address]  [place] [address]  T: [phone]  F: [phone]

## 2022-02-07 NOTE — PHYSICAL THERAPY INITIAL EVALUATION ADULT - GENERAL OBSERVATIONS, REHAB EVAL
Pt received supine in bed + telemetry/ + IV + Venous Compression Boots + garza + bed alarm, + O2 3Lnc, c/o 0/10 pain, pt agreeable to PT

## 2022-02-07 NOTE — PROGRESS NOTE ADULT - SUBJECTIVE AND OBJECTIVE BOX
Patient is a 81y old  Male who presents with a chief complaint of Cardiogenic shock (06 Feb 2022 13:18)      BRIEF HOSPITAL COURSE:   81M PMH EtOH abuse, HTN, and macular degeneration who presented to [facility] ER on 2/1 c/o weakness, dyspnea on exertion, intermittent chest pain, and decreased PO intake. Pt was instructed to go to the ER after outpatient EKG revealed a flutter w/ RVR. Underwent cardiac catheterization which was revealing of EF 10-15% and 70% mid LAD occlusion. IABP placed. Clyde presley catheter inserted. Cardiac index 1.8. Milrinone infusion initiated at 0.125mcg. Pt was transferred to [facility] for continuation of care where his course has been complicated by cardiogenic shock and with IV vasopressors added for hemodynamic support, acute renal failure, and AFib with RVR. Was given Dofetilide but held given QTc prolongation, and was given IV Magnesium. Pt was weaned off Milrinone and Levophed.      PAST MEDICAL & SURGICAL HISTORY:  ETOH abuse    HTN (hypertension)    Macular degeneration          Medications:    furosemide   Injectable 40 milliGRAM(s) IV Push daily  metoprolol tartrate 25 milliGRAM(s) Oral two times a day          enoxaparin Injectable 80 milliGRAM(s) SubCutaneous two times a day          calcium gluconate IVPB 1 Gram(s) IV Intermittent every 6 hours  folic acid 1 milliGRAM(s) Oral daily  multivitamin 1 Tablet(s) Oral daily  thiamine 100 milliGRAM(s) Oral daily      chlorhexidine 2% Cloths 1 Application(s) Topical daily            ICU Vital Signs Last 24 Hrs  T(C): 36.7 (06 Feb 2022 20:01), Max: 37.1 (06 Feb 2022 15:45)  T(F): 98.1 (06 Feb 2022 20:01), Max: 98.7 (06 Feb 2022 15:45)  HR: 76 (06 Feb 2022 20:00) (57 - 88)  BP: 89/58 (06 Feb 2022 20:00) (83/60 - 108/78)  BP(mean): 68 (06 Feb 2022 20:00) (62 - 87)  ABP: --  ABP(mean): --  RR: 15 (06 Feb 2022 20:00) (15 - 36)  SpO2: 90% (06 Feb 2022 20:00) (85% - 100%)      ABG - ( 06 Feb 2022 09:08 )  pH, Arterial: 7.560 pH, Blood: x     /  pCO2: 32    /  pO2: 85    / HCO3: 29    / Base Excess: 6.5   /  SaO2: 98.7                I&O's Detail    05 Feb 2022 07:01  -  06 Feb 2022 07:00  --------------------------------------------------------  IN:    Heparin Infusion: 230 mL    IV PiggyBack: 100 mL    Milrinone: 2.9 mL  Total IN: 332.9 mL    OUT:    Indwelling Catheter - Urethral (mL): 1460 mL    Norepinephrine: 0 mL  Total OUT: 1460 mL    Total NET: -1127.1 mL      06 Feb 2022 07:01  -  07 Feb 2022 00:02  --------------------------------------------------------  IN:    IV PiggyBack: 100 mL    IV PiggyBack: 100 mL    Oral Fluid: 844 mL  Total IN: 1044 mL    OUT:    Indwelling Catheter - Urethral (mL): 500 mL  Total OUT: 500 mL    Total NET: 544 mL            LABS:                        16.0   5.64  )-----------( 63       ( 06 Feb 2022 04:52 )             47.3     02-06    132<L>  |  91<L>  |  24.9<H>  ----------------------------<  102<H>  4.9   |  28.0  |  1.07    Ca    8.8      06 Feb 2022 14:34  Phos  4.0     02-06  Mg     2.0     02-06    TPro  5.5<L>  /  Alb  3.2<L>  /  TBili  4.8<H>  /  DBili  x   /  AST  306<H>  /  ALT  679<H>  /  AlkPhos  128<H>  02-06          CAPILLARY BLOOD GLUCOSE        PT/INR - ( 05 Feb 2022 05:07 )   PT: 19.8 sec;   INR: 1.75 ratio         PTT - ( 06 Feb 2022 04:52 )  PTT:73.2 sec    CULTURES:      Physical Examination:  GENERAL: In NAD   HEENT: NC/AT  NECK: Supple  PULM: CTA anteriorly  CVS: +S1, S2  ABD: Soft, non-tender  EXT: No significant pedal edema  SKIN: Warm and well perfused, no rashes noted.  NEURO: Grossly non-focal    DEVICES:     RADIOLOGY:  < from: Xray Chest 1 View-PORTABLE IMMEDIATE (Xray Chest 1 View-PORTABLE IMMEDIATE .) (02.06.22 @ 08:36) >  ACC: [id number] EXAM:  XR CHEST PORTABLE IMMED 1V                          PROCEDURE DATE:  02/06/2022          INTERPRETATION:  CLINICAL STATEMENT: Heart failure. Shortness of breath.    TECHNIQUE: AP view of the chest.      COMPARISON: 2/2/2022    Interval removal Clyde-Presley catheter.    The heart is enlarged, may be exaggerated by AP technique. Hazy opacity   right lower lung and left retrocardiac opacity; effusions, atelectasis   and/or infiltrate.    Degenerative changes of the spine, shoulders and AC joints.    IMPRESSION:    Interval removal Clyde-Presley catheter.    Hazy opacity right lower lung and left retrocardiac opacity; effusions,   atelectasis and/or infiltrate.    --- End of Report ---            SAMI SHABAZZ MD; Attending Radiologist  This document has been electronically signed. Feb 6 2022 10:52AM    < end of copied text >

## 2022-02-07 NOTE — PHYSICAL THERAPY INITIAL EVALUATION ADULT - CRITERIA FOR SKILLED THERAPEUTIC INTERVENTIONS
home with least restrictive device, Home PT vs Outpatient PT./impairments found/anticipated discharge recommendation

## 2022-02-07 NOTE — PHYSICAL THERAPY INITIAL EVALUATION ADULT - PERTINENT HX OF CURRENT PROBLEM, REHAB EVAL
81M PMH EtOH abuse, HTN, and macular degeneration who [place] [facility] [place] c/o weakness, dyspnea on exertion, intermittent chest pain, and decreased PO intake Underwent cardiac catheterization which was revealing of EF 10-15% and 70% mid LAD occlusion. IABP placed. [place] tejas catheter inserted. Transferred to [facility], course has been complicated by cardiogenic shock, Acute renal failure and AFib with RVR.

## 2022-02-07 NOTE — PROGRESS NOTE ADULT - SUBJECTIVE AND OBJECTIVE BOX
Pt stable overnight; sleepy this AM, otherwise denies complaint. Tele w/ sinus rhythm, intact conduction; no significant/high risk carter; brief salvos WCT - NSVT vs NSAT w/ aberrancy, no sustained arrhythmias. Tolerating metoprolol 25mg BID.     MEDICATIONS  (STANDING):  calcium gluconate IVPB 1 Gram(s) IV Intermittent every 6 hours  chlorhexidine 2% Cloths 1 Application(s) Topical daily  enoxaparin Injectable 80 milliGRAM(s) SubCutaneous two times a day  folic acid 1 milliGRAM(s) Oral daily  furosemide   Injectable 40 milliGRAM(s) IV Push daily  metoprolol tartrate 25 milliGRAM(s) Oral two times a day  multivitamin 1 Tablet(s) Oral daily  thiamine 100 milliGRAM(s) Oral daily    Allergies  No Known Allergies    Vital Signs Last 24 Hrs  T(C): 36.4 (07 Feb 2022 07:41), Max: 37.1 (06 Feb 2022 15:45)  T(F): 97.6 (07 Feb 2022 07:41), Max: 98.7 (06 Feb 2022 15:45)  HR: 70 (07 Feb 2022 11:00) (58 - 92)  BP: 82/58 (07 Feb 2022 11:00) (82/58 - 132/58)  BP(mean): 66 (07 Feb 2022 11:00) (66 - 88)  RR: 22 (07 Feb 2022 11:00) (8 - 29)  SpO2: 97% (07 Feb 2022 11:00) (84% - 100%)    Physical Exam:  Constitutional: NAD, drowsy, stoic, A&Ox3  Cardiovascular: +S1S2 RRR  Pulmonary: CTA b/l, unlabored  GI: soft NTND +BS  Extremities: no edema, +distal pulses b/l  Neuro: non focal, ACKERMAN x4    LABS:                      15.5   6.59  )-----------( 66       ( 07 Feb 2022 04:57 )             45.7     131<L>  |  89<L>  |  27.7<H>  ----------------------------<  104<H>  4.6   |  24.0  |  1.19    Ca    9.2      07 Feb 2022 04:57  Phos  4.5     02-07  Mg     2.3     02-07    TPro  6.4<L>  /  Alb  3.7  /  TBili  5.8<H>  /  DBili  x   /  AST  259<H>  /  ALT  637<H>  /  AlkPhos  140<H>  02-07    PTT - ( 06 Feb 2022 04:52 )  PTT:73.2 sec      RADIOLOGY & ADDITIONAL TESTS:  < from: TTE Echo Complete w/o Contrast w/ Doppler (02.03.22 @ 11:01) >  PHYSICIAN INTERPRETATION:  Left Ventricle: Endocardial visualization was enhanced with intravenous   echo contrast. The left ventricular internal cavity size is mildly   increased. Left ventricular wall thickness is normal.  Global LV systolic function was severely decreased. Left ventricular   ejection fraction, by visual estimation, is 20 to 25%. Spectral Doppler   shows impaired relaxation pattern of left ventricular myocardial filling   (Grade I diastolic dysfunction).  Right Ventricle: The right ventricular size is mildly enlarged. RV   systolic function is normal.  Left Atrium: The left atrium is normal in size.  Right Atrium: The right atrium is normal in size.  Pericardium: Trivial pericardial effusion is present.  Mitral Valve: The mitral valve leaflets are tethered whichis due to   reduced systolic function and elevated LVDP. There is mild mitral annular   calcification. Mild to moderate mitral valve regurgitation is seen.  Tricuspid Valve: The tricuspid valve is normal in structure. Mild   tricuspid regurgitation is visualized. Estimated pulmonary artery   systolic pressure is 42.0 mmHg assuming a right atrial pressure of 15   mmHg, which is consistent with mild pulmonary hypertension.  Aortic Valve: The aortic valve was not well visualized. Sclerotic aortic   valve with normal opening. Mild aortic valve regurgitation is seen.  Pulmonic Valve: The pulmonic valve was not well visualized. Trace   pulmonic valve regurgitation.  Aorta: The aortic root is normal in size and structure.  Pulmonary Artery: The pulmonary artery is not well seen.  Venous: The inferior vena cava was dilated, with respiratory size   variation less than 50%.  Additional Comments: A venous catheter is visualized in the right atrium   and right ventricle.  In comparison to the previous echocardiogram(s): There are no prior   studies on this patient for comparison purposes.      Summary:   1. Technically difficult study.   2. Left ventricular ejection fraction, by visual estimation, is 20 to   25%.   3. Severely decreased global left ventricular systolic function.   4. Spectral Doppler shows impaired relaxation pattern of left   ventricular myocardial filling (Grade I diastolic dysfunction).   5. Mildly enlarged right ventricle.   6. The left atrium is normal in size.   7. Mildmitral annular calcification.   8. Mild to moderate mitral valve regurgitation.   9. The mitral valve leaflets are tethered which is due to reduced   systolic function and elevated LVDP.  10. Mild tricuspid regurgitation.  11. Estimated pulmonary artery systolic pressure is 42.0 mmHg assuming a   right atrial pressure of 15 mmHg, which is consistent with mild pulmonary   hypertension.  12. Mild aortic regurgitation.  13. Sclerotic aortic valve with normal opening.  14. Trivial pericardial effusion.  15. Endocardial visualization was enhanced with intravenous echo contrast.  16. Pulse wave Doppler in the descending aorta is consistent with IABP in   place.  17. There are no prior studies on this patient for comparison purposes.    Mounika Huerta D.O. Electronically signed on 2/3/2022 at 4:14:35 PM    *** Final ***    < end of copied text >

## 2022-02-07 NOTE — PROGRESS NOTE ADULT - PROBLEM SELECTOR PLAN 1
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 Recommend RHC/[place] placement for further assessment.  GDMT: Start Aldactone 25mg daily. Hold further diuretics for now.  Low sodium and 1.5L FR diet

## 2022-02-08 LAB
ANION GAP SERPL CALC-SCNC: 12 MMOL/L — SIGNIFICANT CHANGE UP (ref 5–17)
APTT BLD: 38.6 SEC — HIGH (ref 27.5–35.5)
BUN SERPL-MCNC: 24.9 MG/DL — HIGH (ref 8–20)
CA-I BLD-SCNC: 1.14 MMOL/L — LOW (ref 1.15–1.33)
CALCIUM SERPL-MCNC: 9 MG/DL — SIGNIFICANT CHANGE UP (ref 8.6–10.2)
CHLORIDE SERPL-SCNC: 97 MMOL/L — LOW (ref 98–107)
CO2 SERPL-SCNC: 26 MMOL/L — SIGNIFICANT CHANGE UP (ref 22–29)
CREAT SERPL-MCNC: 0.67 MG/DL — SIGNIFICANT CHANGE UP (ref 0.5–1.3)
GLUCOSE SERPL-MCNC: 102 MG/DL — HIGH (ref 70–99)
HCT VFR BLD CALC: 43.2 % — SIGNIFICANT CHANGE UP (ref 39–50)
HGB BLD-MCNC: 14.6 G/DL — SIGNIFICANT CHANGE UP (ref 13–17)
INR BLD: 1.53 RATIO — HIGH (ref 0.88–1.16)
MAGNESIUM SERPL-MCNC: 1.5 MG/DL — LOW (ref 1.6–2.6)
MCHC RBC-ENTMCNC: 31.1 PG — SIGNIFICANT CHANGE UP (ref 27–34)
MCHC RBC-ENTMCNC: 33.8 GM/DL — SIGNIFICANT CHANGE UP (ref 32–36)
MCV RBC AUTO: 91.9 FL — SIGNIFICANT CHANGE UP (ref 80–100)
PHOSPHATE SERPL-MCNC: 5.4 MG/DL — HIGH (ref 2.4–4.7)
PLATELET # BLD AUTO: 62 K/UL — LOW (ref 150–400)
POTASSIUM SERPL-MCNC: 3.9 MMOL/L — SIGNIFICANT CHANGE UP (ref 3.5–5.3)
POTASSIUM SERPL-SCNC: 3.9 MMOL/L — SIGNIFICANT CHANGE UP (ref 3.5–5.3)
PROTHROM AB SERPL-ACNC: 17.4 SEC — HIGH (ref 10.6–13.6)
RBC # BLD: 4.7 M/UL — SIGNIFICANT CHANGE UP (ref 4.2–5.8)
RBC # FLD: 13 % — SIGNIFICANT CHANGE UP (ref 10.3–14.5)
SODIUM SERPL-SCNC: 135 MMOL/L — SIGNIFICANT CHANGE UP (ref 135–145)
WBC # BLD: 4.6 K/UL — SIGNIFICANT CHANGE UP (ref 3.8–10.5)
WBC # FLD AUTO: 4.6 K/UL — SIGNIFICANT CHANGE UP (ref 3.8–10.5)

## 2022-02-08 PROCEDURE — 99233 SBSQ HOSP IP/OBS HIGH 50: CPT

## 2022-02-08 RX ORDER — SENNA PLUS 8.6 MG/1
2 TABLET ORAL AT BEDTIME
Refills: 0 | Status: DISCONTINUED | OUTPATIENT
Start: 2022-02-08 | End: 2022-02-10

## 2022-02-08 RX ORDER — POLYETHYLENE GLYCOL 3350 17 G/17G
17 POWDER, FOR SOLUTION ORAL DAILY
Refills: 0 | Status: DISCONTINUED | OUTPATIENT
Start: 2022-02-08 | End: 2022-02-10

## 2022-02-08 RX ORDER — MAGNESIUM SULFATE 500 MG/ML
2 VIAL (ML) INJECTION ONCE
Refills: 0 | Status: COMPLETED | OUTPATIENT
Start: 2022-02-08 | End: 2022-02-08

## 2022-02-08 RX ORDER — METOPROLOL TARTRATE 50 MG
25 TABLET ORAL ONCE
Refills: 0 | Status: COMPLETED | OUTPATIENT
Start: 2022-02-08 | End: 2022-02-08

## 2022-02-08 RX ORDER — SPIRONOLACTONE 25 MG/1
25 TABLET, FILM COATED ORAL DAILY
Refills: 0 | Status: DISCONTINUED | OUTPATIENT
Start: 2022-02-08 | End: 2022-02-10

## 2022-02-08 RX ORDER — METOPROLOL TARTRATE 50 MG
50 TABLET ORAL DAILY
Refills: 0 | Status: DISCONTINUED | OUTPATIENT
Start: 2022-02-09 | End: 2022-02-10

## 2022-02-08 RX ADMIN — SENNA PLUS 2 TABLET(S): 8.6 TABLET ORAL at 22:10

## 2022-02-08 RX ADMIN — ENOXAPARIN SODIUM 80 MILLIGRAM(S): 100 INJECTION SUBCUTANEOUS at 05:08

## 2022-02-08 RX ADMIN — ENOXAPARIN SODIUM 80 MILLIGRAM(S): 100 INJECTION SUBCUTANEOUS at 17:38

## 2022-02-08 RX ADMIN — Medication 25 GRAM(S): at 06:53

## 2022-02-08 RX ADMIN — CHLORHEXIDINE GLUCONATE 1 APPLICATION(S): 213 SOLUTION TOPICAL at 13:06

## 2022-02-08 RX ADMIN — Medication 25 MILLIGRAM(S): at 17:39

## 2022-02-08 RX ADMIN — Medication 100 GRAM(S): at 00:30

## 2022-02-08 RX ADMIN — Medication 1 MILLIGRAM(S): at 12:14

## 2022-02-08 RX ADMIN — SPIRONOLACTONE 25 MILLIGRAM(S): 25 TABLET, FILM COATED ORAL at 16:40

## 2022-02-08 RX ADMIN — Medication 25 MILLIGRAM(S): at 05:07

## 2022-02-08 RX ADMIN — Medication 100 GRAM(S): at 05:07

## 2022-02-08 RX ADMIN — Medication 1 TABLET(S): at 12:14

## 2022-02-08 RX ADMIN — Medication 100 MILLIGRAM(S): at 12:14

## 2022-02-08 RX ADMIN — POLYETHYLENE GLYCOL 3350 17 GRAM(S): 17 POWDER, FOR SOLUTION ORAL at 12:14

## 2022-02-08 NOTE — PROGRESS NOTE ADULT - SUBJECTIVE AND OBJECTIVE BOX
Pt stable overnight without event. s/p RHC 2/7/22 showing LOW filling pressures - received 500cc IVF & lactate improved from 3.4 to 1.5. Downgraded to telemetry bed. Remains in sinus rhythm with intact conduction; no significant/high risk carter; continues to have brief salvos WCT - NSVT vs NSAT w/ aberrancy, no sustained arrhythmias. Tolerating metoprolol 25mg BID.     MEDICATIONS  (STANDING):  chlorhexidine 2% Cloths 1 Application(s) Topical daily  enoxaparin Injectable 80 milliGRAM(s) SubCutaneous two times a day  folic acid 1 milliGRAM(s) Oral daily  metoprolol tartrate 25 milliGRAM(s) Oral two times a day  multivitamin 1 Tablet(s) Oral daily  polyethylene glycol 3350 17 Gram(s) Oral daily  senna 2 Tablet(s) Oral at bedtime  thiamine 100 milliGRAM(s) Oral daily      Allergies  No Known Allergies      Vital Signs Last 24 Hrs  T(C): 36.6 (08 Feb 2022 11:28), Max: 37.6 (08 Feb 2022 08:05)  T(F): 97.8 (08 Feb 2022 11:28), Max: 99.7 (08 Feb 2022 08:05)  HR: 85 (08 Feb 2022 11:28) (62 - 102)  BP: 90/43 (08 Feb 2022 11:28) (80/71 - 106/80)  BP(mean): 90 (08 Feb 2022 08:00) (71 - 90)  RR: 17 (08 Feb 2022 11:28) (12 - 27)  SpO2: 95% (08 Feb 2022 11:28) (89% - 100%)    Physical Exam:  Constitutional: NAD, AAOx3  Cardiovascular: +S1S2 RRR, occ ectopy  Pulmonary: CTA b/l, unlabored  GI: soft NTND +BS  Extremities: no pedal edema, +distal pulses b/l  Neuro: non focal, ACKERMAN x4    LABS:                      14.6   4.60  )-----------( 62       ( 08 Feb 2022 04:31 )             43.2     135  |  97<L>  |  24.9<H>  ----------------------------<  102<H>  3.9   |  26.0  |  0.67    Ca    9.0      08 Feb 2022 04:31  Phos  5.4     02-08  Mg     1.5     02-08    TPro  6.4<L>  /  Alb  3.7  /  TBili  5.8<H>  /  DBili  x   /  AST  259<H>  /  ALT  637<H>  /  AlkPhos  140<H>  02-07    PT/INR - ( 08 Feb 2022 04:31 )   PT: 17.4 sec;   INR: 1.53 ratio      PTT - ( 08 Feb 2022 04:31 )  PTT:38.6 sec    RADIOLOGY & ADDITIONAL TESTS:  < from: TTE Echo Complete w/o Contrast w/ Doppler (02.03.22 @ 11:01) >  PHYSICIAN INTERPRETATION:  Left Ventricle: Endocardial visualization was enhanced with intravenous   echo contrast. The left ventricular internal cavity size is mildly   increased. Left ventricular wall thickness is normal.  Global LV systolic function was severely decreased. Left ventricular   ejection fraction, by visual estimation, is 20 to 25%. Spectral Doppler   shows impaired relaxation pattern of left ventricular myocardial filling   (Grade I diastolic dysfunction).  Right Ventricle: The right ventricular size is mildly enlarged. RV   systolic function is normal.  Left Atrium: The left atrium is normal in size.  Right Atrium: The right atrium is normal in size.  Pericardium: Trivial pericardial effusion is present.  Mitral Valve: The mitral valve leaflets are tethered whichis due to   reduced systolic function and elevated LVDP. There is mild mitral annular   calcification. Mild to moderate mitral valve regurgitation is seen.  Tricuspid Valve: The tricuspid valve is normal in structure. Mild   tricuspid regurgitation is visualized. Estimated pulmonary artery   systolic pressure is 42.0 mmHg assuming a right atrial pressure of 15   mmHg, which is consistent with mild pulmonary hypertension.  Aortic Valve: The aortic valve was not well visualized. Sclerotic aortic   valve with normal opening. Mild aortic valve regurgitation is seen.  Pulmonic Valve: The pulmonic valve was not well visualized. Trace   pulmonic valve regurgitation.  Aorta: The aortic root is normal in size and structure.  Pulmonary Artery: The pulmonary artery is not well seen.  Venous: The inferior vena cava was dilated, with respiratory size   variation less than 50%.  Additional Comments: A venous catheter is visualized in the right atrium   and right ventricle.  In comparison to the previous echocardiogram(s): There are no prior   studies on this patient for comparison purposes.      Summary:   1. Technically difficult study.   2. Left ventricular ejection fraction, by visual estimation, is 20 to   25%.   3. Severely decreased global left ventricular systolic function.   4. Spectral Doppler shows impaired relaxation pattern of left   ventricular myocardial filling (Grade I diastolic dysfunction).   5. Mildly enlarged right ventricle.   6. The left atrium is normal in size.   7. Mildmitral annular calcification.   8. Mild to moderate mitral valve regurgitation.   9. The mitral valve leaflets are tethered which is due to reduced   systolic function and elevated LVDP.  10. Mild tricuspid regurgitation.  11. Estimated pulmonary artery systolic pressure is 42.0 mmHg assuming a   right atrial pressure of 15 mmHg, which is consistent with mild pulmonary   hypertension.  12. Mild aortic regurgitation.  13. Sclerotic aortic valve with normal opening.  14. Trivial pericardial effusion.  15. Endocardial visualization was enhanced with intravenous echo contrast.  16. Pulse wave Doppler in the descending aorta is consistent with IABP in   place.  17. There are no prior studies on this patient for comparison purposes.    Mounika Huerta D.O. Electronically signed on 2/3/2022 at 4:14:35 PM    *** Final ***    < end of copied text >    Cath reports pending.  Pt stable overnight without event. s/p RHC 2/7/22 showing LOW filling pressures - received 500cc IVF & lactate improved from 3.4 to 1.5. Downgraded to telemetry bed. Remains in sinus rhythm with intact conduction; no significant/high risk carter; continues to have brief salvos WCT - NSVT vs NSAT w/ aberrancy, no sustained arrhythmias. Tolerating metoprolol 25mg BID.     MEDICATIONS  (STANDING):  chlorhexidine 2% Cloths 1 Application(s) Topical daily  enoxaparin Injectable 80 milliGRAM(s) SubCutaneous two times a day  folic acid 1 milliGRAM(s) Oral daily  metoprolol tartrate 25 milliGRAM(s) Oral two times a day  multivitamin 1 Tablet(s) Oral daily  polyethylene glycol 3350 17 Gram(s) Oral daily  senna 2 Tablet(s) Oral at bedtime  thiamine 100 milliGRAM(s) Oral daily      Allergies  No Known Allergies      Vital Signs Last 24 Hrs  T(C): 36.6 (08 Feb 2022 11:28), Max: 37.6 (08 Feb 2022 08:05)  T(F): 97.8 (08 Feb 2022 11:28), Max: 99.7 (08 Feb 2022 08:05)  HR: 85 (08 Feb 2022 11:28) (62 - 102)  BP: 90/43 (08 Feb 2022 11:28) (80/71 - 106/80)  BP(mean): 90 (08 Feb 2022 08:00) (71 - 90)  RR: 17 (08 Feb 2022 11:28) (12 - 27)  SpO2: 95% (08 Feb 2022 11:28) (89% - 100%)    Physical Exam:  Constitutional: NAD, AAOx3  Cardiovascular: +S1S2 RRR, occ ectopy  Pulmonary: CTA b/l, unlabored  GI: soft NTND +BS  Extremities: no pedal edema, +distal pulses b/l  Neuro: non focal, ACKERMAN x4    LABS:                      14.6   4.60  )-----------( 62       ( 08 Feb 2022 04:31 )             43.2     135  |  97<L>  |  24.9<H>  ----------------------------<  102<H>  3.9   |  26.0  |  0.67    Ca    9.0      08 Feb 2022 04:31  Phos  5.4     02-08  Mg     1.5     02-08    TPro  6.4<L>  /  Alb  3.7  /  TBili  5.8<H>  /  DBili  x   /  AST  259<H>  /  ALT  637<H>  /  AlkPhos  140<H>  02-07    PT/INR - ( 08 Feb 2022 04:31 )   PT: 17.4 sec;   INR: 1.53 ratio      PTT - ( 08 Feb 2022 04:31 )  PTT:38.6 sec    RADIOLOGY & ADDITIONAL TESTS:  < from: TTE Echo Complete w/o Contrast w/ Doppler (02.03.22 @ 11:01) >  PHYSICIAN INTERPRETATION:  Left Ventricle: Endocardial visualization was enhanced with intravenous   echo contrast. The left ventricular internal cavity size is mildly   increased. Left ventricular wall thickness is normal.  Global LV systolic function was severely decreased. Left ventricular   ejection fraction, by visual estimation, is 20 to 25%. Spectral Doppler   shows impaired relaxation pattern of left ventricular myocardial filling   (Grade I diastolic dysfunction).  Right Ventricle: The right ventricular size is mildly enlarged. RV   systolic function is normal.  Left Atrium: The left atrium is normal in size.  Right Atrium: The right atrium is normal in size.  Pericardium: Trivial pericardial effusion is present.  Mitral Valve: The mitral valve leaflets are tethered whichis due to   reduced systolic function and elevated LVDP. There is mild mitral annular   calcification. Mild to moderate mitral valve regurgitation is seen.  Tricuspid Valve: The tricuspid valve is normal in structure. Mild   tricuspid regurgitation is visualized. Estimated pulmonary artery   systolic pressure is 42.0 mmHg assuming a right atrial pressure of 15   mmHg, which is consistent with mild pulmonary hypertension.  Aortic Valve: The aortic valve was not well visualized. Sclerotic aortic   valve with normal opening. Mild aortic valve regurgitation is seen.  Pulmonic Valve: The pulmonic valve was not well visualized. Trace   pulmonic valve regurgitation.  Aorta: The aortic root is normal in size and structure.  Pulmonary Artery: The pulmonary artery is not well seen.  Venous: The inferior vena cava was dilated, with respiratory size   variation less than 50%.  Additional Comments: A venous catheter is visualized in the right atrium   and right ventricle.  In comparison to the previous echocardiogram(s): There are no prior   studies on this patient for comparison purposes.      Summary:   1. Technically difficult study.   2. Left ventricular ejection fraction, by visual estimation, is 20 to 25%.   3. Severely decreased global left ventricular systolic function.   4. Spectral Doppler shows impaired relaxation pattern of left   ventricular myocardial filling (Grade I diastolic dysfunction).   5. Mildly enlarged right ventricle.   6. The left atrium is normal in size.   7. Mildmitral annular calcification.   8. Mild to moderate mitral valve regurgitation.   9. The mitral valve leaflets are tethered which is due to reduced   systolic function and elevated LVDP.  10. Mild tricuspid regurgitation.  11. Estimated pulmonary artery systolic pressure is 42.0 mmHg assuming a   right atrial pressure of 15 mmHg, which is consistent with mild pulmonary   hypertension.  12. Mild aortic regurgitation.  13. Sclerotic aortic valve with normal opening.  14. Trivial pericardial effusion.  15. Endocardial visualization was enhanced with intravenous echo contrast.  16. Pulse wave Doppler in the descending aorta is consistent with IABP in   place.  17. There are no prior studies on this patient for comparison purposes.    Mounika Huerta D.O. Electronically signed on 2/3/2022 at 4:14:35 PM    *** Final ***    < end of copied text >    Cath reports pending.

## 2022-02-08 NOTE — PROGRESS NOTE ADULT - TIME BILLING
reviewing labs, notes, orders, radiographic studies, as well as counseling and coordinating care with the relevant multidisciplinary team, including with the primary and consulting providers.
as above.
- Generation of cardiovascular treatment plan.  - Coordination of care with primary team.
reviewing labs, notes, orders, radiographic studies, as well as counseling and coordinating care with the relevant multidisciplinary team, including with the primary and consulting providers.
- Generation of cardiovascular treatment plan.  - Coordination of care with primary team.
- Generation of cardiovascular treatment plan.  - Coordination of care with primary team.

## 2022-02-08 NOTE — PROGRESS NOTE ADULT - ASSESSMENT
81M PMH EtOH abuse, HTN, and macular degeneration who [place] [facility] [place] c/o weakness, dyspnea on exertion, intermittent chest pain, and decreased PO intake. Pt was instructed to go to the ER after outpatient EKG revealed a flutter w/ RVR. Underwent cardiac catheterization which was revealing of EF 10-15% and 70% mid LAD occlusion. IABP placed. Justice presley catheter inserted. Cardiac index 1.8. Milrinone infusion initiated at 0.125mcg. Pt was transferred to [facility] for continuation of care where his course has been complicated by cardiogenic shock and with IV vasopressors added for hemodynamic support, acute renal failure, and AFib with RVR. Was given Dofetilide but held given QTc prolongation, and was given IV Magnesium. Pt was weaned off Milrinone and Levophed. He is now downgraded to medical floor.     Cardiogenic shock/ HFrEF  S/p repeat RHC 2/7 with low filling pressures  tte EF 20-25%  - off Milrinone  - S/p IABP, s/p Justice-Presley catheter  - Lasix IV held given elevated lactate; was given 500cc IVF and lactate improved from 3.4 to 1.5  - Monitor Is/Os closely  - CHF team on board  - Continue metoprolol   - Will need life vest prior to dc    Transaminitis due to shock liver, improving  - trend LFTs    QTc prolongation  - Serial EKG; continue to trend  - S/p IV Mg    Hypomagnesemia  - replaced  - recheck in AM    AFib with RVR  - S/p Dofetilide, held given QTc prolongation  - Lopressor 25mg PO BID  - Monitor rhythm on telemetry  - Full Dose Lovenox 80mg BID, Transition to Eliquis prior to dc when no additional procedures confirmed.     JOON, Resolved  - Monitor UOP, Is/Os, renally adjust medications    Thrombocytopenia likely due to ETOH use  - monitor plt counts  - hold Lovenox if less then 50    EtOH - not actively withdrawing  - Thiamine, folic acid, MVI      Dispo: tele bed, seen by PT rec home with PT

## 2022-02-08 NOTE — PROGRESS NOTE ADULT - PROBLEM SELECTOR PROBLEM 3
CAD (coronary artery disease), native coronary artery

## 2022-02-08 NOTE — PROGRESS NOTE ADULT - SUBJECTIVE AND OBJECTIVE BOX
Patient is a 81y old  Male who presents with a chief complaint of Cardiogenic shock (07 Feb 2022 14:18)      BRIEF HOSPITAL COURSE:   81M PMH EtOH abuse, HTN, and macular degeneration who presented to [facility] ER on 2/1 c/o weakness, dyspnea on exertion, intermittent chest pain, and decreased PO intake. Pt was instructed to go to the ER after outpatient EKG revealed a flutter w/ RVR. Underwent cardiac catheterization which was revealing of EF 10-15% and 70% mid LAD occlusion. IABP placed. Clear[place] presley catheter inserted. Cardiac index 1.8. Milrinone infusion initiated at 0.125mcg. Pt was transferred to [facility] for continuation of care where his course has been complicated by cardiogenic shock and with IV vasopressors added for hemodynamic support, acute renal failure, and AFib with RVR. Was given Dofetilide but held given QTc prolongation, and was given IV Magnesium. Pt was weaned off Milrinone and Levophed.      PAST MEDICAL & SURGICAL HISTORY:  ETOH abuse    HTN (hypertension)    Macular degeneration          Medications:    metoprolol tartrate 25 milliGRAM(s) Oral two times a day          enoxaparin Injectable 80 milliGRAM(s) SubCutaneous two times a day          calcium gluconate IVPB 1 Gram(s) IV Intermittent every 6 hours  folic acid 1 milliGRAM(s) Oral daily  multivitamin 1 Tablet(s) Oral daily  thiamine 100 milliGRAM(s) Oral daily      chlorhexidine 2% Cloths 1 Application(s) Topical daily            ICU Vital Signs Last 24 Hrs  T(C): 36.7 (07 Feb 2022 23:03), Max: 36.7 (07 Feb 2022 11:58)  T(F): 98 (07 Feb 2022 23:03), Max: 98 (07 Feb 2022 11:58)  HR: 81 (07 Feb 2022 21:00) (58 - 92)  BP: 91/79 (07 Feb 2022 21:00) (80/71 - 120/62)  BP(mean): 85 (07 Feb 2022 21:00) (66 - 88)  ABP: --  ABP(mean): --  RR: 16 (07 Feb 2022 21:00) (8 - 29)  SpO2: 98% (07 Feb 2022 21:00) (84% - 100%)      ABG - ( 06 Feb 2022 09:08 )  pH, Arterial: 7.560 pH, Blood: x     /  pCO2: 32    /  pO2: 85    / HCO3: 29    / Base Excess: 6.5   /  SaO2: 98.7                I&O's Detail    06 Feb 2022 07:01  -  07 Feb 2022 07:00  --------------------------------------------------------  IN:    IV PiggyBack: 300 mL    IV PiggyBack: 200 mL    Oral Fluid: 844 mL  Total IN: 1344 mL    OUT:    Indwelling Catheter - Urethral (mL): 1050 mL  Total OUT: 1050 mL    Total NET: 294 mL      07 Feb 2022 07:01  -  08 Feb 2022 00:03  --------------------------------------------------------  IN:    IV PiggyBack: 100 mL  Total IN: 100 mL    OUT:    Indwelling Catheter - Urethral (mL): 1340 mL    Voided (mL): 175 mL  Total OUT: 1515 mL    Total NET: -1415 mL            LABS:                        15.5   6.59  )-----------( 66       ( 07 Feb 2022 04:57 )             45.7     02-07    131<L>  |  89<L>  |  27.7<H>  ----------------------------<  104<H>  4.6   |  24.0  |  1.19    Ca    9.2      07 Feb 2022 04:57  Phos  4.5     02-07  Mg     2.3     02-07    TPro  6.4<L>  /  Alb  3.7  /  TBili  5.8<H>  /  DBili  x   /  AST  259<H>  /  ALT  637<H>  /  AlkPhos  140<H>  02-07          CAPILLARY BLOOD GLUCOSE        PTT - ( 06 Feb 2022 04:52 )  PTT:73.2 sec    CULTURES:      Physical Examination:  GENERAL: In NAD   HEENT: NC/AT  NECK: Supple, trachea midline  PULM: CTA anteriorly  CVS: +S1, S2  ABD: Soft, non-tender  EXT: No pedal edema  SKIN: Warm and well perfused, no rashes noted.  NEURO: Grossly non-focal    DEVICES:     RADIOLOGY:   < from: Xray Chest 1 View-PORTABLE IMMEDIATE (Xray Chest 1 View-PORTABLE IMMEDIATE .) (02.06.22 @ 08:36) >    ACC: [id number] EXAM:  XR CHEST PORTABLE IMMED 1V                          PROCEDURE DATE:  02/06/2022          INTERPRETATION:  CLINICAL STATEMENT: Heart failure. Shortness of breath.    TECHNIQUE: AP view of the chest.      COMPARISON: 2/2/2022    Interval removal Clearwater-Presley catheter.    The heart is enlarged, may be exaggerated by AP technique. Hazy opacity   right lower lung and left retrocardiac opacity; effusions, atelectasis   and/or infiltrate.    Degenerative changes of the spine, shoulders and AC joints.    IMPRESSION:    Interval removal Clearwater-Presley catheter.    Hazy opacity right lower lung and left retrocardiac opacity; effusions,   atelectasis and/or infiltrate.    --- End of Report ---            SAMI SHABAZZ MD; Attending Radiologist  This document has been electronically signed. Feb 6 2022 10:52AM    < end of copied text >        < from: TTE Echo Complete w/o Contrast w/ Doppler (02.03.22 @ 11:01) >  Summary:   1. Technically difficult study.   2. Left ventricular ejection fraction, by visual estimation, is 20 to   25%.   3. Severely decreased global left ventricular systolic function.   4. Spectral Doppler shows impaired relaxation pattern of left   ventricular myocardial filling (Grade I diastolic dysfunction).   5. Mildly enlarged right ventricle.   6. The left atrium is normal in size.   7. Mildmitral annular calcification.   8. Mild to moderate mitral valve regurgitation.   9. The mitral valve leaflets are tethered which is due to reduced   systolic function and elevated LVDP.  10. Mild tricuspid regurgitation.  11. Estimated pulmonary artery systolic pressure is 42.0 mmHg assuming a   right atrial pressure of 15 mmHg, which is consistent with mild pulmonary   hypertension.  12. Mild aortic regurgitation.  13. Sclerotic aortic valve with normal opening.  14. Trivial pericardial effusion.  15. Endocardial visualization was enhanced with intravenous echo contrast.  16. Pulse wave Doppler in the descending aorta is consistent with IABP in   place.  17. There are no prior studies on this patient for comparison purposes.    Mounika Osborne D.O. Electronically signed on 2/3/2022 at 4:14:35 PM            *** Final ***              MOUNIKA OSBORNE   This document has been electronically signed. Feb  3 2022  4:14PM    < end of copied text >

## 2022-02-08 NOTE — PROGRESS NOTE ADULT - ASSESSMENT
81 year old male patient with a history of HTN, macular degeneration, and daily ETOH consumption who [place] [facility] [place] acute systolic heart failure (non-ischemic), EF 10-15%, and new onset atrial flutter/fibrillation (CHADSVASC: 5) with RVR. Underwent cardiac catheterization which demonstrated  of EF 10-15% and 70% mid LAD occlusion, Cardiac index 1.8. IABP was placed, milrinone initiated and he was transferred to [facility]  for further management of cardiogenic shock. He subsequently spontaneously converted to sinus rhythm, IABP was ultimately d/c'd and he is now weaned off of pressor support and milrinone. Continues to have frequent salvoes of non-sustained AT as well as NSVT, although the burden of ectopy is subjectively decreasing.       1. Atrial flutter  - Currently on Lovenox. Anticipate transition to Eliquis in the near term, when no additional procedures confirmed.   - will consider CTI ablation prior to d/c as rate control is not always feasible for AFL - this can be performed on uninterrupted Eliquis.   - Dofetilide d/c'd due to QTc >500ms. QTc now back to baseline.  - Tolerating metoprolol 25mg BID. Titrate up as tolerated.     2. AFIB  - Counseled on need to decrease alcohol us   - CHADS-VASC = 5 (age x 2, HTN, HF, CAD). Long term a/c as above.   - BB as above    3. Frequent ectopy  - Neligh subjectively improving, but remains quite frequent  - BB will also be beneficial. Increase as tolerated.   - no need for acute intervention    4. Bradycardia and intermittent/occasional drop P waves.  - most c/w non conducted PACs  - No evidence of AVB/high risk pauses  - No current indication for pacing support    5. HF/cardiogenic shock  - off IABP, milrinone and Levo.   - Cardiology, HF and ICU managing  - initiate GDMT - start w/ BB as above.   - Will consider LifeVest prior to DC and r/a EF in 3 months after rhythm control, alcohol abstinence and GDMT before advising for ICD    Will d/w Dr. Frausto; further recs to follow.

## 2022-02-08 NOTE — PROGRESS NOTE ADULT - SUBJECTIVE AND OBJECTIVE BOX
[facility] Division of Hospital Medicine    Chief Complaint:  cardiogenic shock    SUBJECTIVE / OVERNIGHT EVENTS: Patient seen and examined in MICU. No complaints. Eating well. No chest pain and no shortness of breath. Has not had a BM in 5 days. Denies nausea, vomiting and abdominal pain.     Patient denies chest pain, SOB, abd pain, N/V, fever, chills, dysuria or any other complaints. All remainder ROS negative.     MEDICATIONS  (STANDING):  chlorhexidine 2% Cloths 1 Application(s) Topical daily  enoxaparin Injectable 80 milliGRAM(s) SubCutaneous two times a day  folic acid 1 milliGRAM(s) Oral daily  metoprolol tartrate 25 milliGRAM(s) Oral two times a day  multivitamin 1 Tablet(s) Oral daily  thiamine 100 milliGRAM(s) Oral daily    MEDICATIONS  (PRN):        I&O's Summary    07 Feb 2022 07:01  -  08 Feb 2022 07:00  --------------------------------------------------------  IN: 200 mL / OUT: 2015 mL / NET: -1815 mL        PHYSICAL EXAM:  Vital Signs Last 24 Hrs  T(C): 37.6 (08 Feb 2022 08:05), Max: 37.6 (08 Feb 2022 08:05)  T(F): 99.7 (08 Feb 2022 08:05), Max: 99.7 (08 Feb 2022 08:05)  HR: 88 (08 Feb 2022 08:00) (58 - 102)  BP: 106/80 (08 Feb 2022 08:00) (80/71 - 120/62)  BP(mean): 90 (08 Feb 2022 08:00) (66 - 90)  RR: 27 (08 Feb 2022 08:00) (12 - 27)  SpO2: 90% (08 Feb 2022 08:00) (89% - 100%)        CONSTITUTIONAL: NAD  ENMT: Moist oral mucosa, no pharyngeal injection or exudates  RESPIRATORY: Normal respiratory effort; lungs are clear to auscultation bilaterally  CARDIOVASCULAR: Regular rate and rhythm, normal S1 and S2, No lower extremity edema;  ABDOMEN: Nontender to palpation, normoactive bowel sounds, no rebound/guarding;   MUSCLOSKELETAL:  no clubbing or cyanosis of digits; no joint swelling or tenderness to palpation  PSYCH: A+O to person, place, and time; affect appropriate  NEUROLOGY: CN 2-12 are intact and symmetric; no gross sensory deficits;   SKIN: No rashes; no palpable lesions    LABS:                        14.6   4.60  )-----------( 62       ( 08 Feb 2022 04:31 )             43.2     02-08    135  |  97<L>  |  24.9<H>  ----------------------------<  102<H>  3.9   |  26.0  |  0.67    Ca    9.0      08 Feb 2022 04:31  Phos  5.4     02-08  Mg     1.5     02-08    TPro  6.4<L>  /  Alb  3.7  /  TBili  5.8<H>  /  DBili  x   /  AST  259<H>  /  ALT  637<H>  /  AlkPhos  140<H>  02-07    PT/INR - ( 08 Feb 2022 04:31 )   PT: 17.4 sec;   INR: 1.53 ratio         PTT - ( 08 Feb 2022 04:31 )  PTT:38.6 sec          CAPILLARY BLOOD GLUCOSE            RADIOLOGY & ADDITIONAL TESTS:  Results Reviewed:   Imaging Personally Reviewed:  Electrocardiogram Personally Reviewed:

## 2022-02-08 NOTE — PROGRESS NOTE ADULT - PROBLEM SELECTOR PLAN 1
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

## 2022-02-08 NOTE — PROGRESS NOTE ADULT - SUBJECTIVE AND OBJECTIVE BOX
Subjective: NAEO. Patient doing well    Medications:  MEDICATIONS  (STANDING):  chlorhexidine 2% Cloths 1 Application(s) Topical daily  enoxaparin Injectable 80 milliGRAM(s) SubCutaneous two times a day  folic acid 1 milliGRAM(s) Oral daily  metoprolol tartrate 25 milliGRAM(s) Oral once  multivitamin 1 Tablet(s) Oral daily  polyethylene glycol 3350 17 Gram(s) Oral daily  senna 2 Tablet(s) Oral at bedtime  spironolactone 25 milliGRAM(s) Oral daily  thiamine 100 milliGRAM(s) Oral daily      Vital Signs Last 24 Hours  Vital Signs Last 24 Hrs  T(C): 36.6 (08 Feb 2022 11:28), Max: 37.6 (08 Feb 2022 08:05)  T(F): 97.8 (08 Feb 2022 11:28), Max: 99.7 (08 Feb 2022 08:05)  HR: 85 (08 Feb 2022 11:28) (63 - 102)  BP: 90/43 (08 Feb 2022 11:28) (85/63 - 106/80)  BP(mean): 90 (08 Feb 2022 08:00) (71 - 90)  RR: 17 (08 Feb 2022 11:28) (12 - 27)  SpO2: 95% (08 Feb 2022 11:28) (89% - 100%)    Tele: SR/SB, frequent ectopy APCs/PVCs    Physical Exam:  General: No acute events overnight  Neck: Neck supple. JVP difficult to assess-right dressing in place  Chest: Clear to auscultation bilaterally  CV: RRR. Normal S1 and S2. No murmurs, rub, or gallops. Warm peripherally  PV: No edema, pulses full/equal in all extremities  Abdomen: Soft, non-distended, non-tender  Skin: warm, dry  Neurology: lethargic, arouses to physical stimuli    Labs:                                   14.6   4.60  )-----------( 62       ( 08 Feb 2022 04:31 )             43.2   02-08    135  |  97<L>  |  24.9<H>  ----------------------------<  102<H>  3.9   |  26.0  |  0.67    Ca    9.0      08 Feb 2022 04:31  Phos  5.4     02-08  Mg     1.5     02-08    TPro  6.4<L>  /  Alb  3.7  /  TBili  5.8<H>  /  DBili  x   /  AST  259<H>  /  ALT  637<H>  /  AlkPhos  140<H>  02-07

## 2022-02-08 NOTE — PROGRESS NOTE ADULT - ATTENDING COMMENTS
Agree with above. If no AFL recurrence then may defer ablation to outpatient to allow for combined AFIB/AFL ablation which will be more tolerable when he makes some recovery from the HF stand point

## 2022-02-08 NOTE — PROGRESS NOTE ADULT - PROBLEM SELECTOR PLAN 3
Will eventually need to plan for revascularization
Will eventually need to plan for revascularization
Will eventually need to plan for revascularization as an outpatient
Continue heparin gtt  Will eventually need to plan for revascularization.

## 2022-02-08 NOTE — PROGRESS NOTE ADULT - ASSESSMENT
80 y/o male with PMH significant for HTN, EtOH abuse and glaucoma who presented to [facility] on 2/1 with c/o CP and ORELLANA. He was noted to be in atrial flutter w/ RVR. Pt. underwent RHC/LHC which revealed 70% mLAD lesion and LVEF 10-15% with low CI ~1.8. IABP and PA cath placed and patient transferred to [facility] 2/2 for further management of cardiogenic shock.    2/3/22: Pt hemodynamically stable, warm and well perfused with very good UOP response to diuretics. Would attempt to wean pressors and possibly IABP as well. EP consulted for aflutter/afib.    2/4/22: Low dose Levophed resumed overnight to maintain augmented BP . Negative L, diuretics held today. Plan to remove IABP.       Hemodynamics:    2/3/22: Milrinone 0.125mcg/kg/min HR 98, BP 92/62/81, augmented 100, CVP 7-9, PAP 38/15/22, TD CO/CI 6.3/3.2, BRAN CO/CI 3.7/1.9. MVO2 70% on 1:1 and 72% on 1:3.  2/4/22: Milrinone 0.125mcg/kg/min, Levo 0.05mcg/kg/min , BP 91/70/79, augmented 100, CVP 5, PAP 37/11/19, TD CI 2.2, MVO2 74.7 (IABP 1:1)

## 2022-02-08 NOTE — PROGRESS NOTE ADULT - ASSESSMENT
81M admitted with cardiogenic shock, acute systolic biventricular heart failure, JOON, shock liver, AF with RVR    Impression/Plan:      Cardiogenic shock   S/p repeat RHC 2/7 with low filling pressures  - Weaned off Milrinone  - S/p IABP, s/p [place] catheter  - Lasix IV held given elevated lactate; was given 500cc IVF and lactate improved from 3.4 to 1.5  - Monitor Is/Os closely  - CHF team on board    QTc prolongation  - Serial EKG; continue to trend  - S/p IV Mg    AFib with RVR  - S/p Dofetilide, held given QTc prolongation  - Lopressor 25mg PO BID  - Monitor rhythm on telemetry  - FD Lovenox 80mg BID    JOON  - Resolved  - Monitor UOP, Is/Os, renally adjust medications    EtOH - not actively withdrawing  - Thiamine, folic acid, MVI    F/E/N/PPx/Lines  - Replete for K>4 Mg>2  - DASH diet  - FD Lovenox as above  - PIV    Ethics/Dispo  - Full code  - C/w care in ICU for now given borderline BP       Ken Chowdhury M.D.  Pulmonary & Critical Care Medicine  [facility]  Pulmonary and Sleep Medicine at [place]  [address]  [place] [address]  T: [phone]  F: [phone]   81M admitted with cardiogenic shock, acute systolic biventricular heart failure, JOON, shock liver, AF with RVR    Impression/Plan:      Cardiogenic shock   S/p repeat RHC 2/7 with low filling pressures  - Weaned off Milrinone  - S/p IABP, s/p [place] catheter  - Lasix IV held given elevated lactate; was given 500cc IVF and lactate improved from 3.4 to 1.5  - Monitor Is/Os closely  - CHF team on board    QTc prolongation  - Serial EKG; continue to trend  - S/p IV Mg    AFib with RVR  - S/p Dofetilide, held given QTc prolongation  - Lopressor 25mg PO BID  - Monitor rhythm on telemetry  - FD Lovenox 80mg BID    JOON  - Resolved  - Monitor UOP, Is/Os, renally adjust medications    EtOH - not actively withdrawing  - Thiamine, folic acid, MVI    F/E/N/PPx/Lines  - Replete for K>4 Mg>2  - DASH diet  - FD Lovenox as above  - PIV    Ethics/Dispo  - Full code  - Pt stable for downgrade from ICU    Signed out to hospitalist Dr. Shalonda Chowdhury M.D.  Pulmonary & Critical Care Medicine  [facility]  Pulmonary and Sleep Medicine at [place]  [address]  [place], [place] [address]  T: [phone]  F: [phone]

## 2022-02-09 ENCOUNTER — TRANSCRIPTION ENCOUNTER (OUTPATIENT)
Age: 82
End: 2022-02-09

## 2022-02-09 PROBLEM — F10.10 ALCOHOL ABUSE, UNCOMPLICATED: Chronic | Status: ACTIVE | Noted: 2022-02-02

## 2022-02-09 PROBLEM — I10 ESSENTIAL (PRIMARY) HYPERTENSION: Chronic | Status: ACTIVE | Noted: 2022-02-02

## 2022-02-09 PROBLEM — H35.30 UNSPECIFIED MACULAR DEGENERATION: Chronic | Status: ACTIVE | Noted: 2022-02-02

## 2022-02-09 LAB
ALBUMIN SERPL ELPH-MCNC: 3.1 G/DL — LOW (ref 3.3–5.2)
ALP SERPL-CCNC: 102 U/L — SIGNIFICANT CHANGE UP (ref 40–120)
ALT FLD-CCNC: 295 U/L — HIGH
ANION GAP SERPL CALC-SCNC: 13 MMOL/L — SIGNIFICANT CHANGE UP (ref 5–17)
AST SERPL-CCNC: 77 U/L — HIGH
BASOPHILS # BLD AUTO: 0 K/UL — SIGNIFICANT CHANGE UP (ref 0–0.2)
BASOPHILS NFR BLD AUTO: 0 % — SIGNIFICANT CHANGE UP (ref 0–2)
BILIRUB SERPL-MCNC: 2.7 MG/DL — HIGH (ref 0.4–2)
BUN SERPL-MCNC: 18.3 MG/DL — SIGNIFICANT CHANGE UP (ref 8–20)
CALCIUM SERPL-MCNC: 8.7 MG/DL — SIGNIFICANT CHANGE UP (ref 8.6–10.2)
CHLORIDE SERPL-SCNC: 97 MMOL/L — LOW (ref 98–107)
CO2 SERPL-SCNC: 26 MMOL/L — SIGNIFICANT CHANGE UP (ref 22–29)
CREAT SERPL-MCNC: 0.61 MG/DL — SIGNIFICANT CHANGE UP (ref 0.5–1.3)
EOSINOPHIL # BLD AUTO: 0.05 K/UL — SIGNIFICANT CHANGE UP (ref 0–0.5)
EOSINOPHIL NFR BLD AUTO: 0.9 % — SIGNIFICANT CHANGE UP (ref 0–6)
GIANT PLATELETS BLD QL SMEAR: PRESENT — SIGNIFICANT CHANGE UP
GLUCOSE SERPL-MCNC: 92 MG/DL — SIGNIFICANT CHANGE UP (ref 70–99)
HCT VFR BLD CALC: 46.4 % — SIGNIFICANT CHANGE UP (ref 39–50)
HGB BLD-MCNC: 15.6 G/DL — SIGNIFICANT CHANGE UP (ref 13–17)
LYMPHOCYTES # BLD AUTO: 0.5 K/UL — LOW (ref 1–3.3)
LYMPHOCYTES # BLD AUTO: 8.8 % — LOW (ref 13–44)
MAGNESIUM SERPL-MCNC: 1.4 MG/DL — LOW (ref 1.6–2.6)
MANUAL SMEAR VERIFICATION: SIGNIFICANT CHANGE UP
MCHC RBC-ENTMCNC: 31.1 PG — SIGNIFICANT CHANGE UP (ref 27–34)
MCHC RBC-ENTMCNC: 33.6 GM/DL — SIGNIFICANT CHANGE UP (ref 32–36)
MCV RBC AUTO: 92.4 FL — SIGNIFICANT CHANGE UP (ref 80–100)
MONOCYTES # BLD AUTO: 0.94 K/UL — HIGH (ref 0–0.9)
MONOCYTES NFR BLD AUTO: 16.7 % — HIGH (ref 2–14)
NEUTROPHILS # BLD AUTO: 3.81 K/UL — SIGNIFICANT CHANGE UP (ref 1.8–7.4)
NEUTROPHILS NFR BLD AUTO: 66.6 % — SIGNIFICANT CHANGE UP (ref 43–77)
NEUTS BAND # BLD: 0.9 % — SIGNIFICANT CHANGE UP (ref 0–8)
PHOSPHATE SERPL-MCNC: 3.8 MG/DL — SIGNIFICANT CHANGE UP (ref 2.4–4.7)
PLAT MORPH BLD: NORMAL — SIGNIFICANT CHANGE UP
PLATELET # BLD AUTO: 80 K/UL — LOW (ref 150–400)
POTASSIUM SERPL-MCNC: 3.9 MMOL/L — SIGNIFICANT CHANGE UP (ref 3.5–5.3)
POTASSIUM SERPL-SCNC: 3.9 MMOL/L — SIGNIFICANT CHANGE UP (ref 3.5–5.3)
PROT SERPL-MCNC: 5.4 G/DL — LOW (ref 6.6–8.7)
RBC # BLD: 5.02 M/UL — SIGNIFICANT CHANGE UP (ref 4.2–5.8)
RBC # FLD: 13.2 % — SIGNIFICANT CHANGE UP (ref 10.3–14.5)
RBC BLD AUTO: NORMAL — SIGNIFICANT CHANGE UP
SMUDGE CELLS # BLD: PRESENT — SIGNIFICANT CHANGE UP
SODIUM SERPL-SCNC: 136 MMOL/L — SIGNIFICANT CHANGE UP (ref 135–145)
VARIANT LYMPHS # BLD: 6.1 % — HIGH (ref 0–6)
WBC # BLD: 5.64 K/UL — SIGNIFICANT CHANGE UP (ref 3.8–10.5)
WBC # FLD AUTO: 5.64 K/UL — SIGNIFICANT CHANGE UP (ref 3.8–10.5)

## 2022-02-09 PROCEDURE — 99232 SBSQ HOSP IP/OBS MODERATE 35: CPT

## 2022-02-09 PROCEDURE — 99239 HOSP IP/OBS DSCHRG MGMT >30: CPT

## 2022-02-09 PROCEDURE — 93010 ELECTROCARDIOGRAM REPORT: CPT

## 2022-02-09 RX ORDER — MAGNESIUM SULFATE 500 MG/ML
2 VIAL (ML) INJECTION ONCE
Refills: 0 | Status: COMPLETED | OUTPATIENT
Start: 2022-02-09 | End: 2022-02-09

## 2022-02-09 RX ORDER — APIXABAN 2.5 MG/1
1 TABLET, FILM COATED ORAL
Qty: 60 | Refills: 0
Start: 2022-02-09 | End: 2022-03-10

## 2022-02-09 RX ORDER — SPIRONOLACTONE 25 MG/1
1 TABLET, FILM COATED ORAL
Qty: 30 | Refills: 0
Start: 2022-02-09 | End: 2022-03-10

## 2022-02-09 RX ORDER — FOLIC ACID 0.8 MG
1 TABLET ORAL
Qty: 0 | Refills: 0 | DISCHARGE
Start: 2022-02-09

## 2022-02-09 RX ORDER — FUROSEMIDE 40 MG
1 TABLET ORAL
Qty: 30 | Refills: 0
Start: 2022-02-09 | End: 2022-03-10

## 2022-02-09 RX ORDER — THIAMINE MONONITRATE (VIT B1) 100 MG
1 TABLET ORAL
Qty: 0 | Refills: 0 | DISCHARGE
Start: 2022-02-09

## 2022-02-09 RX ORDER — APIXABAN 2.5 MG/1
5 TABLET, FILM COATED ORAL
Refills: 0 | Status: DISCONTINUED | OUTPATIENT
Start: 2022-02-09 | End: 2022-02-10

## 2022-02-09 RX ORDER — AMLODIPINE BESYLATE 2.5 MG/1
1 TABLET ORAL
Qty: 0 | Refills: 0 | DISCHARGE

## 2022-02-09 RX ORDER — METOPROLOL TARTRATE 50 MG
1 TABLET ORAL
Qty: 30 | Refills: 0
Start: 2022-02-09 | End: 2022-03-10

## 2022-02-09 RX ADMIN — ENOXAPARIN SODIUM 80 MILLIGRAM(S): 100 INJECTION SUBCUTANEOUS at 06:08

## 2022-02-09 RX ADMIN — CHLORHEXIDINE GLUCONATE 1 APPLICATION(S): 213 SOLUTION TOPICAL at 13:06

## 2022-02-09 RX ADMIN — Medication 1 TABLET(S): at 13:05

## 2022-02-09 RX ADMIN — SENNA PLUS 2 TABLET(S): 8.6 TABLET ORAL at 21:38

## 2022-02-09 RX ADMIN — APIXABAN 5 MILLIGRAM(S): 2.5 TABLET, FILM COATED ORAL at 17:10

## 2022-02-09 RX ADMIN — POLYETHYLENE GLYCOL 3350 17 GRAM(S): 17 POWDER, FOR SOLUTION ORAL at 13:05

## 2022-02-09 RX ADMIN — Medication 25 GRAM(S): at 09:38

## 2022-02-09 RX ADMIN — Medication 50 MILLIGRAM(S): at 06:08

## 2022-02-09 RX ADMIN — SPIRONOLACTONE 25 MILLIGRAM(S): 25 TABLET, FILM COATED ORAL at 06:08

## 2022-02-09 RX ADMIN — Medication 100 MILLIGRAM(S): at 13:05

## 2022-02-09 RX ADMIN — Medication 1 MILLIGRAM(S): at 13:05

## 2022-02-09 NOTE — PROGRESS NOTE ADULT - ATTENDING COMMENTS
Seen and examined, pt has been followed closely by our service for AFIB, AFL, AT, NSVT, PVCs and blocked PACs. Currently doing better and liver enzymes improved. He continues to deny any prior syncope or current dizziness. Tele reviewed and showed high burden of salvoes of non-sustained AT, blocked PACs, and occasional PVCs/NSVT. Sinus rate is mostly in the 50s. No AVb during sinus.     Amiodarone was considered but given recent liver injury, lack of sustained arrhythmia, and sinus bradycardia, its probably best to avoid this and c/w metoprolol at current dose at this time    Counseled about potential risk of SCD given his severe LV dysfunction and PVCs/NSVT and role of life vest. He does not want it now.     Suggest  1. c/w metoprolol as tolerated, current dose seems ideal  2. c/w AC and change to NOAC prior to DC  3. Follow up with me in 2-4 weeks from discharge. I ordered MCOT to be mailed to him next week and informed the patient with this    EPS will sign off, call us if needed.

## 2022-02-09 NOTE — DISCHARGE NOTE PROVIDER - NSDCMRMEDTOKEN_GEN_ALL_CORE_FT
Eliquis 5 mg oral tablet: 1 tab(s) orally 2 times a day   folic acid 1 mg oral tablet: 1 tab(s) orally once a day  metoprolol succinate 50 mg oral tablet, extended release: 1 tab(s) orally once a day  Multiple Vitamins oral tablet: 1 tab(s) orally once a day  spironolactone 25 mg oral tablet: 1 tab(s) orally once a day  thiamine 100 mg oral tablet: 1 tab(s) orally once a day   Eliquis 5 mg oral tablet: 1 tab(s) orally 2 times a day   folic acid 1 mg oral tablet: 1 tab(s) orally once a day  Lasix 20 mg oral tablet: 1 tab(s) orally once a day   metoprolol succinate 50 mg oral tablet, extended release: 1 tab(s) orally once a day  Multiple Vitamins oral tablet: 1 tab(s) orally once a day  spironolactone 25 mg oral tablet: 1 tab(s) orally once a day  thiamine 100 mg oral tablet: 1 tab(s) orally once a day

## 2022-02-09 NOTE — DISCHARGE NOTE NURSING/CASE MANAGEMENT/SOCIAL WORK - NSDCFUADDAPPT_GEN_ALL_CORE_FT
f/u with your primary care in the next week.  f/u with your primary care in the next week.   Please keep your Cardio appointment tomorrow Dr Billy Guerrero 11-Feb-2022 09:45am.    Another f/u appt. with Dr Robert Hull ( Heart failure specialist ) at  25-Mar-2022 09:30am .

## 2022-02-09 NOTE — DISCHARGE NOTE PROVIDER - ATTENDING DISCHARGE PHYSICAL EXAMINATION:
Vital Signs Last 24 Hrs  T(C): 36.6 (09 Feb 2022 10:26), Max: 36.8 (08 Feb 2022 18:05)  T(F): 97.8 (09 Feb 2022 10:26), Max: 98.3 (08 Feb 2022 18:05)  HR: 63 (09 Feb 2022 10:26) (51 - 89)  BP: 99/41 (09 Feb 2022 10:26) (90/55 - 106/65)  BP(mean): --  RR: 18 (09 Feb 2022 10:26) (18 - 18)  SpO2: 93% (09 Feb 2022 10:26) (93% - 97%)    CONSTITUTIONAL: NAD  EYES: +EOMI  CARDIAC: Regular rate, regular rhythm.  normal +S1, S2.   RESPIRATORY: Clear to auscultation bilaterally  GASTROINTESTINAL: Abdomen soft, non-tender, no guarding.  NEUROLOGICAL: Alert and oriented, no focal deficits  SKIN: warm, dry, no rashes noted  PSYCHIATRIC: normal affect

## 2022-02-09 NOTE — DISCHARGE NOTE PROVIDER - DETAILS OF MALNUTRITION DIAGNOSIS/DIAGNOSES
This patient has been assessed with a concern for Malnutrition and was treated during this hospitalization for the following Nutrition diagnosis/diagnoses:     -  02/04/2022: Moderate protein-calorie malnutrition

## 2022-02-09 NOTE — DISCHARGE NOTE NURSING/CASE MANAGEMENT/SOCIAL WORK - NSDCPEFALRISK_GEN_ALL_CORE
For information on Fall & Injury Prevention, visit: [email or web address] OR  [email or web address] OR  https://www.cdc.gov/steadi/patient.html

## 2022-02-09 NOTE — DISCHARGE NOTE PROVIDER - NSDCCPCAREPLAN_GEN_ALL_CORE_FT
PRINCIPAL DISCHARGE DIAGNOSIS  Diagnosis: Cardiogenic shock  Assessment and Plan of Treatment:

## 2022-02-09 NOTE — PROGRESS NOTE ADULT - SUBJECTIVE AND OBJECTIVE BOX
CORWIN STUBBS    [id number]    81y      Male    CC: chf     INTERVAL HPI/OVERNIGHT EVENTS: pt seen and examined. no complaints     REVIEW OF SYSTEMS:    CONSTITUTIONAL: No fever, weight loss  RESPIRATORY: No cough, wheezing, hemoptysis; No shortness of breath  CARDIOVASCULAR: No chest pain, palpitations  GASTROINTESTINAL: No abdominal or epigastric pain. No nausea, vomiting  NEUROLOGICAL: No headaches    Vital Signs Last 24 Hrs  T(C): 36.6 (09 Feb 2022 10:26), Max: 36.8 (08 Feb 2022 18:05)  T(F): 97.8 (09 Feb 2022 10:26), Max: 98.3 (08 Feb 2022 18:05)  HR: 63 (09 Feb 2022 10:26) (51 - 89)  BP: 99/41 (09 Feb 2022 10:26) (90/55 - 106/65)  BP(mean): --  RR: 18 (09 Feb 2022 10:26) (18 - 18)  SpO2: 93% (09 Feb 2022 10:26) (93% - 97%)    PHYSICAL EXAM:    GENERAL: NAD  HEENT: PERRL, +EOMI  NECK: soft, supple  CHEST/LUNG: Clear to auscultation bilaterally  HEART: S1S2+, Regular rate and rhythm  ABDOMEN: Soft, Nontender, Nondistended; Bowel sounds present  SKIN: warm, dry  NEURO: AAOX3, no focal deficits  PSYCH: normal affect     LABS:                        15.6   5.64  )-----------( 80       ( 09 Feb 2022 07:00 )             46.4     02-09    136  |  97<L>  |  18.3  ----------------------------<  92  3.9   |  26.0  |  0.61    Ca    8.7      09 Feb 2022 07:00  Phos  3.8     02-09  Mg     1.4     02-09    TPro  5.4<L>  /  Alb  3.1<L>  /  TBili  2.7<H>  /  DBili  x   /  AST  77<H>  /  ALT  295<H>  /  AlkPhos  102  02-09    PT/INR - ( 08 Feb 2022 04:31 )   PT: 17.4 sec;   INR: 1.53 ratio         PTT - ( 08 Feb 2022 04:31 )  PTT:38.6 sec        MEDICATIONS  (STANDING):  apixaban 5 milliGRAM(s) Oral two times a day  chlorhexidine 2% Cloths 1 Application(s) Topical daily  folic acid 1 milliGRAM(s) Oral daily  metoprolol succinate ER 50 milliGRAM(s) Oral daily  multivitamin 1 Tablet(s) Oral daily  polyethylene glycol 3350 17 Gram(s) Oral daily  senna 2 Tablet(s) Oral at bedtime  spironolactone 25 milliGRAM(s) Oral daily  thiamine 100 milliGRAM(s) Oral daily    MEDICATIONS  (PRN):      RADIOLOGY & ADDITIONAL TESTS:

## 2022-02-09 NOTE — DISCHARGE NOTE PROVIDER - CARE PROVIDER_API CALL
Latonia Jones)  Cardiovascular Disease; Internal Medicine  [address] [place] [address]  Phone: [phone]  Fax: [phone]  Follow Up Time:     [employer])  Cardiac Electrophysiology; Cardiovascular Disease; Internal Medicine  [address] [place] [address]  Phone: [phone]  Fax: [phone]  Follow Up Time:    Latonia Jones)  Cardiovascular Disease; Internal Medicine  [address] [place] [address]  Phone: [phone]  Fax: [phone]  Follow Up Time:     [employer])  Cardiac Electrophysiology; Cardiovascular Disease; Internal Medicine  [address] [place] [address]  Phone: [phone]  Fax: [phone]  Follow Up Time: 2 weeks

## 2022-02-09 NOTE — DISCHARGE NOTE NURSING/CASE MANAGEMENT/SOCIAL WORK - PATIENT PORTAL LINK FT
You can access the FollowMyHealth Patient Portal offered by [facility] by registering at the following website: [email or web address]. By joining [employer]’s FollowMyHealth portal, you will also be able to view your health information using other applications (apps) compatible with our system.

## 2022-02-09 NOTE — DISCHARGE NOTE PROVIDER - HOSPITAL COURSE
81M PMH EtOH abuse, HTN, and macular degeneration who presented to [facility] ER on 2/1 c/o weakness, dyspnea on exertion, intermittent chest pain, and decreased PO intake. Pt was instructed to go to the ER after outpatient EKG revealed a flutter w/ RVR. Underwent cardiac catheterization which was revealing of EF 10-15% and 70% mid LAD occlusion. IABP placed. [place] tejas catheter inserted. Cardiac index 1.8. Milrinone infusion initiated at 0.125mcg. Pt was transferred to [facility] for continuation of care where his course has been complicated by cardiogenic shock and with IV vasopressors added for hemodynamic support, acute renal failure, and AFib with RVR. Was given Dofetilide but held given QTc prolongation, and was given IV Magnesium. Pt was weaned off Milrinone and Levophed. downgraded to medical floor. Seen by EP and Heart Failure teams. pt on Ac, will dc on eliquis. Offered LifVest, however pt declined. f/u EP 2-4 weeks, will have MCOT. HF to cont optimizing meds outpt. PT rec: home w/assist. pt stable for dc home. 81M PMH EtOH abuse, HTN, and macular degeneration who presented to [facility] on 2/1 c/o weakness, dyspnea on exertion, intermittent chest pain, and decreased PO intake. Pt was instructed to go to the ER after outpatient EKG revealed a flutter w/ RVR. Underwent cardiac catheterization which was revealing of EF 10-15% and 70% mid LAD occlusion. IABP placed. [place] tejas catheter inserted. Cardiac index 1.8. Milrinone infusion initiated at 0.125mcg. Pt was transferred to [facility] for continuation of care where his course has been complicated by cardiogenic shock and with IV vasopressors added for hemodynamic support, acute renal failure, and AFib with RVR. Was given Dofetilide but held given QTc prolongation, and was given IV Magnesium. Pt was weaned off Milrinone and Levophed. downgraded to medical floor. Seen by EP and Heart Failure teams. pt on Ac, will dc on eliquis. Offered LifVest, pt initially declined, now agreeable . f/u EP 2-4 weeks, will have MCOT. HF to cont optimizing meds outpt. PT rec: home w/assist. pt stable for dc home.

## 2022-02-09 NOTE — DISCHARGE NOTE PROVIDER - PROVIDER TOKENS
PROVIDER:[TOKEN:[[id number]]],PROVIDER:[TOKEN:[[id number]]] PROVIDER:[TOKEN:[[id number]]],PROVIDER:[TOKEN:[[id number]],FOLLOWUP:[2 weeks]]

## 2022-02-09 NOTE — PROGRESS NOTE ADULT - ASSESSMENT
81M PMH EtOH abuse, HTN, and macular degeneration who presented to [facility] on 2/1 c/o weakness, dyspnea on exertion, intermittent chest pain, and decreased PO intake. Pt was instructed to go to the ER after outpatient EKG revealed a flutter w/ RVR. Underwent cardiac catheterization which was revealing of EF 10-15% and 70% mid LAD occlusion. IABP placed. [place] presley catheter inserted. Cardiac index 1.8. Milrinone infusion initiated at 0.125mcg. Pt was transferred to [facility] for continuation of care where his course has been complicated by cardiogenic shock and with IV vasopressors added for hemodynamic support, acute renal failure, and AFib with RVR. Was given Dofetilide but held given QTc prolongation, and was given IV Magnesium. Pt was weaned off Milrinone and Levophed. He is now downgraded to medical floor.     Cardiogenic shock/ HFrEF  S/p repeat RHC 2/7 with low filling pressures  tte EF 20-25%  - off Milrinone  - S/p IABP, s/p [place]ley catheter  - Lasix IV held given elevated lactate; was given 500cc IVF and lactate improved from 3.4 to 1.5  - Monitor Is/Os closely  - CHF team on board  - Continue metoprolol   - Will need life vest prior to dc, pt declined   -dc with lasix 20mg qd     Transaminitis due to shock liver, improving  - trend LFTs, improving     QTc prolongation  - Serial EKG; continue to trend  - S/p IV Mg    Hypomagnesemia  - replaced  - recheck in AM    AFib with RVR  - S/p Dofetilide, held given QTc prolongation  - Lopressor 25mg PO BID  - Monitor rhythm on telemetry  - Full Dose Lovenox 80mg BID, Transition to Eliquis prior to dc when no additional procedures confirmed.     JOON, Resolved  - Monitor UOP, Is/Os, renally adjust medications    Thrombocytopenia likely due to ETOH use  - monitor plt counts  - hold Lovenox if less then 50    EtOH - not actively withdrawing  - Thiamine, folic acid, MVI      Dispo: seen by PT rec home with PT

## 2022-02-10 VITALS
SYSTOLIC BLOOD PRESSURE: 98 MMHG | OXYGEN SATURATION: 98 % | DIASTOLIC BLOOD PRESSURE: 61 MMHG | HEART RATE: 65 BPM | TEMPERATURE: 98 F

## 2022-02-10 PROCEDURE — 85610 PROTHROMBIN TIME: CPT

## 2022-02-10 PROCEDURE — 93451 RIGHT HEART CATH: CPT

## 2022-02-10 PROCEDURE — 99231 SBSQ HOSP IP/OBS SF/LOW 25: CPT

## 2022-02-10 PROCEDURE — 86901 BLOOD TYPING SEROLOGIC RH(D): CPT

## 2022-02-10 PROCEDURE — 85014 HEMATOCRIT: CPT

## 2022-02-10 PROCEDURE — 93306 TTE W/DOPPLER COMPLETE: CPT

## 2022-02-10 PROCEDURE — 85018 HEMOGLOBIN: CPT

## 2022-02-10 PROCEDURE — 99232 SBSQ HOSP IP/OBS MODERATE 35: CPT

## 2022-02-10 PROCEDURE — U0005: CPT

## 2022-02-10 PROCEDURE — 97530 THERAPEUTIC ACTIVITIES: CPT

## 2022-02-10 PROCEDURE — 85025 COMPLETE CBC W/AUTO DIFF WBC: CPT

## 2022-02-10 PROCEDURE — 80053 COMPREHEN METABOLIC PANEL: CPT

## 2022-02-10 PROCEDURE — 83605 ASSAY OF LACTIC ACID: CPT

## 2022-02-10 PROCEDURE — 82803 BLOOD GASES ANY COMBINATION: CPT

## 2022-02-10 PROCEDURE — 85730 THROMBOPLASTIN TIME PARTIAL: CPT

## 2022-02-10 PROCEDURE — 86850 RBC ANTIBODY SCREEN: CPT

## 2022-02-10 PROCEDURE — 83036 HEMOGLOBIN GLYCOSYLATED A1C: CPT

## 2022-02-10 PROCEDURE — 85027 COMPLETE CBC AUTOMATED: CPT

## 2022-02-10 PROCEDURE — 84443 ASSAY THYROID STIM HORMONE: CPT

## 2022-02-10 PROCEDURE — 84132 ASSAY OF SERUM POTASSIUM: CPT

## 2022-02-10 PROCEDURE — 83690 ASSAY OF LIPASE: CPT

## 2022-02-10 PROCEDURE — 84484 ASSAY OF TROPONIN QUANT: CPT

## 2022-02-10 PROCEDURE — C1889: CPT

## 2022-02-10 PROCEDURE — 83880 ASSAY OF NATRIURETIC PEPTIDE: CPT

## 2022-02-10 PROCEDURE — 82947 ASSAY GLUCOSE BLOOD QUANT: CPT

## 2022-02-10 PROCEDURE — 84295 ASSAY OF SERUM SODIUM: CPT

## 2022-02-10 PROCEDURE — U0003: CPT

## 2022-02-10 PROCEDURE — C1894: CPT

## 2022-02-10 PROCEDURE — 82330 ASSAY OF CALCIUM: CPT

## 2022-02-10 PROCEDURE — 36415 COLL VENOUS BLD VENIPUNCTURE: CPT

## 2022-02-10 PROCEDURE — 82435 ASSAY OF BLOOD CHLORIDE: CPT

## 2022-02-10 PROCEDURE — 71045 X-RAY EXAM CHEST 1 VIEW: CPT

## 2022-02-10 PROCEDURE — 97116 GAIT TRAINING THERAPY: CPT

## 2022-02-10 PROCEDURE — 82140 ASSAY OF AMMONIA: CPT

## 2022-02-10 PROCEDURE — 84100 ASSAY OF PHOSPHORUS: CPT

## 2022-02-10 PROCEDURE — 83735 ASSAY OF MAGNESIUM: CPT

## 2022-02-10 PROCEDURE — 82805 BLOOD GASES W/O2 SATURATION: CPT

## 2022-02-10 PROCEDURE — 80048 BASIC METABOLIC PNL TOTAL CA: CPT

## 2022-02-10 PROCEDURE — 86900 BLOOD TYPING SEROLOGIC ABO: CPT

## 2022-02-10 PROCEDURE — 93005 ELECTROCARDIOGRAM TRACING: CPT

## 2022-02-10 RX ORDER — FUROSEMIDE 40 MG
20 TABLET ORAL DAILY
Refills: 0 | Status: DISCONTINUED | OUTPATIENT
Start: 2022-02-10 | End: 2022-02-10

## 2022-02-10 RX ADMIN — CHLORHEXIDINE GLUCONATE 1 APPLICATION(S): 213 SOLUTION TOPICAL at 12:23

## 2022-02-10 RX ADMIN — Medication 100 MILLIGRAM(S): at 12:22

## 2022-02-10 RX ADMIN — SPIRONOLACTONE 25 MILLIGRAM(S): 25 TABLET, FILM COATED ORAL at 05:23

## 2022-02-10 RX ADMIN — POLYETHYLENE GLYCOL 3350 17 GRAM(S): 17 POWDER, FOR SOLUTION ORAL at 12:21

## 2022-02-10 RX ADMIN — Medication 1 MILLIGRAM(S): at 12:20

## 2022-02-10 RX ADMIN — Medication 20 MILLIGRAM(S): at 13:42

## 2022-02-10 RX ADMIN — APIXABAN 5 MILLIGRAM(S): 2.5 TABLET, FILM COATED ORAL at 05:24

## 2022-02-10 RX ADMIN — Medication 50 MILLIGRAM(S): at 05:23

## 2022-02-10 RX ADMIN — Medication 1 TABLET(S): at 12:23

## 2022-02-10 NOTE — PROGRESS NOTE ADULT - PROVIDER SPECIALTY LIST ADULT
Hospitalist
Cardiology
Critical Care
Electrophysiology
Cardiology
Critical Care
Electrophysiology
Hospitalist
Hospitalist
Cardiology
Cardiology
Critical Care
Electrophysiology
MICU
Heart Failure
MICU
Heart Failure
Heart Failure

## 2022-02-10 NOTE — PROGRESS NOTE ADULT - SUBJECTIVE AND OBJECTIVE BOX
CORWIN STUBBS    [id number]    81y      Male    CC: cardiogenic shock    INTERVAL HPI/OVERNIGHT EVENTS: pt seen and examined. now agreeable to LifeVest    REVIEW OF SYSTEMS:    CONSTITUTIONAL: No fever, weight loss, or fatigue  RESPIRATORY: No cough, wheezing, hemoptysis; No shortness of breath  CARDIOVASCULAR: No chest pain, palpitations  GASTROINTESTINAL: No abdominal or epigastric pain. No nausea, vomiting  NEUROLOGICAL: No headaches    Vital Signs Last 24 Hrs  T(C): 36.8 (10 Feb 2022 10:11), Max: 36.8 (10 Feb 2022 04:44)  T(F): 98.2 (10 Feb 2022 10:11), Max: 98.2 (10 Feb 2022 04:44)  HR: 43 (10 Feb 2022 10:11) (43 - 73)  BP: 98/64 (10 Feb 2022 10:11) (95/62 - 103/73)  BP(mean): --  RR: 18 (10 Feb 2022 04:44) (18 - 18)  SpO2: 91% (10 Feb 2022 04:44) (91% - 93%)    PHYSICAL EXAM:    GENERAL: NAD  HEENT: PERRL, +EOMI  NECK: soft, supple  CHEST/LUNG: Clear to auscultation bilaterally  HEART: S1S2+, irregular   ABDOMEN: Soft, Nontender, Nondistended; Bowel sounds present  SKIN: warm, dry  NEURO: AAOX3, no focal deficits  PSYCH: normal affect     LABS:                        15.6   5.64  )-----------( 80       ( 09 Feb 2022 07:00 )             46.4     02-09    136  |  97<L>  |  18.3  ----------------------------<  92  3.9   |  26.0  |  0.61    Ca    8.7      09 Feb 2022 07:00  Phos  3.8     02-09  Mg     1.4     02-09    TPro  5.4<L>  /  Alb  3.1<L>  /  TBili  2.7<H>  /  DBili  x   /  AST  77<H>  /  ALT  295<H>  /  AlkPhos  102  02-09            MEDICATIONS  (STANDING):  apixaban 5 milliGRAM(s) Oral two times a day  chlorhexidine 2% Cloths 1 Application(s) Topical daily  folic acid 1 milliGRAM(s) Oral daily  furosemide    Tablet 20 milliGRAM(s) Oral daily  metoprolol succinate ER 50 milliGRAM(s) Oral daily  multivitamin 1 Tablet(s) Oral daily  polyethylene glycol 3350 17 Gram(s) Oral daily  senna 2 Tablet(s) Oral at bedtime  spironolactone 25 milliGRAM(s) Oral daily  thiamine 100 milliGRAM(s) Oral daily    MEDICATIONS  (PRN):      RADIOLOGY & ADDITIONAL TESTS:

## 2022-02-10 NOTE — PROGRESS NOTE ADULT - ASSESSMENT
81M PMH EtOH abuse, HTN, and macular degeneration who presented to [facility] on 2/1 c/o weakness, dyspnea on exertion, intermittent chest pain, and decreased PO intake. Pt was instructed to go to the ER after outpatient EKG revealed a flutter w/ RVR. Underwent cardiac catheterization which was revealing of EF 10-15% and 70% mid LAD occlusion. IABP placed. [place] presley catheter inserted. Cardiac index 1.8. Milrinone infusion initiated at 0.125mcg. Pt was transferred to [facility] for continuation of care where his course has been complicated by cardiogenic shock and with IV vasopressors added for hemodynamic support, acute renal failure, and AFib with RVR. Was given Dofetilide but held given QTc prolongation, and was given IV Magnesium. Pt was weaned off Milrinone and Levophed. He is now downgraded to medical floor.     Cardiogenic shock/ HFrEF  S/p repeat RHC 2/7 with low filling pressures  tte EF 20-25%  - off Milrinone  - S/p IABP, s/p [place] catheter  - Lasix IV held given elevated lactate; was given 500cc IVF and lactate improved from 3.4 to 1.5  - Monitor Is/Os closely  - CHF team on board  - Continue metoprolol   - Will need life vest prior to dc, pt declined initially, now agreeable   - lasix 20mg qd     Transaminitis due to shock liver, improving  - trend LFTs, improving     QTc prolongation  - Serial EKG; continue to trend  - S/p IV Mg    Hypomagnesemia  - replaced  - recheck in AM    AFib with RVR  - S/p Dofetilide, held given QTc prolongation  - Lopressor 25mg PO BID  - Monitor rhythm on telemetry  - Full Dose Lovenox 80mg BID, Transition to Eliquis prior to dc when no additional procedures confirmed.     JOON, Resolved  - Monitor UOP, Is/Os, renally adjust medications    Thrombocytopenia likely due to ETOH use  - monitor plt counts  - hold Lovenox if less then 50    EtOH - not actively withdrawing  - Thiamine, folic acid, MVI      Dispo: seen by PT rec home with PT; home w/ Life Vest

## 2022-02-10 NOTE — PROGRESS NOTE ADULT - ASSESSMENT
This is an 81 year old male patient with a history of HTN, macular degeneration, and daily ETOH consumption admitted with acute systolic heart failure (non-ischemic), EF 10-15%, and new onset atrial flutter/fibrillation (CHADSVASC: 5) with RVR. Noted to have non-sustained AT as well as NSVT.    Patient has severe non-ischemic LV dysfunction (EF <20%), with single vessel CAD (70% LAD). Patient also has paroxysmal AFIB/AFL and very frequent PVCS/NSVT and at risk of sudden cardiac death while waiting for the LV function to recover. Advised him not to drive for at least one month, and strongly recommended a Life Vest. He is now agreeable. We also counseled him about the LifeVest, GDMT and abstinence from alcohol.    Will order LifeVest  Follow up with me as o/p

## 2022-02-10 NOTE — PROGRESS NOTE ADULT - REASON FOR ADMISSION
Cardiogenic shock

## 2022-02-10 NOTE — PROGRESS NOTE ADULT - SUBJECTIVE AND OBJECTIVE BOX
Patient seen and examined today. He now wants to have the LifeVest therapy. Counseled    TELE: SR with frequent salvos of APCs, blocked APCs. and non-sustained ATach. No tele evidence of high risk pauses. Frequent PVCs/NSVTs    MEDICATIONS  (STANDING):  apixaban 5 milliGRAM(s) Oral two times a day  chlorhexidine 2% Cloths 1 Application(s) Topical daily  folic acid 1 milliGRAM(s) Oral daily  furosemide    Tablet 20 milliGRAM(s) Oral daily  metoprolol succinate ER 50 milliGRAM(s) Oral daily  multivitamin 1 Tablet(s) Oral daily  polyethylene glycol 3350 17 Gram(s) Oral daily  senna 2 Tablet(s) Oral at bedtime  spironolactone 25 milliGRAM(s) Oral daily  thiamine 100 milliGRAM(s) Oral daily    MEDICATIONS  (PRN):      Allergies  No Known Allergies    PAST MEDICAL & SURGICAL HISTORY:  ETOH abuse  HTN (hypertension)  Macular degeneration    Vital Signs Last 24 Hrs  T(C): 36.8 (10 Feb 2022 04:44), Max: 36.8 (10 Feb 2022 04:44)  T(F): 98.2 (10 Feb 2022 04:44), Max: 98.2 (10 Feb 2022 04:44)  HR: 71 (10 Feb 2022 04:44) (63 - 73)  BP: 95/62 (10 Feb 2022 04:44) (95/62 - 103/73)  BP(mean): --  RR: 18 (10 Feb 2022 04:44) (18 - 18)  SpO2: 91% (10 Feb 2022 04:44) (91% - 93%)    Physical Exam:  Constitutional: NAD, AAOx3  Cardiovascular: +S1S2 irregular. SR with frequent atrial ectopy  Pulmonary: CTA b/l, unlabored  GI: soft NTND  Extremities: no pedal edema,   Neuro: non focal, ACKERMAN x4    LABS:                                   15.6   5.64  )-----------( 80       ( 09 Feb 2022 07:00 )             46.4   02-09    136  |  97<L>  |  18.3  ----------------------------<  92  3.9   |  26.0  |  0.61    Ca    8.7      09 Feb 2022 07:00  Phos  3.8     02-09  Mg     1.4     02-09    TPro  5.4<L>  /  Alb  3.1<L>  /  TBili  2.7<H>  /  DBili  x   /  AST  77<H>  /  ALT  295<H>  /  AlkPhos  102  02-09            < from: TTE Echo Complete w/o Contrast w/ Doppler (02.03.22 @ 11:01) >  Summary:   1. Technically difficult study.   2. Left ventricular ejection fraction, by visual estimation, is 20 to 25%.   3. Severely decreased global left ventricular systolic function.   4. Spectral Doppler shows impaired relaxation pattern of left ventricular myocardial filling (Grade I diastolic dysfunction).   5. Mildly enlarged right ventricle.   6. The left atrium is normal in size.   7. Mildmitral annular calcification.   8. Mild to moderate mitral valve regurgitation.   9. The mitral valve leaflets are tethered which is due to reduced systolic function and elevated LVDP.  10. Mild tricuspid regurgitation.  11. Estimated pulmonary artery systolic pressure is 42.0 mmHg assuming a right atrial pressure of 15 mmHg, which is consistent with mild pulmonary hypertension.  12. Mild aortic regurgitation.  13. Sclerotic aortic valve with normal opening.  14. Trivial pericardial effusion.  15. Endocardial visualization was enhanced with intravenous echo contrast.  16. Pulse wave Doppler in the descending aorta is consistent with IABP in place.  17. There are no prior studies on this patient for comparison purposes.    < end of copied text >

## 2022-02-11 ENCOUNTER — APPOINTMENT (OUTPATIENT)
Dept: CARDIOLOGY | Facility: CLINIC | Age: 82
End: 2022-02-11
Payer: MEDICARE

## 2022-02-11 ENCOUNTER — NON-APPOINTMENT (OUTPATIENT)
Age: 82
End: 2022-02-11

## 2022-02-11 VITALS
BODY MASS INDEX: 24.88 KG/M2 | OXYGEN SATURATION: 98 % | HEART RATE: 63 BPM | TEMPERATURE: 97.5 F | HEIGHT: 69 IN | WEIGHT: 168 LBS | DIASTOLIC BLOOD PRESSURE: 72 MMHG | SYSTOLIC BLOOD PRESSURE: 98 MMHG

## 2022-02-11 PROCEDURE — 93000 ELECTROCARDIOGRAM COMPLETE: CPT

## 2022-02-11 PROCEDURE — 99215 OFFICE O/P EST HI 40 MIN: CPT

## 2022-02-28 ENCOUNTER — APPOINTMENT (OUTPATIENT)
Dept: CARDIOLOGY | Facility: CLINIC | Age: 82
End: 2022-02-28
Payer: MEDICARE

## 2022-02-28 PROCEDURE — 99214 OFFICE O/P EST MOD 30 MIN: CPT

## 2022-03-01 DIAGNOSIS — F10.10 ALCOHOL ABUSE, UNCOMPLICATED: ICD-10-CM

## 2022-03-01 DIAGNOSIS — R06.00 DYSPNEA, UNSPECIFIED: ICD-10-CM

## 2022-03-01 DIAGNOSIS — R07.9 CHEST PAIN, UNSPECIFIED: ICD-10-CM

## 2022-03-03 RX ORDER — MULTIVITAMIN
TABLET ORAL
Refills: 0 | Status: ACTIVE | COMMUNITY
Start: 2022-02-11

## 2022-03-03 RX ORDER — FOLIC ACID 1 MG/1
1 TABLET ORAL
Refills: 0 | Status: ACTIVE | COMMUNITY
Start: 2022-02-11

## 2022-03-07 ENCOUNTER — APPOINTMENT (OUTPATIENT)
Dept: ELECTROPHYSIOLOGY | Facility: CLINIC | Age: 82
End: 2022-03-07

## 2022-03-08 ENCOUNTER — OUTPATIENT (OUTPATIENT)
Dept: OUTPATIENT SERVICES | Facility: HOSPITAL | Age: 82
LOS: 1 days | End: 2022-03-08

## 2022-03-08 DIAGNOSIS — I50.22 CHRONIC SYSTOLIC (CONGESTIVE) HEART FAILURE: ICD-10-CM

## 2022-03-11 ENCOUNTER — NON-APPOINTMENT (OUTPATIENT)
Age: 82
End: 2022-03-11

## 2022-03-11 DIAGNOSIS — R06.00 DYSPNEA, UNSPECIFIED: ICD-10-CM

## 2022-03-11 DIAGNOSIS — R07.9 CHEST PAIN, UNSPECIFIED: ICD-10-CM

## 2022-03-11 DIAGNOSIS — F10.10 ALCOHOL ABUSE, UNCOMPLICATED: ICD-10-CM

## 2022-03-11 DIAGNOSIS — I25.5 ISCHEMIC CARDIOMYOPATHY: ICD-10-CM

## 2022-03-15 ENCOUNTER — APPOINTMENT (OUTPATIENT)
Dept: CARDIOLOGY | Facility: CLINIC | Age: 82
End: 2022-03-15
Payer: MEDICARE

## 2022-03-15 VITALS
BODY MASS INDEX: 23.4 KG/M2 | HEART RATE: 100 BPM | TEMPERATURE: 97.3 F | SYSTOLIC BLOOD PRESSURE: 88 MMHG | HEIGHT: 69 IN | OXYGEN SATURATION: 99 % | WEIGHT: 158 LBS | DIASTOLIC BLOOD PRESSURE: 52 MMHG

## 2022-03-15 PROCEDURE — 99214 OFFICE O/P EST MOD 30 MIN: CPT

## 2022-03-24 ENCOUNTER — OUTPATIENT (OUTPATIENT)
Dept: INPATIENT UNIT | Facility: HOSPITAL | Age: 82
LOS: 1 days | End: 2022-03-24
Payer: MEDICARE

## 2022-03-24 PROCEDURE — 93010 ELECTROCARDIOGRAM REPORT: CPT | Mod: 76

## 2022-03-24 PROCEDURE — 92928 PRQ TCAT PLMT NTRAC ST 1 LES: CPT | Mod: LD

## 2022-03-25 ENCOUNTER — APPOINTMENT (OUTPATIENT)
Dept: HEART FAILURE | Facility: CLINIC | Age: 82
End: 2022-03-25

## 2022-03-25 PROCEDURE — 93010 ELECTROCARDIOGRAM REPORT: CPT

## 2022-03-28 ENCOUNTER — NON-APPOINTMENT (OUTPATIENT)
Age: 82
End: 2022-03-28

## 2022-03-28 ENCOUNTER — APPOINTMENT (OUTPATIENT)
Dept: CARDIOLOGY | Facility: CLINIC | Age: 82
End: 2022-03-28
Payer: MEDICARE

## 2022-03-28 VITALS
TEMPERATURE: 97.3 F | SYSTOLIC BLOOD PRESSURE: 110 MMHG | HEIGHT: 69.5 IN | HEART RATE: 61 BPM | WEIGHT: 161 LBS | DIASTOLIC BLOOD PRESSURE: 60 MMHG | OXYGEN SATURATION: 98 % | BODY MASS INDEX: 23.31 KG/M2

## 2022-03-28 PROCEDURE — 99214 OFFICE O/P EST MOD 30 MIN: CPT

## 2022-03-28 PROCEDURE — 93000 ELECTROCARDIOGRAM COMPLETE: CPT

## 2022-03-29 DIAGNOSIS — I50.42 CHRONIC COMBINED SYSTOLIC (CONGESTIVE) AND DIASTOLIC (CONGESTIVE) HEART FAILURE: ICD-10-CM

## 2022-03-29 DIAGNOSIS — Z87.891 PERSONAL HISTORY OF NICOTINE DEPENDENCE: ICD-10-CM

## 2022-03-29 DIAGNOSIS — I25.10 ATHEROSCLEROTIC HEART DISEASE OF NATIVE CORONARY ARTERY WITHOUT ANGINA PECTORIS: ICD-10-CM

## 2022-03-29 DIAGNOSIS — I50.9 HEART FAILURE, UNSPECIFIED: ICD-10-CM

## 2022-03-29 DIAGNOSIS — E78.5 HYPERLIPIDEMIA, UNSPECIFIED: ICD-10-CM

## 2022-03-29 DIAGNOSIS — Z79.01 LONG TERM (CURRENT) USE OF ANTICOAGULANTS: ICD-10-CM

## 2022-03-29 DIAGNOSIS — I11.0 HYPERTENSIVE HEART DISEASE WITH HEART FAILURE: ICD-10-CM

## 2022-03-29 DIAGNOSIS — I48.0 PAROXYSMAL ATRIAL FIBRILLATION: ICD-10-CM

## 2022-03-31 ENCOUNTER — NON-APPOINTMENT (OUTPATIENT)
Age: 82
End: 2022-03-31

## 2022-04-17 ENCOUNTER — RX CHANGE (OUTPATIENT)
Age: 82
End: 2022-04-17

## 2022-04-26 ENCOUNTER — APPOINTMENT (OUTPATIENT)
Dept: CARDIOLOGY | Facility: CLINIC | Age: 82
End: 2022-04-26
Payer: MEDICARE

## 2022-04-26 VITALS
HEART RATE: 51 BPM | BODY MASS INDEX: 25.04 KG/M2 | OXYGEN SATURATION: 98 % | TEMPERATURE: 97.7 F | DIASTOLIC BLOOD PRESSURE: 50 MMHG | SYSTOLIC BLOOD PRESSURE: 80 MMHG | WEIGHT: 172 LBS

## 2022-04-26 PROCEDURE — 99214 OFFICE O/P EST MOD 30 MIN: CPT

## 2022-04-26 RX ORDER — ASPIRIN ENTERIC COATED TABLETS 81 MG 81 MG/1
81 TABLET, DELAYED RELEASE ORAL DAILY
Qty: 90 | Refills: 3 | Status: DISCONTINUED | COMMUNITY
Start: 2022-03-28 | End: 2022-04-26

## 2022-04-26 RX ORDER — PANTOPRAZOLE 40 MG/1
40 TABLET, DELAYED RELEASE ORAL
Qty: 30 | Refills: 0 | Status: DISCONTINUED | COMMUNITY
Start: 2022-03-24 | End: 2022-04-26

## 2022-05-18 ENCOUNTER — APPOINTMENT (OUTPATIENT)
Dept: CARDIOLOGY | Facility: CLINIC | Age: 82
End: 2022-05-18
Payer: MEDICARE

## 2022-05-18 PROCEDURE — 96374 THER/PROPH/DIAG INJ IV PUSH: CPT | Mod: 59

## 2022-05-18 PROCEDURE — 93308 TTE F-UP OR LMTD: CPT

## 2022-05-24 ENCOUNTER — NON-APPOINTMENT (OUTPATIENT)
Age: 82
End: 2022-05-24

## 2022-05-26 ENCOUNTER — APPOINTMENT (OUTPATIENT)
Dept: OPHTHALMOLOGY | Facility: CLINIC | Age: 82
End: 2022-05-26
Payer: MEDICARE

## 2022-05-26 ENCOUNTER — NON-APPOINTMENT (OUTPATIENT)
Age: 82
End: 2022-05-26

## 2022-05-26 PROCEDURE — 92134 CPTRZ OPH DX IMG PST SGM RTA: CPT

## 2022-05-26 PROCEDURE — 92014 COMPRE OPH EXAM EST PT 1/>: CPT

## 2022-05-26 PROCEDURE — 92015 DETERMINE REFRACTIVE STATE: CPT

## 2022-05-27 ENCOUNTER — APPOINTMENT (OUTPATIENT)
Dept: HEART FAILURE | Facility: CLINIC | Age: 82
End: 2022-05-27
Payer: MEDICARE

## 2022-05-27 VITALS
OXYGEN SATURATION: 95 % | BODY MASS INDEX: 25.77 KG/M2 | TEMPERATURE: 97.1 F | RESPIRATION RATE: 14 BRPM | HEART RATE: 66 BPM | SYSTOLIC BLOOD PRESSURE: 98 MMHG | HEIGHT: 69.5 IN | WEIGHT: 178 LBS | DIASTOLIC BLOOD PRESSURE: 60 MMHG

## 2022-05-27 VITALS — SYSTOLIC BLOOD PRESSURE: 104 MMHG | DIASTOLIC BLOOD PRESSURE: 64 MMHG

## 2022-05-27 PROCEDURE — 99214 OFFICE O/P EST MOD 30 MIN: CPT

## 2022-05-27 RX ORDER — FUROSEMIDE 20 MG/1
20 TABLET ORAL
Qty: 90 | Refills: 3 | Status: DISCONTINUED | COMMUNITY
Start: 2022-02-11 | End: 2022-05-27

## 2022-05-27 NOTE — PHYSICAL EXAM
[No Acute Distress] : no acute distress [Normal Conjunctiva] : normal conjunctiva [Normal Venous Pressure] : normal venous pressure [Normal S1, S2] : normal S1, S2 [No Murmur] : no murmur [Clear Lung Fields] : clear lung fields [Soft] : abdomen soft [Non Tender] : non-tender [Normal Gait] : normal gait [No Edema] : no edema [Moves all extremities] : moves all extremities [Alert and Oriented] : alert and oriented

## 2022-05-31 NOTE — HISTORY OF PRESENT ILLNESS
[FreeTextEntry1] : \par 81 year old male with HFrEF (LVEF 27%), ICMP, CAD s/p PCI to pLAD (2/2/22), pAF (on Eliquis), HTN, ETOH use x40-50 years. He presented to [facility] [place] in February 2022 with generalized fatigue, weakness, aches and was found to be in multisystem organ dysfunction. He subsequently developed cardiogenic shock requiring transfer to [facility] with IABP and milrinone infusion. LVEF at the time was 10-15%. He clinically improved and was discharged with a LifeVest. \par \par Today he presents to heart failure clinic to establish care. Repeat TTE (5/18) reveals LVEF 27%. He states he feels fatigued most days but overall feels better since hospitalization. He participates in cardiac rehab 3x a week where he walks on the treadmill 15+ minutes and lifts weights. He has cataracts and macular degeneration for which he is being treated for. He still works as an  which he states has a moderate but manageable level of daily stress. His weight at home has been stable at 175 lb. He adheres to his medication regimen and tries to follow a low sodium diet.\par \par He denies CP, palpitations, SOB, LE swelling, dizziness/lightheadedness, presyncope/syncope.

## 2022-05-31 NOTE — ASSESSMENT
[FreeTextEntry1] :  82 y/o male with HFrEF (LVEF 27%), ICMP +/- ETOH induced, CAD s/p PCI to mLAD, pAF on Eliquis. He is doing well since hospital discharge. He is ACC/AHA Stage C, NYHA class I symptoms. He is euvolemic, normotensive and well perfused with warm extremities. \par \par #Chronic systolic HF\par -GDMT: -Continue Valsartan 40mg daily (can consider switching to Entresto once tolerating all 4 classes of medications for HF)\par            -Continue Toprol XL 25 mg daily\par            -Continue Spironolactone 25 mg daily\par            -Start Farxiga 10mg daily\par -Diuretic: Has not required. Pt aware if >3lb/one day or >5 lb/one week weight gain to take Furosemide PRN however with SGLT2i may not need to\par -Labs: Repeat labs in 7-10 days\par -LVEF remains </= 35%, recommend EP referral for discussion of AICD for primary prevention\par -Can consider repeat TTE in 3 months once tolerating GDMT\par -HF lifestyle modifications including low sodium diet, daily weight and BP log reviewed\par \par #CAD s/p mLAD PCI\par -Remains on Plavix (off ASA to avoid triple therapy in setting of Eliquis)\par -Follows with Dr. Guerrero\par \par #pAF\par -On Toprol, Eliquis\par \par #HTN\par -Appears controlled on above regimen\par \par Follow up wt Dr. Jones in 4-5 months.\par

## 2022-05-31 NOTE — CARDIOLOGY SUMMARY
[de-identified] : \par 3/25/22: SR, LAFB, poor R wave progression, nonspecific ST abnormalities\par \par 2/11/22: appears sinus rhythm, low voltage in limb leads, LAFB, nonspecific ST abnormalities\par  [de-identified] : \par 5/18/22: Limited TTE with Lumason for LV assessment: LVEF 27%, severe LV systolic dysfunction, mild LV enlargement\par \par 2/2/22: LVEF <20%, LVIDd 6.3 cm, severely dilated LV, LV diastolic dysfunction, mildly dilated RA and RV, moderate dilated LA, mod TR, mod MR, mild pHTN, small pericardial effusion [de-identified] : \par Lifevest since Feb 2022 [de-identified] : \par 3/24/22: LHC: PCI to mLAD\par \par 2/2/22 LHC: mLAD 70% stenosis however sig depressed CI with elevated LVEDP. IABP placed and pt transferred [facility]

## 2022-06-07 ENCOUNTER — NON-APPOINTMENT (OUTPATIENT)
Age: 82
End: 2022-06-07

## 2022-06-07 ENCOUNTER — APPOINTMENT (OUTPATIENT)
Dept: ELECTROPHYSIOLOGY | Facility: CLINIC | Age: 82
End: 2022-06-07
Payer: MEDICARE

## 2022-06-07 VITALS
HEIGHT: 69 IN | HEART RATE: 65 BPM | BODY MASS INDEX: 25.77 KG/M2 | OXYGEN SATURATION: 95 % | DIASTOLIC BLOOD PRESSURE: 58 MMHG | TEMPERATURE: 97.3 F | SYSTOLIC BLOOD PRESSURE: 108 MMHG | WEIGHT: 174 LBS

## 2022-06-07 PROCEDURE — 99213 OFFICE O/P EST LOW 20 MIN: CPT

## 2022-06-07 PROCEDURE — 93000 ELECTROCARDIOGRAM COMPLETE: CPT

## 2022-06-12 NOTE — DISCUSSION/SUMMARY
[FreeTextEntry1] : Patient is an 82-year-old man who has severe left ventricular dysfunction and history of CAD with status post PCI to the mid LAD.  He had cardiogenic shock in his improved significantly.  His most recent LVEF was 27%.  Patient currently has a LifeVest.  He has had prior paroxysmal atrial fibrillation and has been on Eliquis.  In addition he is on clopidogrel.\par \par He is on guideline directed medical therapy.  Patient is on valsartan 40 mg daily and Toprol-XL 25 mg/day.  He is also on spironolactone, farxiga.\par \par He has been on 3 months of guideline directed medical therapy and his ejection fraction has improved to 27%.  He is compliant with medications.\par \par Would recommend that he proceed with ICD.  His QRS duration is narrow and the likely would not benefit from biventricular pacing.\par \par The patient wants to think about it and would call us to let us know when he is ready to proceed.  He may also want to get another ejection fraction before having the procedure.

## 2022-06-12 NOTE — REVIEW OF SYSTEMS
[Feeling Fatigued] : not feeling fatigued [SOB] : no shortness of breath [Dyspnea on exertion] : not dyspnea during exertion [Chest Discomfort] : no chest discomfort [Lower Ext Edema] : no extremity edema [Orthopnea] : no orthopnea [PND] : no PND [Cough] : no cough [Abdominal Pain] : no abdominal pain [Dizziness] : no dizziness

## 2022-06-12 NOTE — PHYSICAL EXAM
[No Acute Distress] : no acute distress [Normal Conjunctiva] : normal conjunctiva [Normal Venous Pressure] : normal venous pressure [Normal S1, S2] : normal S1, S2 [Good Air Entry] : good air entry [Non Tender] : non-tender [No Edema] : no edema [No Cyanosis] : no cyanosis [No Clubbing] : no clubbing [No Rash] : no rash [No Focal Deficits] : no focal deficits [Alert and Oriented] : alert and oriented

## 2022-06-12 NOTE — CARDIOLOGY SUMMARY
[de-identified] : SR Pause blocked APC QRS duration 115 msec -Old inferior-apical infarct  -Old anteroseptal infarct  -Left axis secondary to infarct.   -  Nonspecific T-abnormality.

## 2022-06-16 ENCOUNTER — APPOINTMENT (OUTPATIENT)
Dept: CARDIOLOGY | Facility: CLINIC | Age: 82
End: 2022-06-16
Payer: MEDICARE

## 2022-06-16 ENCOUNTER — NON-APPOINTMENT (OUTPATIENT)
Age: 82
End: 2022-06-16

## 2022-06-16 PROCEDURE — 93308 TTE F-UP OR LMTD: CPT

## 2022-06-16 PROCEDURE — 96374 THER/PROPH/DIAG INJ IV PUSH: CPT | Mod: 59

## 2022-06-20 ENCOUNTER — OUTPATIENT (OUTPATIENT)
Dept: OUTPATIENT SERVICES | Facility: HOSPITAL | Age: 82
LOS: 1 days | End: 2022-06-20

## 2022-06-21 ENCOUNTER — INPATIENT (INPATIENT)
Facility: HOSPITAL | Age: 82
LOS: 0 days | Discharge: HOME | End: 2022-06-22
Attending: INTERNAL MEDICINE | Admitting: INTERNAL MEDICINE
Payer: MEDICARE

## 2022-06-21 ENCOUNTER — APPOINTMENT (OUTPATIENT)
Dept: CARDIOLOGY | Facility: CLINIC | Age: 82
End: 2022-06-21

## 2022-06-21 DIAGNOSIS — R00.1 BRADYCARDIA, UNSPECIFIED: ICD-10-CM

## 2022-06-21 DIAGNOSIS — I11.0 HYPERTENSIVE HEART DISEASE WITH HEART FAILURE: ICD-10-CM

## 2022-06-21 PROCEDURE — 93010 ELECTROCARDIOGRAM REPORT: CPT

## 2022-06-21 PROCEDURE — 33249 INSJ/RPLCMT DEFIB W/LEAD(S): CPT

## 2022-06-22 LAB — SARS-COV-2 N GENE NPH QL NAA+PROBE: NOT DETECTED

## 2022-06-22 PROCEDURE — 93010 ELECTROCARDIOGRAM REPORT: CPT

## 2022-06-22 PROCEDURE — 71045 X-RAY EXAM CHEST 1 VIEW: CPT | Mod: 26

## 2022-06-24 ENCOUNTER — NON-APPOINTMENT (OUTPATIENT)
Age: 82
End: 2022-06-24

## 2022-06-26 DIAGNOSIS — Z01.810 ENCOUNTER FOR PREPROCEDURAL CARDIOVASCULAR EXAMINATION: ICD-10-CM

## 2022-06-26 DIAGNOSIS — Z01.812 ENCOUNTER FOR PREPROCEDURAL LABORATORY EXAMINATION: ICD-10-CM

## 2022-06-26 DIAGNOSIS — I50.42 CHRONIC COMBINED SYSTOLIC (CONGESTIVE) AND DIASTOLIC (CONGESTIVE) HEART FAILURE: ICD-10-CM

## 2022-06-28 DIAGNOSIS — I48.91 UNSPECIFIED ATRIAL FIBRILLATION: ICD-10-CM

## 2022-06-28 DIAGNOSIS — Z95.5 PRESENCE OF CORONARY ANGIOPLASTY IMPLANT AND GRAFT: ICD-10-CM

## 2022-06-28 DIAGNOSIS — I50.20 UNSPECIFIED SYSTOLIC (CONGESTIVE) HEART FAILURE: ICD-10-CM

## 2022-06-28 DIAGNOSIS — I25.5 ISCHEMIC CARDIOMYOPATHY: ICD-10-CM

## 2022-06-28 DIAGNOSIS — Z79.01 LONG TERM (CURRENT) USE OF ANTICOAGULANTS: ICD-10-CM

## 2022-06-28 DIAGNOSIS — F10.11 ALCOHOL ABUSE, IN REMISSION: ICD-10-CM

## 2022-07-06 ENCOUNTER — APPOINTMENT (OUTPATIENT)
Dept: CARDIOLOGY | Facility: CLINIC | Age: 82
End: 2022-07-06

## 2022-07-06 VITALS
WEIGHT: 172 LBS | OXYGEN SATURATION: 97 % | DIASTOLIC BLOOD PRESSURE: 54 MMHG | BODY MASS INDEX: 25.48 KG/M2 | HEIGHT: 69 IN | SYSTOLIC BLOOD PRESSURE: 104 MMHG | HEART RATE: 63 BPM | TEMPERATURE: 97.3 F

## 2022-07-06 PROCEDURE — 93283 PRGRMG EVAL IMPLANTABLE DFB: CPT

## 2022-07-06 PROCEDURE — 99024 POSTOP FOLLOW-UP VISIT: CPT

## 2022-07-19 ENCOUNTER — NON-APPOINTMENT (OUTPATIENT)
Age: 82
End: 2022-07-19

## 2022-07-31 ENCOUNTER — RESULT CHARGE (OUTPATIENT)
Age: 82
End: 2022-07-31

## 2022-08-01 ENCOUNTER — APPOINTMENT (OUTPATIENT)
Dept: CARDIOLOGY | Facility: CLINIC | Age: 82
End: 2022-08-01

## 2022-08-01 ENCOUNTER — NON-APPOINTMENT (OUTPATIENT)
Age: 82
End: 2022-08-01

## 2022-08-01 VITALS
DIASTOLIC BLOOD PRESSURE: 54 MMHG | HEIGHT: 69 IN | SYSTOLIC BLOOD PRESSURE: 108 MMHG | WEIGHT: 170 LBS | BODY MASS INDEX: 25.18 KG/M2 | OXYGEN SATURATION: 98 % | TEMPERATURE: 97.3 F | HEART RATE: 55 BPM

## 2022-08-01 PROCEDURE — 99214 OFFICE O/P EST MOD 30 MIN: CPT

## 2022-08-01 NOTE — HISTORY OF PRESENT ILLNESS
[FreeTextEntry1] : CORWIN STUBBS is a 82 year old male with a past medical history of:\par \par HTN,  cataracts, macular degeneration, HFrEF (LVEF 27%), ICMP, CAD s/p PCI to pLAD (2/2/22), pAF (on Eliquis), ethanol abuse x40 to 50 years.\par \par Presented to [facility] with acute decompensated systolic congestive heart failure. He was in multiorgan system dysfunction and cardiogenic shock and was brought to the Cath Lab given worsening of his renal and liver function. He was found to have single-vessel CAD. His cardiac index was low and a balloon pump was placed along with a milrinone drip. He was transferred emergently to [facility] where he made a very nice recovery. His creatinine normalized and his LFTs have improved significantly. Dc'ed with Life vest.\par \par \par Last seen by heart failure 5/27/22 and EP 6/7/22. On 6/7/22 it was recommended to proceed with ICD. S/p [employer] ICD on 6/21/22. Presents today for office visit. Initially, his visit today was supposed to be for cardiac clearance prior to a dental procedure, however, he said his antibiotics are relieving his sx's and he may not have dental procedure. He reports one episode of dizziness/lightheadedness last week after changing positions too quickly. I have contacted the remote team to ensure there have been no arrhythmias. He denies chest pain, pressure, palpitations, unusual shortness of breath, orthopnea, LE edema, or syncope. Former smoker. Not currently on an exercise regimen since his ICD implant.\par \par Weights have been steady.\par \par BP 86/58 sitting and 80/59 standing.\par \par Testing:\par \par Echo 6/16/22: EF 20-25%. Moderate LVE. EF 20-25%. Severe global LVSF. Endocardial visualization enhanced with Lumason. Swirling of echo constrast in LV apex c/w slow flow. Irregular rhtyhm may limit accuracy of EF. \par \par ECG: \par 3/25/22: SR, LAFB, poor R wave progression, nonspecific ST abnormalities\par \par 2/11/22: appears sinus rhythm, low voltage in limb leads, LAFB, nonspecific ST abnormalities\par  \par Echo: \par 5/18/22: Limited TTE with Lumason for LV assessment: LVEF 27%, severe LV systolic dysfunction, mild LV enlargement\par \par Labs 3/25/22: WBC 4.93, Hgb 14.7, HCT 44.4, plt 110, Na 136, K 4.4, Cr 0.7, Ca 9.1, AST 40, ALT 46, Mag 2, Trigs 91, Chol 148, HDL 47, LDL 83, \par \par BLLE u/a 2/2/22: No DVT in either lower extremity.\par \par 2/2/22: LVEF <20%, LVIDd 6.3 cm, severely dilated LV, LV diastolic dysfunction, mildly dilated RA and RV, moderate dilated LA, mod TR, mod MR, mild pHTN, small pericardial effusion \par Device/PPM/ICD: \par \par Lifevest since Feb 2022 \par Cardiac Cath/PCI: \par 3/24/22: LHC: PCI to mLAD\par \par 2/2/22 LHC: mLAD 70% stenosis however sig depressed CI with elevated LVEDP. IABP placed and pt transferred to [facility]

## 2022-08-01 NOTE — DISCUSSION/SUMMARY
[FreeTextEntry1] : CORWIN STUBBS is a 82 year old M who presents today Aug 01, 2022 with the above history and the following active issues:\par \par HFrEF (LVEF 27%), ICMP +/- ETOH induced, CAD s/p PCI to mLAD, pAF on Eliquis.\par \par #Chronic systolic HF\par -GDMT: -Continue Valsartan 40mg daily (can consider switching to Entresto once tolerating all 4 classes of medications for HF)\par  -Continue Toprol XL 25 mg daily\par  -Continue Spironolactone 25 mg daily\par  -Continue Farxiga 10mg daily\par -Diuretic: Has not required. Pt aware if >3lb/one day or >5 lb/one week weight gain to take Furosemide PRN however with SGLT2i may not need to\par -Fasting labs ordered.\par \par -Can consider repeat TTE in 3 months once tolerating GDMT. Echo, carotid, abd have been ordered.\par -HF lifestyle modifications including low sodium diet, daily weight and BP log reviewed\par \par #CAD s/p mLAD PCI\par -Remains on Plavix (off ASA to avoid triple therapy in setting of Eliquis)\par -Follows with Dr. Guerrero\par \par #pAF\par -On Toprol, Eliquis\par \par #HTN\par -Appears controlled on above regimen\par \par Minor dizziness and lightheadedness with changes in position on one occasion. Asymptomatic today. Discussion of avoidance of quick changes in position.\par \par Ongoing f/u with PCP.\par \par F/U: obtain echo, carotid, abd and see Dr. Guerrero 10/2022. Patient has upcoming OV with EP and heart failure as well.\par Discussed red flag symptoms, which would warrant sooner or emergent medical evaluation.\par Any questions and concerns were addressed and resolved.\par \par Sincerely,\par Maricarmen Grier [facility]\par Patient's history, testing, and plan was reviewed with supervising physician, Dr. Abilio Pinon\par

## 2022-09-07 ENCOUNTER — APPOINTMENT (OUTPATIENT)
Dept: CARDIOLOGY | Facility: CLINIC | Age: 82
End: 2022-09-07

## 2022-09-07 VITALS
HEIGHT: 69 IN | WEIGHT: 175 LBS | DIASTOLIC BLOOD PRESSURE: 64 MMHG | BODY MASS INDEX: 25.92 KG/M2 | TEMPERATURE: 97.3 F | HEART RATE: 63 BPM | OXYGEN SATURATION: 96 % | SYSTOLIC BLOOD PRESSURE: 104 MMHG

## 2022-09-07 PROCEDURE — 93283 PRGRMG EVAL IMPLANTABLE DFB: CPT

## 2022-09-07 PROCEDURE — 93979 VASCULAR STUDY: CPT

## 2022-09-07 PROCEDURE — 93880 EXTRACRANIAL BILAT STUDY: CPT

## 2022-09-07 PROCEDURE — 93306 TTE W/DOPPLER COMPLETE: CPT

## 2022-09-16 ENCOUNTER — NON-APPOINTMENT (OUTPATIENT)
Age: 82
End: 2022-09-16

## 2022-09-16 ENCOUNTER — APPOINTMENT (OUTPATIENT)
Dept: HEART FAILURE | Facility: CLINIC | Age: 82
End: 2022-09-16

## 2022-09-16 VITALS
SYSTOLIC BLOOD PRESSURE: 112 MMHG | OXYGEN SATURATION: 97 % | TEMPERATURE: 96.9 F | RESPIRATION RATE: 16 BRPM | DIASTOLIC BLOOD PRESSURE: 58 MMHG | WEIGHT: 178 LBS | HEIGHT: 69 IN | BODY MASS INDEX: 26.36 KG/M2

## 2022-09-16 PROCEDURE — 93000 ELECTROCARDIOGRAM COMPLETE: CPT

## 2022-09-16 PROCEDURE — 99214 OFFICE O/P EST MOD 30 MIN: CPT | Mod: 25

## 2022-09-16 NOTE — CARDIOLOGY SUMMARY
[de-identified] : \par 3/25/22: SR, LAFB, poor R wave progression, nonspecific ST abnormalities\par \par 2/11/22: appears sinus rhythm, low voltage in limb leads, LAFB, nonspecific ST abnormalities\par  [de-identified] : 9/22: LVEF 20-25%, LVEDD 6.2, mild MR\par \par 5/18/22: Limited TTE with Lumason for LV assessment: LVEF 27%, severe LV systolic dysfunction, mild LV enlargement\par \par 2/2/22: LVEF <20%, LVIDd 6.3 cm, severely dilated LV, LV diastolic dysfunction, mildly dilated RA and RV, moderate dilated LA, mod TR, mod MR, mild pHTN, small pericardial effusion [de-identified] : \par Lifevest since Feb 2022 [de-identified] : \par 3/24/22: LHC: PCI to mLAD\par \par 2/2/22 LHC: mLAD 70% stenosis however sig depressed CI with elevated LVEDP. IABP placed and pt transferred [facility]

## 2022-09-16 NOTE — DISCUSSION/SUMMARY
[___ Month(s)] : in [unfilled] month(s) [EKG obtained to assist in diagnosis and management of assessed problem(s)] : EKG obtained to assist in diagnosis and management of assessed problem(s)

## 2022-09-16 NOTE — ASSESSMENT
[FreeTextEntry1] :  82 y/o male with HFrEF (LVEF 27%), ICMP +/- ETOH induced, CAD s/p PCI to mLAD, pAF on Eliquis. He is doing well since hospital discharge. He is ACC/AHA Stage C, NYHA class I symptoms. He is euvolemic, normotensive and well perfused with warm extremities. \par \par #Chronic systolic HF\par -GDMT: -Continue Valsartan 40mg daily, will continue with valsartan for now since he is tolerating it well\par            -Continue Toprol XL 25 mg daily\par            -Continue Spironolactone 25 mg daily\par            -Start Farxiga 10mg daily\par -Diuretic: Has not required. Pt aware if >3lb/one day or >5 lb/one week weight gain to take Furosemide PRN however with SGLT2i may not need to\par -Labs: Last set of labs, stable\par -Device: ICD placement by Dr Liang, no significant arrhythmias noted on recent interrogation\par -HF lifestyle modifications including low sodium diet, daily weight and BP log reviewed\par \par #CAD s/p mLAD PCI\par -Remains on Plavix (off ASA to avoid triple therapy in setting of Eliquis)\par -Follows with Dr. Guerrero\par \par #pAF\par -On Toprol, Eliquis\par \par #HTN\par -Appears controlled on above regimen\par \par \par

## 2022-09-16 NOTE — HISTORY OF PRESENT ILLNESS
[FreeTextEntry1] : \par 81 year old male with HFrEF (LVEF 27%), ICMP, CAD s/p PCI to pLAD (2/2/22), pAF (on Eliquis), HTN, ETOH use x40-50 years. He [place] [facility] [place]22 with generalized fatigue, weakness, aches and was found to be in multisystem organ dysfunction. He subsequently developed cardiogenic shock requiring transfer to [facility] with IABP and milrinone infusion. LVEF at the time was 10-15%. He clinically improved and was discharged with a LifeVest. \par \par 5/27/22:  presents to heart failure clinic to establish care. Repeat TTE (5/18) reveals LVEF 27%. He states he feels fatigued most days but overall feels better since hospitalization. He participates in cardiac rehab 3x a week where he walks on the treadmill 15+ minutes and lifts weights. He has cataracts and macular degeneration for which he is being treated for. He still works as an  which he states has a moderate but manageable level of daily stress. His weight at home has been stable at 175 lb. He adheres to his medication regimen and tries to follow a low sodium diet.\par \par 9/16/22: Patient comes in for followup. Since his last visit he had a repeat echo on optimal medical therapy which showed a reduced EF and had an ICD implant. He otherwise feels very good. He has been doing all his daily activities such as housework and gardening without issues of SOB or ORELLANA\par He denies CP, palpitations, SOB, LE swelling, dizziness/lightheadedness, presyncope/syncope.

## 2022-10-31 ENCOUNTER — APPOINTMENT (OUTPATIENT)
Dept: OPHTHALMOLOGY | Facility: CLINIC | Age: 82
End: 2022-10-31
Payer: MEDICARE

## 2022-10-31 ENCOUNTER — NON-APPOINTMENT (OUTPATIENT)
Age: 82
End: 2022-10-31

## 2022-10-31 PROCEDURE — 92014 COMPRE OPH EXAM EST PT 1/>: CPT

## 2022-10-31 PROCEDURE — 92134 CPTRZ OPH DX IMG PST SGM RTA: CPT

## 2022-11-09 ENCOUNTER — RX RENEWAL (OUTPATIENT)
Age: 82
End: 2022-11-09

## 2022-12-05 ENCOUNTER — RX RENEWAL (OUTPATIENT)
Age: 82
End: 2022-12-05

## 2022-12-07 ENCOUNTER — NON-APPOINTMENT (OUTPATIENT)
Age: 82
End: 2022-12-07

## 2022-12-07 ENCOUNTER — APPOINTMENT (OUTPATIENT)
Dept: CARDIOLOGY | Facility: CLINIC | Age: 82
End: 2022-12-07

## 2022-12-07 PROCEDURE — 93296 REM INTERROG EVL PM/IDS: CPT

## 2022-12-07 PROCEDURE — 93295 DEV INTERROG REMOTE 1/2/MLT: CPT

## 2022-12-16 ENCOUNTER — APPOINTMENT (OUTPATIENT)
Dept: CARDIOLOGY | Facility: CLINIC | Age: 82
End: 2022-12-16

## 2022-12-27 ENCOUNTER — APPOINTMENT (OUTPATIENT)
Dept: CARDIOLOGY | Facility: CLINIC | Age: 82
End: 2022-12-27

## 2023-01-27 ENCOUNTER — APPOINTMENT (OUTPATIENT)
Dept: HEART FAILURE | Facility: CLINIC | Age: 83
End: 2023-01-27

## 2023-01-27 ENCOUNTER — APPOINTMENT (OUTPATIENT)
Dept: CARDIOLOGY | Facility: CLINIC | Age: 83
End: 2023-01-27
Payer: MEDICARE

## 2023-01-27 NOTE — HISTORY OF PRESENT ILLNESS
[FreeTextEntry1] : \par 82 year old male with HFrEF (LVEF 27%), ICMP, CAD s/p PCI to pLAD (2/2/22), pAF (on Eliquis), HTN, ETOH use x40-50 years. [place] [facility] [place] with generalized fatigue, weakness, aches and was found to be in multisystem organ dysfunction. He subsequently developed cardiogenic shock requiring transfer to [facility] with IABP and milrinone infusion. LVEF at the time was 10-15%. He clinically improved and was discharged with a LifeVest. \par \par 5/27/22:  presents to heart failure clinic to establish care. Repeat TTE (5/18) reveals LVEF 27%. He states he feels fatigued most days but overall feels better since hospitalization. He participates in cardiac rehab 3x a week where he walks on the treadmill 15+ minutes and lifts weights. He has cataracts and macular degeneration for which he is being treated for. He still works as an  which he states has a moderate but manageable level of daily stress. His weight at home has been stable at 175 lb. He adheres to his medication regimen and tries to follow a low sodium diet.\par \par 9/16/22: Patient comes in for followup. Since his last visit he had a repeat echo on optimal medical therapy which showed a reduced EF and had an ICD implant. He otherwise feels very good. He has been doing all his daily activities such as housework and gardening without issues of SOB or ORELLANA\par \par 1/27/23: \par He denies CP, palpitations, SOB, LE swelling, dizziness/lightheadedness, presyncope/syncope.

## 2023-01-27 NOTE — CARDIOLOGY SUMMARY
[de-identified] : \par 6/7/22: SR PACs, old inferior-apical infarct, old anteroseptal infarct, nonspecific T abnormality\par \par 3/25/22: SR, LAFB, poor R wave progression, nonspecific ST abnormalities\par \par 2/11/22: appears sinus rhythm, low voltage in limb leads, LAFB, nonspecific ST abnormalities\par  [de-identified] : \par 9/22: LVEF 20-25%, LVEDD 6.2, mild MR\par \par 5/18/22: Limited TTE with Lumason for LV assessment: LVEF 27%, severe LV systolic dysfunction, mild LV enlargement\par \par 2/2/22: LVEF <20%, LVIDd 6.3 cm, severely dilated LV, LV diastolic dysfunction, mildly dilated RA and RV, moderate dilated LA, mod TR, mod MR, mild pHTN, small pericardial effusion [de-identified] : \par 6/21/22: s/p [employer] ICD [de-identified] : \par  [de-identified] : \par 3/24/22: LHC: PCI to mLAD\par \par 2/2/22 LHC: mLAD 70% stenosis however sig depressed CI with elevated LVEDP. IABP placed and pt transferred to [facility]

## 2023-01-27 NOTE — ASSESSMENT
[FreeTextEntry1] :  81 y/o male with HFrEF (LVEF 27%), ICMP +/- ETOH induced, CAD s/p PCI to mLAD, pAF on Eliquis. He is doing well since hospital discharge. He is ACC/AHA Stage C, NYHA class I symptoms. He is euvolemic, normotensive and well perfused with warm extremities. \par \par #Chronic systolic HF\par -GDMT: -Continue Valsartan 40mg daily, will continue with valsartan for now since he is tolerating it well\par            -Continue Toprol XL 25 mg daily\par            -Continue Spironolactone 25 mg daily\par            -Start Farxiga 10mg daily\par -Diuretic: Has not required. Pt aware if >3lb/one day or >5 lb/one week weight gain to take Furosemide PRN however with SGLT2i may not need to\par -Labs: Last set of labs, stable\par -Device: ICD placement by Dr Liang, no significant arrhythmias noted on recent interrogation\par -HF lifestyle modifications including low sodium diet, daily weight and BP log reviewed\par \par #CAD s/p mLAD PCI\par -Remains on Plavix (off ASA to avoid triple therapy in setting of Eliquis)\par -Follows with Dr. Guerrero\par \par #pAF\par -On Toprol, Eliquis\par \par #HTN\par -Appears controlled on above regimen\par \par \par

## 2023-02-02 ENCOUNTER — APPOINTMENT (OUTPATIENT)
Dept: CARDIOLOGY | Facility: CLINIC | Age: 83
End: 2023-02-02
Payer: MEDICARE

## 2023-02-02 VITALS
BODY MASS INDEX: 27.4 KG/M2 | DIASTOLIC BLOOD PRESSURE: 60 MMHG | WEIGHT: 185 LBS | OXYGEN SATURATION: 97 % | SYSTOLIC BLOOD PRESSURE: 110 MMHG | TEMPERATURE: 96.9 F | HEART RATE: 46 BPM | HEIGHT: 69 IN

## 2023-02-02 VITALS — HEART RATE: 61 BPM

## 2023-02-02 PROCEDURE — 99214 OFFICE O/P EST MOD 30 MIN: CPT

## 2023-02-02 NOTE — PHYSICAL EXAM
[No Acute Distress] : no acute distress [Normal Conjunctiva] : normal conjunctiva [Normal Venous Pressure] : normal venous pressure [Normal S1, S2] : normal S1, S2 [No Murmur] : no murmur [Clear Lung Fields] : clear lung fields [Soft] : abdomen soft [Non Tender] : non-tender [No Edema] : no edema [Normal Gait] : normal gait [Moves all extremities] : moves all extremities [Alert and Oriented] : alert and oriented [No Respiratory Distress] : no respiratory distress

## 2023-02-02 NOTE — HISTORY OF PRESENT ILLNESS
[FreeTextEntry1] : \par 82 year old male with HFrEF (LVEF 27%), ICMP, CAD s/p PCI to pLAD (2/2/22), pAF (on Eliquis), HTN, ETOH use x40-50 years. He presented to [facility] [place] in February 2022 with generalized fatigue, weakness, aches and was found to be in multisystem organ dysfunction. He subsequently developed cardiogenic shock requiring transfer to [facility] with IABP and milrinone infusion. LVEF at the time was 10-15%. He clinically improved and was discharged with a LifeVest. \par \par 5/27/22:  presents to heart failure clinic to establish care. Repeat TTE (5/18) reveals LVEF 27%. He states he feels fatigued most days but overall feels better since hospitalization. He participates in cardiac rehab 3x a week where he walks on the treadmill 15+ minutes and lifts weights. He has cataracts and macular degeneration for which he is being treated for. He still works as an  which he states has a moderate but manageable level of daily stress. His weight at home has been stable at 175 lb. He adheres to his medication regimen and tries to follow a low sodium diet.\par \par 9/16/22: Patient comes in for followup. Since his last visit he had a repeat echo on optimal medical therapy which showed a reduced EF and had an ICD implant. He otherwise feels very good. He has been doing all his daily activities such as housework and gardening without issues of SOB or ORELLANA.\par \par 2/2/23: Pt seen for routine follow up. He feels well overall and denies overt HF symptoms. He can perform daily activities without significant cardiopulmonary limitations. He has gained ~15lb since our last OV that he states has been a slow and progressive weight gain and attributes to feeling better and with increased appetite. He is going to retire soon and wants to join a gym with a .\par He denies CP, palpitations, SOB, LE swelling, dizziness/lightheadedness, presyncope/syncope.

## 2023-02-02 NOTE — ASSESSMENT
[FreeTextEntry1] : 83 y/o male with HFrEF (LVEF 27%), ICMP +/- ETOH induced, CAD s/p PCI to mLAD, pAF on Eliquis. He presents for routine follow up and is doing well with no overt HF symptoms. He is ACC/AHA Stage C, NYHA class I symptoms. He is euvolemic, normotensive and well perfused with warm extremities. \par \par #Chronic systolic HF\par -GDMT: -Continue Valsartan 40mg daily, will continue with valsartan for now since he is tolerating it well\par            -Continue Toprol XL 25 mg daily. Pt states he had picked up Toprol XL 50mg daily at the pharmacy (unclear why, we have prescribed Toprol XL 25 mg daily). HR in office today 46-60. Recommend continue Toprol XL 25mg daily\par            -Continue Spironolactone 25 mg daily\par            -Continue Farxiga 10mg daily\par -Diuretic: Has not required. Pt aware if >3lb/one day or >5 lb/one week weight gain to take Furosemide PRN however with SGLT2i may not need to\par -Labs: Will repeat labs and lipid panel\par -Device: ICD placement by Dr Liang, no significant arrhythmias noted on recent interrogation\par -HF lifestyle modifications including low sodium diet, daily weight and BP log reviewed\par \par #CAD s/p mLAD PCI\par -Remains on Plavix (off ASA to avoid triple therapy in setting of Eliquis)\par -Follows with Dr. Guerrero\par \par #pAF\par -On Toprol, Eliquis\par \par #HTN\par -Appears controlled on above regimen\par \par Follow up in 4-5 months or sooner if clinical change. Office will call with lab results.

## 2023-02-02 NOTE — CARDIOLOGY SUMMARY
[de-identified] : \par 3/25/22: SR, LAFB, poor R wave progression, nonspecific ST abnormalities\par \par 2/11/22: appears sinus rhythm, low voltage in limb leads, LAFB, nonspecific ST abnormalities\par  [de-identified] : 9/22: LVEF 20-25%, LVEDD 6.2, mild MR\par \par 5/18/22: Limited TTE with Lumason for LV assessment: LVEF 27%, severe LV systolic dysfunction, mild LV enlargement\par \par 2/2/22: LVEF <20%, LVIDd 6.3 cm, severely dilated LV, LV diastolic dysfunction, mildly dilated RA and RV, moderate dilated LA, mod TR, mod MR, mild pHTN, small pericardial effusion [de-identified] : \par 6/21/22: s/p ICD [de-identified] : \par 3/24/22: LHC: PCI to mLAD\par \par 2/2/22 LHC: mLAD 70% stenosis however sig depressed CI with elevated LVEDP. IABP placed and pt transferred [facility]

## 2023-02-02 NOTE — DISCUSSION/SUMMARY
[___ Month(s)] : in [unfilled] month(s) [FreeTextEntry1] : The above problem/s and plan were previously established and followed according to physician's outline. The attending physician was present in the office suite at time of visit.\par

## 2023-02-02 NOTE — ADDENDUM
[FreeTextEntry1] : Please note the patient was reviewed with the NP.\par I was physically present during the service of the patient\regi I was directly involved in the management plan and recommendations of care provided to the patient. \par I personally reviewed the history and physical exam and plan as documented by the NP above.\par \par Herman Ying DO, FACC, RPVI\par Cardiologist\par 02/02/2023

## 2023-02-06 ENCOUNTER — NON-APPOINTMENT (OUTPATIENT)
Age: 83
End: 2023-02-06

## 2023-02-06 ENCOUNTER — APPOINTMENT (OUTPATIENT)
Dept: OPHTHALMOLOGY | Facility: CLINIC | Age: 83
End: 2023-02-06
Payer: MEDICARE

## 2023-02-06 PROCEDURE — 99213 OFFICE O/P EST LOW 20 MIN: CPT

## 2023-02-06 PROCEDURE — 92015 DETERMINE REFRACTIVE STATE: CPT

## 2023-02-25 ENCOUNTER — RX RENEWAL (OUTPATIENT)
Age: 83
End: 2023-02-25

## 2023-03-09 ENCOUNTER — RX RENEWAL (OUTPATIENT)
Age: 83
End: 2023-03-09

## 2023-03-13 ENCOUNTER — NON-APPOINTMENT (OUTPATIENT)
Age: 83
End: 2023-03-13

## 2023-03-13 ENCOUNTER — APPOINTMENT (OUTPATIENT)
Dept: CARDIOLOGY | Facility: CLINIC | Age: 83
End: 2023-03-13
Payer: MEDICARE

## 2023-03-13 PROCEDURE — 93296 REM INTERROG EVL PM/IDS: CPT

## 2023-03-13 PROCEDURE — 93295 DEV INTERROG REMOTE 1/2/MLT: CPT

## 2023-03-14 ENCOUNTER — RX RENEWAL (OUTPATIENT)
Age: 83
End: 2023-03-14

## 2023-03-18 ENCOUNTER — RX RENEWAL (OUTPATIENT)
Age: 83
End: 2023-03-18

## 2023-03-27 ENCOUNTER — RX RENEWAL (OUTPATIENT)
Age: 83
End: 2023-03-27

## 2023-03-30 ENCOUNTER — RX RENEWAL (OUTPATIENT)
Age: 83
End: 2023-03-30

## 2023-04-24 ENCOUNTER — APPOINTMENT (OUTPATIENT)
Dept: CARDIOLOGY | Facility: CLINIC | Age: 83
End: 2023-04-24
Payer: MEDICARE

## 2023-04-24 VITALS
HEIGHT: 69 IN | BODY MASS INDEX: 27.11 KG/M2 | SYSTOLIC BLOOD PRESSURE: 112 MMHG | DIASTOLIC BLOOD PRESSURE: 70 MMHG | HEART RATE: 59 BPM | WEIGHT: 183 LBS | OXYGEN SATURATION: 95 %

## 2023-04-24 PROCEDURE — 99214 OFFICE O/P EST MOD 30 MIN: CPT

## 2023-04-24 RX ORDER — METOPROLOL SUCCINATE 50 MG/1
50 TABLET, EXTENDED RELEASE ORAL DAILY
Qty: 90 | Refills: 3 | Status: DISCONTINUED | COMMUNITY
Start: 2022-02-09 | End: 2023-04-24

## 2023-06-06 ENCOUNTER — APPOINTMENT (OUTPATIENT)
Dept: CARDIOLOGY | Facility: CLINIC | Age: 83
End: 2023-06-06

## 2023-06-12 ENCOUNTER — APPOINTMENT (OUTPATIENT)
Dept: CARDIOLOGY | Facility: CLINIC | Age: 83
End: 2023-06-12
Payer: MEDICARE

## 2023-06-12 ENCOUNTER — NON-APPOINTMENT (OUTPATIENT)
Age: 83
End: 2023-06-12

## 2023-06-12 PROCEDURE — 93295 DEV INTERROG REMOTE 1/2/MLT: CPT

## 2023-06-12 PROCEDURE — 93296 REM INTERROG EVL PM/IDS: CPT

## 2023-06-13 ENCOUNTER — NON-APPOINTMENT (OUTPATIENT)
Age: 83
End: 2023-06-13

## 2023-06-13 ENCOUNTER — APPOINTMENT (OUTPATIENT)
Dept: CARDIOLOGY | Facility: CLINIC | Age: 83
End: 2023-06-13
Payer: MEDICARE

## 2023-06-13 VITALS
SYSTOLIC BLOOD PRESSURE: 112 MMHG | WEIGHT: 188 LBS | HEIGHT: 69 IN | HEART RATE: 57 BPM | BODY MASS INDEX: 27.85 KG/M2 | DIASTOLIC BLOOD PRESSURE: 78 MMHG | OXYGEN SATURATION: 96 %

## 2023-06-13 PROCEDURE — 99214 OFFICE O/P EST MOD 30 MIN: CPT

## 2023-06-13 PROCEDURE — 93000 ELECTROCARDIOGRAM COMPLETE: CPT

## 2023-06-13 NOTE — PHYSICAL EXAM
[No Acute Distress] : no acute distress [Normal Conjunctiva] : normal conjunctiva [Normal Venous Pressure] : normal venous pressure [Normal S1, S2] : normal S1, S2 [No Murmur] : no murmur [Clear Lung Fields] : clear lung fields [No Respiratory Distress] : no respiratory distress  [Soft] : abdomen soft [Non Tender] : non-tender [Normal Gait] : normal gait [No Edema] : no edema [No Rash] : no rash [No Skin Lesions] : no skin lesions [Moves all extremities] : moves all extremities [Alert and Oriented] : alert and oriented

## 2023-06-19 ENCOUNTER — NON-APPOINTMENT (OUTPATIENT)
Age: 83
End: 2023-06-19

## 2023-06-19 ENCOUNTER — APPOINTMENT (OUTPATIENT)
Dept: OPHTHALMOLOGY | Facility: CLINIC | Age: 83
End: 2023-06-19
Payer: MEDICARE

## 2023-06-19 PROCEDURE — 92250 FUNDUS PHOTOGRAPHY W/I&R: CPT

## 2023-06-19 PROCEDURE — 92014 COMPRE OPH EXAM EST PT 1/>: CPT

## 2023-06-19 NOTE — ASSESSMENT
[FreeTextEntry1] : 82 y/o male with HFrEF (LVEF 27%), ICMP +/- ETOH induced, CAD s/p PCI to mLAD, pAF on Eliquis. He presents for routine follow up and is doing well with no overt HF symptoms. He is ACC/AHA Stage C, NYHA class II symptoms. He is euvolemic, normotensive and well perfused with warm extremities. He continues to have weight gain that he attributes to physical deconditioning. We will obtain a 6 minute walk test for objective data and repeat bloodwork. He is scheduled for repeat TTE in September.\par \par #Chronic systolic HF\par -GDMT: -Continue Valsartan 40mg daily, will continue with valsartan for now since he is tolerating it well\par            -Continue Toprol XL 25 mg daily\par            -Continue Spironolactone 25 mg daily\par            -Continue Farxiga 10mg daily\par -Diuretic: Has not required. Pt aware if >3lb/one day or >5 lb/one week weight gain to take Furosemide PRN however with SGLT2i may not need to\par -Labs: Will repeat labs, script provided\par -Device: ICD placement by Dr Liang, no significant arrhythmias noted on recent interrogation\par -HF lifestyle modifications including low sodium diet, daily weight and BP log reviewed\par \par #CAD s/p mLAD PCI\par -Remains on Plavix (off ASA to avoid triple therapy in setting of Eliquis)\par -Follows with Dr. Guerrero\par \par #pAF\par -On Toprol, Eliquis\par \par #HTN\par -Appears controlled on above regimen\par \par Obtain 6 minute walk test for further evaluation.

## 2023-06-19 NOTE — HISTORY OF PRESENT ILLNESS
[FreeTextEntry1] : \par 83 year old male with HFrEF (LVEF 27%), ICMP, CAD s/p PCI to pLAD (2/2/22), pAF (on Eliquis), HTN, ETOH use x40-50 years. He presented to [facility] in February 2022 with generalized fatigue, weakness, aches and was found to be in multisystem organ dysfunction. He subsequently developed cardiogenic shock requiring transfer to [facility] with IABP and milrinone infusion. LVEF at the time was 10-15%. He clinically improved and was discharged with a LifeVest. \par \par 5/27/22:  presents to heart failure clinic to establish care. Repeat TTE (5/18) reveals LVEF 27%. He states he feels fatigued most days but overall feels better since hospitalization. He participates in cardiac rehab 3x a week where he walks on the treadmill 15+ minutes and lifts weights. He has cataracts and macular degeneration for which he is being treated for. He still works as an  which he states has a moderate but manageable level of daily stress. His weight at home has been stable at 175 lb. He adheres to his medication regimen and tries to follow a low sodium diet.\par \par 9/16/22: Patient comes in for followup. Since his last visit he had a repeat echo on optimal medical therapy which showed a reduced EF and underwent ICD implant. He otherwise feels very good. He has been doing all his daily activities such as housework and gardening without issues of SOB or ORELLANA.\par \par 2/2/23: Pt seen for routine follow up. He feels well overall and denies overt HF symptoms. He can perform daily activities without significant cardiopulmonary limitations. He has gained ~15lb since our last OV that he states has been a slow and progressive weight gain and attributes to feeling better and with increased appetite. He is going to retire soon and wants to join a gym with a .\par \par 6/13/23: Pt seen for follow up. He feels well overall. Repeat labs are pending. He endorses he has not been very physically active and attributes weight gain to that. He is compliant with his medication regimen. No ICD shocks or recent hospitalizations.\par He denies CP, palpitations, SOB, LE swelling, dizziness/lightheadedness, presyncope/syncope.

## 2023-06-19 NOTE — CARDIOLOGY SUMMARY
[de-identified] : \par 6/13/23:\par \par 3/25/22: SR, LAFB, poor R wave progression, nonspecific ST abnormalities\par \par 2/11/22: appears sinus rhythm, low voltage in limb leads, LAFB, nonspecific ST abnormalities\par  [de-identified] : \par 9/22: LVEF 20-25%, LVEDD 6.2, mild MR\par \par 5/18/22: Limited TTE with Lumason for LV assessment: LVEF 27%, severe LV systolic dysfunction, mild LV enlargement\par \par 2/2/22: LVEF <20%, LVIDd 6.3 cm, severely dilated LV, LV diastolic dysfunction, mildly dilated RA and RV, moderate dilated LA, mod TR, mod MR, mild pHTN, small pericardial effusion [de-identified] : \par 6/21/22: s/p ICD [de-identified] : \par 3/24/22: LHC: PCI to mLAD\par \par 2/2/22 LHC: mLAD 70% stenosis however sig depressed CI with elevated LVEDP. IABP placed and pt transferred [facility]

## 2023-06-19 NOTE — DISCUSSION/SUMMARY
[___ Month(s)] : in [unfilled] month(s) [EKG obtained to assist in diagnosis and management of assessed problem(s)] : EKG obtained to assist in diagnosis and management of assessed problem(s) [FreeTextEntry1] : The above problem/s and plan were previously established and followed according to physician's outline. The attending physician was present in the office suite at time of visit.\par

## 2023-06-19 NOTE — ADDENDUM
[FreeTextEntry1] : Please note the patient was reviewed with the NP.\par I was physically present during the service of the patient\par I was directly involved in the management plan and recommendations of care provided to the patient.\par I personally reviewed the history and physical exam and plan as documented by the NP above.\par \par Dr. Omer Wheat\par 6/13/23

## 2023-06-20 ENCOUNTER — APPOINTMENT (OUTPATIENT)
Dept: CARDIOLOGY | Facility: CLINIC | Age: 83
End: 2023-06-20
Payer: MEDICARE

## 2023-06-20 VITALS
SYSTOLIC BLOOD PRESSURE: 120 MMHG | WEIGHT: 189 LBS | DIASTOLIC BLOOD PRESSURE: 64 MMHG | OXYGEN SATURATION: 96 % | BODY MASS INDEX: 27.99 KG/M2 | HEIGHT: 69 IN | HEART RATE: 68 BPM

## 2023-06-20 PROCEDURE — 94618 PULMONARY STRESS TESTING: CPT

## 2023-06-20 NOTE — ASSESSMENT
[FreeTextEntry1] : 82 y/o male with HFrEF (LVEF 27%), ICMP +/- ETOH induced, CAD s/p PCI to mLAD, pAF on Eliquis. He presents for routine follow up and is doing well with no overt HF symptoms. He is ACC/AHA Stage C, NYHA class II symptoms. He is euvolemic, normotensive and well perfused with warm extremities. He continues to have weight gain that he attributes to physical deconditioning. Pt completed 6MWT, completed 330 meters. NT proBNP pending. Plan for repeat TTE and carotid US. Pending results, will bring patient back to discuss Barostim placement and its benefits for patient symptoms.\par \par #Completed 6MWT\par -Walked 330 meters, Berta Scale 1 post test. Recovery VS: HR 63, /60, +dyspnea, SpO2 98% room air\par \par #Chronic systolic HF\par -GDMT: -Continue Valsartan 40mg daily, will continue with valsartan for now since he is tolerating it well\par            -Continue Toprol XL 25 mg daily\par            -Continue Spironolactone 25 mg daily\par            -Continue Farxiga 10mg daily\par -Diuretic: Has not required. Pt aware if >3lb/one day or >5 lb/one week weight gain to take Furosemide PRN however with SGLT2i may not need to\par -Labs: Will repeat labs, script provided. Pt states he will get bloodwork tomorrow at [facility]\par -Device: ICD placement by Dr Liang, no significant arrhythmias noted on recent interrogation\par -HF lifestyle modifications including low sodium diet, daily weight and BP log reviewed\par \par #CAD s/p mLAD PCI\par -Remains on Plavix (off ASA to avoid triple therapy in setting of Eliquis)\par -Follows with Dr. Guerrero\par \par #pAF\par -On Toprol, Eliquis\par \par #HTN\par -Appears controlled on above regimen\par \par Follow up after bloodwork and testing (TTE, carotid US). To be completed as soon as possible. Pt states he is planning to undergo dental extractions. Pt is medically optimized to undergo planned dental procedure. No recent unstable coronary syndromes or decompensated heart failure, baseline functional status is acceptable. The clinical benefit of the proposed procedure outweighs the associated cardiovascular risk. Hold Eliquis x48 hours prior to extractions and resume immediately as able.\par \par The patient completed a 6MWT and is being evaluated for candidacy for Barostim placement \par The patient is in class III CHF based on walking approximately 330 meters with a Berta score of 1-2 \par Should have ultrasound of his carotids and a NT proBNP\par Redo his LVEF by 2 D ECHO \par F/U after testing to discuss further options \par \par I spent 35 minutes with the patient including:\par 10 minutes preparing for the visit \par 15 minutes face to face and 10 minutes on documentation and coordination of care \par

## 2023-06-20 NOTE — HISTORY OF PRESENT ILLNESS
[FreeTextEntry1] : \par 83 year old male with HFrEF (LVEF 27%), ICMP, CAD s/p PCI to pLAD (2/2/22), pAF (on Eliquis), HTN, ETOH use x40-50 years. He presented to [facility] in February 2022 with generalized fatigue, weakness, aches and was found to be in multisystem organ dysfunction. He subsequently developed cardiogenic shock requiring transfer to [facility] with IABP and milrinone infusion. LVEF at the time was 10-15%. He clinically improved and was discharged with a LifeVest. \par \par 5/27/22:  presents to heart failure clinic to establish care. Repeat TTE (5/18) reveals LVEF 27%. He states he feels fatigued most days but overall feels better since hospitalization. He participates in cardiac rehab 3x a week where he walks on the treadmill 15+ minutes and lifts weights. He has cataracts and macular degeneration for which he is being treated for. He still works as an  which he states has a moderate but manageable level of daily stress. His weight at home has been stable at 175 lb. He adheres to his medication regimen and tries to follow a low sodium diet.\par \par 9/16/22: Patient comes in for followup. Since his last visit he had a repeat echo on optimal medical therapy which showed a reduced EF and underwent ICD implant. He otherwise feels very good. He has been doing all his daily activities such as housework and gardening without issues of SOB or ORELLANA.\par \par 2/2/23: Pt seen for routine follow up. He feels well overall and denies overt HF symptoms. He can perform daily activities without significant cardiopulmonary limitations. He has gained ~15lb since our last OV that he states has been a slow and progressive weight gain and attributes to feeling better and with increased appetite. He is going to retire soon and wants to join a gym with a .\par \par 6/13/23: Pt seen for follow up. He feels well overall. Repeat labs are pending. He endorses he has not been very physically active and attributes weight gain to that. He is compliant with his medication regimen. No ICD shocks or recent hospitalizations.\par \par 6/20/23: Pt here today for 6 minute walk test. He is scheduled for labwork tomorrow at [facility] in [place]. \par \par He denies CP, palpitations, SOB, LE swelling, dizziness/lightheadedness, presyncope/syncope.

## 2023-06-20 NOTE — REVIEW OF SYSTEMS
[Negative] : Heme/Lymph [FreeTextEntry1] : The remaining 14-point ROS is otherwise negative or non-contributory except as noted in the HPI.

## 2023-06-20 NOTE — CARDIOLOGY SUMMARY
[de-identified] : \par 6/13/23: SR with 1st degree AVB, PRWP, old anterior infarct\par \par 3/25/22: SR, LAFB, poor R wave progression, nonspecific ST abnormalities\par \par 2/11/22: appears sinus rhythm, low voltage in limb leads, LAFB, nonspecific ST abnormalities\par  [de-identified] : \par 9/22: LVEF 20-25%, LVEDD 6.2, mild MR\par \par 5/18/22: Limited TTE with Lumason for LV assessment: LVEF 27%, severe LV systolic dysfunction, mild LV enlargement\par \par 2/2/22: LVEF <20%, LVIDd 6.3 cm, severely dilated LV, LV diastolic dysfunction, mildly dilated RA and RV, moderate dilated LA, mod TR, mod MR, mild pHTN, small pericardial effusion [de-identified] : \par 6/21/22: s/p ICD [de-identified] : \par 3/24/22: LHC: PCI to mLAD\par \par 2/2/22 LHC: mLAD 70% stenosis however sig depressed CI with elevated LVEDP. IABP placed and pt transferred [facility]

## 2023-06-20 NOTE — ADDENDUM
[FreeTextEntry1] : Please note the patient was reviewed with the NP.\par I was physically present during the service of the patient\par I was directly involved in the management plan and recommendations of care provided to the patient.\par I personally reviewed the history and physical exam and plan as documented by the NP above.\par \par Dr. Omer Wheat\par 6/20/23

## 2023-06-21 ENCOUNTER — APPOINTMENT (OUTPATIENT)
Dept: CARDIOLOGY | Facility: CLINIC | Age: 83
End: 2023-06-21
Payer: MEDICARE

## 2023-06-21 PROCEDURE — 76376 3D RENDER W/INTRP POSTPROCES: CPT

## 2023-06-21 PROCEDURE — 93880 EXTRACRANIAL BILAT STUDY: CPT

## 2023-06-21 PROCEDURE — 93306 TTE W/DOPPLER COMPLETE: CPT

## 2023-07-05 ENCOUNTER — APPOINTMENT (OUTPATIENT)
Dept: HEART FAILURE | Facility: CLINIC | Age: 83
End: 2023-07-05
Payer: MEDICARE

## 2023-07-05 VITALS — OXYGEN SATURATION: 95 % | SYSTOLIC BLOOD PRESSURE: 106 MMHG | HEART RATE: 50 BPM | DIASTOLIC BLOOD PRESSURE: 64 MMHG

## 2023-07-05 VITALS — WEIGHT: 187 LBS | BODY MASS INDEX: 27.62 KG/M2

## 2023-07-05 PROCEDURE — 99215 OFFICE O/P EST HI 40 MIN: CPT

## 2023-07-05 NOTE — HISTORY OF PRESENT ILLNESS
[FreeTextEntry1] : \par 83 year old male with HFrEF (LVEF 27%), ICMP, CAD s/p PCI to pLAD (2/2/22), pAF (on Eliquis), HTN, ETOH use x40-50 years. He presented to [facility] in February 2022 with generalized fatigue, weakness, aches and was found to be in multisystem organ dysfunction. He subsequently developed cardiogenic shock requiring transfer to [facility] with IABP and milrinone infusion. LVEF at the time was 10-15%. He clinically improved and was discharged with a LifeVest. \par \par 5/27/22:  presents to heart failure clinic to establish care. Repeat TTE (5/18) reveals LVEF 27%. He states he feels fatigued most days but overall feels better since hospitalization. He participates in cardiac rehab 3x a week where he walks on the treadmill 15+ minutes and lifts weights. He has cataracts and macular degeneration for which he is being treated for. He still works as an  which he states has a moderate but manageable level of daily stress. His weight at home has been stable at 175 lb. He adheres to his medication regimen and tries to follow a low sodium diet.\par \par 9/16/22: Patient comes in for followup. Since his last visit he had a repeat echo on optimal medical therapy which showed a reduced EF and underwent ICD implant. He otherwise feels very good. He has been doing all his daily activities such as housework and gardening without issues of SOB or ORELLANA.\par \par 2/2/23: Pt seen for routine follow up. He feels well overall and denies overt HF symptoms. He can perform daily activities without significant cardiopulmonary limitations. He has gained ~15lb since our last OV that he states has been a slow and progressive weight gain and attributes to feeling better and with increased appetite. He is going to retire soon and wants to join a gym with a .\par \par 6/13/23: Pt seen for follow up. He feels well overall. Repeat labs are pending. He endorses he has not been very physically active and attributes weight gain to that. He is compliant with his medication regimen. No ICD shocks or recent hospitalizations.\par \par 6/20/23: Pt here today for 6 minute walk test. He is scheduled for labwork tomorrow at [facility] in [place]. \par \par He denies CP, palpitations, SOB, LE swelling, dizziness/lightheadedness, presyncope/syncope. \par \par Interval History (07/05/2023) - no interval complaints, carotid arteries were scanned and have less than 50 % stenosis. CHF is well compensated on the patients current regimen. NT pro-BNP is 532. 6MWT the patient completed 330 meters. The patient has an LVEF < 35 %.

## 2023-07-05 NOTE — CARDIOLOGY SUMMARY
[de-identified] : \par 6/13/23: SR with 1st degree AVB, PRWP, old anterior infarct\par \par 3/25/22: SR, LAFB, poor R wave progression, nonspecific ST abnormalities\par \par 2/11/22: appears sinus rhythm, low voltage in limb leads, LAFB, nonspecific ST abnormalities\par  [de-identified] : \par 9/22: LVEF 20-25%, LVEDD 6.2, mild MR\par \par 5/18/22: Limited TTE with Lumason for LV assessment: LVEF 27%, severe LV systolic dysfunction, mild LV enlargement\par \par 2/2/22: LVEF <20%, LVIDd 6.3 cm, severely dilated LV, LV diastolic dysfunction, mildly dilated RA and RV, moderate dilated LA, mod TR, mod MR, mild pHTN, small pericardial effusion [de-identified] : \par 6/21/22: s/p ICD [de-identified] : \par 3/24/22: LHC: PCI to mLAD\par \par 2/2/22 LHC: mLAD 70% stenosis however sig depressed CI with elevated LVEDP. IABP placed and pt transferred [facility]

## 2023-07-05 NOTE — ASSESSMENT
[FreeTextEntry1] : 82 y/o male with HFrEF (LVEF 27%), ICMP +/- ETOH induced, CAD s/p PCI to mLAD, pAF on Eliquis. He presents for routine follow up and is doing well with no overt HF symptoms. He is ACC/AHA Stage C, NYHA class II symptoms. He is euvolemic, normotensive and well perfused with warm extremities. He continues to have weight gain that he attributes to physical deconditioning. Pt completed 6MWT, completed 330 meters. NT proBNP pending. Plan for repeat TTE and carotid US. Pending results, will bring patient back to discuss Barostim placement and its benefits for patient symptoms.\par \par #Completed 6MWT\par -Walked 330 meters, Berta Scale 1 post test. Recovery VS: HR 63, /60, +dyspnea, SpO2 98% room air\par \par #Chronic systolic HF\par -GDMT: -Continue Valsartan 40mg daily, will continue with valsartan for now since he is tolerating it well\par            -Continue Toprol XL 25 mg daily\par            -Continue Spironolactone 25 mg daily\par            -Continue Farxiga 10mg daily\par -Diuretic: Has not required. Pt aware if >3lb/one day or >5 lb/one week weight gain to take Furosemide PRN however with SGLT2i may not need to\par -Labs: Will repeat labs, script provided. Pt states he will get bloodwork tomorrow at [facility]\par -Device: ICD placement by Dr Liang, no significant arrhythmias noted on recent interrogation\par -HF lifestyle modifications including low sodium diet, daily weight and BP log reviewed\par \par #CAD s/p mLAD PCI\par -Remains on Plavix (off ASA to avoid triple therapy in setting of Eliquis)\par -Follows with Dr. Guerrero\par \par #pAF\par -On Toprol, Eliquis\par \par #HTN\par -Appears controlled on above regimen\par \par \par \par The patient completed a 6MWT and is a candidate for Barostim placement \par The patient is in class III CHF based on walking approximately 330 meters with a Berta score of 1-2 \par NT pro-BNP < 1600\par Carotids < 50 % bilaterally\par LVEF < 35 %\par \par I spent 40 minutes with the patient including:\par 10 minutes preparing for the visit \par 20 minutes face to face and 10 minutes on documentation and coordination of care \par

## 2023-07-11 ENCOUNTER — TRANSCRIPTION ENCOUNTER (OUTPATIENT)
Age: 83
End: 2023-07-11

## 2023-08-26 ENCOUNTER — RX RENEWAL (OUTPATIENT)
Age: 83
End: 2023-08-26

## 2023-09-11 ENCOUNTER — APPOINTMENT (OUTPATIENT)
Dept: CARDIOLOGY | Facility: CLINIC | Age: 83
End: 2023-09-11

## 2023-09-12 ENCOUNTER — NON-APPOINTMENT (OUTPATIENT)
Age: 83
End: 2023-09-12

## 2023-09-13 ENCOUNTER — APPOINTMENT (OUTPATIENT)
Dept: CARDIOLOGY | Facility: CLINIC | Age: 83
End: 2023-09-13
Payer: MEDICARE

## 2023-09-13 VITALS
WEIGHT: 190 LBS | DIASTOLIC BLOOD PRESSURE: 64 MMHG | OXYGEN SATURATION: 98 % | SYSTOLIC BLOOD PRESSURE: 120 MMHG | HEART RATE: 42 BPM | BODY MASS INDEX: 28.14 KG/M2 | HEIGHT: 69 IN

## 2023-09-13 DIAGNOSIS — Z01.818 ENCOUNTER FOR OTHER PREPROCEDURAL EXAMINATION: ICD-10-CM

## 2023-09-13 PROCEDURE — 99214 OFFICE O/P EST MOD 30 MIN: CPT

## 2023-09-13 RX ORDER — VIT A/VIT C/VIT E/ZINC/COPPER 2148-113
TABLET ORAL
Refills: 0 | Status: DISCONTINUED | COMMUNITY
End: 2023-09-13

## 2023-09-13 RX ORDER — DAPAGLIFLOZIN 10 MG/1
10 TABLET, FILM COATED ORAL DAILY
Qty: 90 | Refills: 0 | Status: DISCONTINUED | COMMUNITY
Start: 2022-05-27 | End: 2023-09-13

## 2023-09-15 PROBLEM — Z01.818 PRE-OP TESTING: Status: ACTIVE | Noted: 2022-06-13

## 2023-09-18 ENCOUNTER — APPOINTMENT (OUTPATIENT)
Dept: CARDIOLOGY | Facility: CLINIC | Age: 83
End: 2023-09-18

## 2023-09-21 ENCOUNTER — APPOINTMENT (OUTPATIENT)
Dept: CARDIOLOGY | Facility: CLINIC | Age: 83
End: 2023-09-21
Payer: MEDICARE

## 2023-09-21 PROCEDURE — 93306 TTE W/DOPPLER COMPLETE: CPT

## 2023-10-18 ENCOUNTER — APPOINTMENT (OUTPATIENT)
Dept: HEART FAILURE | Facility: CLINIC | Age: 83
End: 2023-10-18
Payer: MEDICARE

## 2023-10-18 VITALS
WEIGHT: 196 LBS | SYSTOLIC BLOOD PRESSURE: 94 MMHG | BODY MASS INDEX: 28.94 KG/M2 | DIASTOLIC BLOOD PRESSURE: 62 MMHG | HEART RATE: 60 BPM | OXYGEN SATURATION: 96 %

## 2023-10-18 PROCEDURE — 99214 OFFICE O/P EST MOD 30 MIN: CPT

## 2023-10-23 ENCOUNTER — APPOINTMENT (OUTPATIENT)
Dept: CARDIOLOGY | Facility: CLINIC | Age: 83
End: 2023-10-23
Payer: MEDICARE

## 2023-10-23 VITALS
WEIGHT: 198 LBS | HEART RATE: 55 BPM | DIASTOLIC BLOOD PRESSURE: 60 MMHG | OXYGEN SATURATION: 95 % | SYSTOLIC BLOOD PRESSURE: 90 MMHG | BODY MASS INDEX: 29.24 KG/M2

## 2023-10-23 PROCEDURE — 99214 OFFICE O/P EST MOD 30 MIN: CPT

## 2023-12-08 ENCOUNTER — RX RENEWAL (OUTPATIENT)
Age: 83
End: 2023-12-08

## 2023-12-11 RX ORDER — VALSARTAN 40 MG/1
40 TABLET, COATED ORAL DAILY
Qty: 90 | Refills: 3 | Status: ACTIVE | COMMUNITY
Start: 2022-02-28 | End: 1900-01-01

## 2023-12-11 RX ORDER — ATORVASTATIN CALCIUM 20 MG/1
20 TABLET, FILM COATED ORAL
Qty: 90 | Refills: 3 | Status: ACTIVE | COMMUNITY
Start: 2022-08-29 | End: 1900-01-01

## 2023-12-11 RX ORDER — SPIRONOLACTONE 25 MG/1
25 TABLET ORAL
Qty: 90 | Refills: 3 | Status: ACTIVE | COMMUNITY
Start: 2022-02-11 | End: 1900-01-01

## 2023-12-11 RX ORDER — METOPROLOL SUCCINATE 25 MG/1
25 TABLET, EXTENDED RELEASE ORAL
Qty: 90 | Refills: 3 | Status: ACTIVE | COMMUNITY
Start: 2022-02-11 | End: 1900-01-01

## 2023-12-11 RX ORDER — DAPAGLIFLOZIN 10 MG/1
10 TABLET, FILM COATED ORAL DAILY
Qty: 90 | Refills: 3 | Status: ACTIVE | COMMUNITY
Start: 1900-01-01 | End: 1900-01-01

## 2023-12-13 ENCOUNTER — NON-APPOINTMENT (OUTPATIENT)
Age: 83
End: 2023-12-13

## 2023-12-13 ENCOUNTER — APPOINTMENT (OUTPATIENT)
Dept: CARDIOLOGY | Facility: CLINIC | Age: 83
End: 2023-12-13
Payer: MEDICARE

## 2023-12-13 PROCEDURE — 93295 DEV INTERROG REMOTE 1/2/MLT: CPT

## 2023-12-13 PROCEDURE — 93296 REM INTERROG EVL PM/IDS: CPT

## 2024-01-04 LAB
OXYHGB MFR BLDMV: SIGNIFICANT CHANGE UP % (ref 90–95)
SAO2 % BLDMV: SIGNIFICANT CHANGE UP %

## 2024-01-09 ENCOUNTER — APPOINTMENT (OUTPATIENT)
Dept: CARDIOLOGY | Facility: CLINIC | Age: 84
End: 2024-01-09
Payer: MEDICARE

## 2024-01-09 VITALS
HEIGHT: 69 IN | WEIGHT: 187 LBS | BODY MASS INDEX: 27.7 KG/M2 | OXYGEN SATURATION: 96 % | HEART RATE: 55 BPM | DIASTOLIC BLOOD PRESSURE: 60 MMHG | SYSTOLIC BLOOD PRESSURE: 116 MMHG

## 2024-01-09 DIAGNOSIS — Z82.49 FAMILY HISTORY OF ISCHEMIC HEART DISEASE AND OTHER DISEASES OF THE CIRCULATORY SYSTEM: ICD-10-CM

## 2024-01-09 DIAGNOSIS — I50.42 CHRONIC COMBINED SYSTOLIC (CONGESTIVE) AND DIASTOLIC (CONGESTIVE) HEART FAILURE: ICD-10-CM

## 2024-01-09 DIAGNOSIS — Z78.9 OTHER SPECIFIED HEALTH STATUS: ICD-10-CM

## 2024-01-09 DIAGNOSIS — I10 ESSENTIAL (PRIMARY) HYPERTENSION: ICD-10-CM

## 2024-01-09 DIAGNOSIS — Z87.448 PERSONAL HISTORY OF OTHER DISEASES OF URINARY SYSTEM: ICD-10-CM

## 2024-01-09 DIAGNOSIS — I48.92 UNSPECIFIED ATRIAL FLUTTER: ICD-10-CM

## 2024-01-09 DIAGNOSIS — R57.0 CARDIOGENIC SHOCK: ICD-10-CM

## 2024-01-09 DIAGNOSIS — Z87.891 PERSONAL HISTORY OF NICOTINE DEPENDENCE: ICD-10-CM

## 2024-01-09 DIAGNOSIS — I51.7 CARDIOMEGALY: ICD-10-CM

## 2024-01-09 DIAGNOSIS — Z98.890 OTHER SPECIFIED POSTPROCEDURAL STATES: ICD-10-CM

## 2024-01-09 DIAGNOSIS — Z95.5 PRESENCE OF CORONARY ANGIOPLASTY IMPLANT AND GRAFT: ICD-10-CM

## 2024-01-09 DIAGNOSIS — I48.91 UNSPECIFIED ATRIAL FIBRILLATION: ICD-10-CM

## 2024-01-09 PROCEDURE — 99214 OFFICE O/P EST MOD 30 MIN: CPT

## 2024-01-15 ENCOUNTER — APPOINTMENT (OUTPATIENT)
Dept: OPHTHALMOLOGY | Facility: CLINIC | Age: 84
End: 2024-01-15

## 2024-01-22 NOTE — CARDIOLOGY SUMMARY
[de-identified] : \par  6/13/23: SR with 1st degree AVB, PRWP, old anterior infarct\par  \par  3/25/22: SR, LAFB, poor R wave progression, nonspecific ST abnormalities\par  \par  2/11/22: appears sinus rhythm, low voltage in limb leads, LAFB, nonspecific ST abnormalities\par   [de-identified] : \par  9/22: LVEF 20-25%, LVEDD 6.2, mild MR\par  \par  5/18/22: Limited TTE with Lumason for LV assessment: LVEF 27%, severe LV systolic dysfunction, mild LV enlargement\par  \par  2/2/22: LVEF <20%, LVIDd 6.3 cm, severely dilated LV, LV diastolic dysfunction, mildly dilated RA and RV, moderate dilated LA, mod TR, mod MR, mild pHTN, small pericardial effusion [de-identified] : \par  6/21/22: s/p ICD [de-identified] : \par  3/24/22: LHC: PCI to mLAD\par  \par  2/2/22 LHC: mLAD 70% stenosis however sig depressed CI with elevated LVEDP. IABP placed and pt transferred [facility]

## 2024-01-22 NOTE — CARDIOLOGY SUMMARY
[de-identified] : \par  6/13/23: SR with 1st degree AVB, PRWP, old anterior infarct\par  \par  3/25/22: SR, LAFB, poor R wave progression, nonspecific ST abnormalities\par  \par  2/11/22: appears sinus rhythm, low voltage in limb leads, LAFB, nonspecific ST abnormalities\par   [de-identified] : \par  9/22: LVEF 20-25%, LVEDD 6.2, mild MR\par  \par  5/18/22: Limited TTE with Lumason for LV assessment: LVEF 27%, severe LV systolic dysfunction, mild LV enlargement\par  \par  2/2/22: LVEF <20%, LVIDd 6.3 cm, severely dilated LV, LV diastolic dysfunction, mildly dilated RA and RV, moderate dilated LA, mod TR, mod MR, mild pHTN, small pericardial effusion [de-identified] : \par  6/21/22: s/p ICD [de-identified] : \par  3/24/22: LHC: PCI to mLAD\par  \par  2/2/22 LHC: mLAD 70% stenosis however sig depressed CI with elevated LVEDP. IABP placed and pt transferred [facility]

## 2024-01-22 NOTE — HISTORY OF PRESENT ILLNESS
[FreeTextEntry1] : 83-year-old male with HFrEF (LVEF 27%), ICMP, CAD s/p PCI to pLAD (2/2/22), pAF (on Eliquis), HTN, ETOH use x40-50 years. He presented to [facility] in February 2022 with generalized fatigue, weakness, aches and was found to be in multisystem organ dysfunction. He subsequently developed cardiogenic shock requiring transfer to [facility] with IABP and milrinone infusion. LVEF at the time was 10-15%. He clinically improved and was discharged with a LifeVest.   5/27/22:  presents to heart failure clinic to establish care. Repeat TTE (5/18) reveals LVEF 27%. He states he feels fatigued most days but overall feels better since hospitalization. He participates in cardiac rehab 3x a week where he walks on the treadmill 15+ minutes and lifts weights. He has cataracts and macular degeneration for which he is being treated for. He still works as an  which he states has a moderate but manageable level of daily stress. His weight at home has been stable at 175 lb. He adheres to his medication regimen and tries to follow a low sodium diet.  9/16/22: Patient comes in for follow-up. Since his last visit he had a repeat echo on optimal medical therapy which showed a reduced EF and underwent ICD implant. He otherwise feels very good. He has been doing all his daily activities such as housework and gardening without issues of SOB or ORELLANA.  2/2/23: Pt seen for routine follow up. He feels well overall and denies overt HF symptoms. He can perform daily activities without significant cardiopulmonary limitations. He has gained ~15lb since our last OV that he states has been a slow and progressive weight gain and attributes to feeling better and with increased appetite. He is going to retire soon and wants to join a gym with a .  6/13/23: Pt seen for follow up. He feels well overall. Repeat labs are pending. He endorses he has not been very physically active and attributes weight gain to that. He is compliant with his medication regimen. No ICD shocks or recent hospitalizations.  6/20/23: Pt here today for 6-minute walk test. He is scheduled for lab work tomorrow at [facility] in [place].   He denies CP, palpitations, SOB, LE swelling, dizziness/lightheadedness, presyncope/syncope.   Interval History (07/05/2023) - no interval complaints, carotid arteries were scanned and have less than 50 % stenosis. CHF is well compensated on the patient's current regimen. NT pro-BNP is 532. 6MWT the patient completed 330 meters. The patient has an LVEF < 35 %.   Interval History (09/13/2023) - No interval complaints. The patient has been dealing with several tooth issues and skin cancer. Wants to delay the neuromodulation procedure until the dust settles.  Interval History (01/09/2023) - No interval complaints. Doing well.

## 2024-01-22 NOTE — DISCUSSION/SUMMARY
[___ Month(s)] : in [unfilled] month(s) [FreeTextEntry1] : Patient with. class III CHF based on 6 MWT Patient qualifies for a Barostim  Patient requests a call from a patient that has undergone procedure.   Discussed patient with Dr. Guerrero.   I spent 35 minutes with the patient includin minutes preparing for the visit. 25 minutes face to face and  5 minutes on documentation and coordination of care.

## 2024-01-22 NOTE — HISTORY OF PRESENT ILLNESS
[FreeTextEntry1] : 83-year-old male with HFrEF (LVEF 27%), ICMP, CAD s/p PCI to pLAD (2/2/22), pAF (on Eliquis), HTN, ETOH use x40-50 years. He presented to [facility] in February 2022 with generalized fatigue, weakness, aches and was found to be in multisystem organ dysfunction. He subsequently developed cardiogenic shock requiring transfer to [facility] with IABP and milrinone infusion. LVEF at the time was 10-15%. He clinically improved and was discharged with a LifeVest.   5/27/22:  presents to heart failure clinic to establish care. Repeat TTE (5/18) reveals LVEF 27%. He states he feels fatigued most days but overall feels better since hospitalization. He participates in cardiac rehab 3x a week where he walks on the treadmill 15+ minutes and lifts weights. He has cataracts and macular degeneration for which he is being treated for. He still works as an  which he states has a moderate but manageable level of daily stress. His weight at home has been stable at 175 lb. He adheres to his medication regimen and tries to follow a low sodium diet.  9/16/22: Patient comes in for follow-up. Since his last visit he had a repeat echo on optimal medical therapy which showed a reduced EF and underwent ICD implant. He otherwise feels very good. He has been doing all his daily activities such as housework and gardening without issues of SOB or ORELLANA.  2/2/23: Pt seen for routine follow up. He feels well overall and denies overt HF symptoms. He can perform daily activities without significant cardiopulmonary limitations. He has gained ~15lb since our last OV that he states has been a slow and progressive weight gain and attributes to feeling better and with increased appetite. He is going to retire soon and wants to join a gym with a .  6/13/23: Pt seen for follow up. He feels well overall. Repeat labs are pending. He endorses he has not been very physically active and attributes weight gain to that. He is compliant with his medication regimen. No ICD shocks or recent hospitalizations.  6/20/23: Pt here today for 6-minute walk test. He is scheduled for lab work tomorrow at [facility] in [place].   He denies CP, palpitations, SOB, LE swelling, dizziness/lightheadedness, presyncope/syncope.   Interval History (07/05/2023) - no interval complaints, carotid arteries were scanned and have less than 50 % stenosis. CHF is well compensated on the patient's current regimen. NT pro-BNP is 532. 6MWT the patient completed 330 meters. The patient has an LVEF < 35 %.   Interval History (09/13/2023) - No interval complaints. The patient has been dealing with several tooth issues and skin cancer. Wants to delay the neuromodulation procedure until the dust settles.  Interval History (01/09/2023) - No interval complaints. Doing well.

## 2024-01-27 ENCOUNTER — RX RENEWAL (OUTPATIENT)
Age: 84
End: 2024-01-27

## 2024-01-27 RX ORDER — CLOPIDOGREL BISULFATE 75 MG/1
75 TABLET, FILM COATED ORAL DAILY
Qty: 90 | Refills: 3 | Status: ACTIVE | COMMUNITY
Start: 2022-03-24 | End: 1900-01-01

## 2024-01-28 ENCOUNTER — RX RENEWAL (OUTPATIENT)
Age: 84
End: 2024-01-28

## 2024-02-22 ENCOUNTER — APPOINTMENT (OUTPATIENT)
Dept: CARDIOLOGY | Facility: CLINIC | Age: 84
End: 2024-02-22
Payer: MEDICARE

## 2024-02-22 VITALS
HEART RATE: 54 BPM | RESPIRATION RATE: 16 BRPM | WEIGHT: 194 LBS | DIASTOLIC BLOOD PRESSURE: 60 MMHG | BODY MASS INDEX: 28.73 KG/M2 | HEIGHT: 69 IN | OXYGEN SATURATION: 98 % | SYSTOLIC BLOOD PRESSURE: 108 MMHG

## 2024-02-22 PROCEDURE — 99214 OFFICE O/P EST MOD 30 MIN: CPT

## 2024-02-22 PROCEDURE — 93000 ELECTROCARDIOGRAM COMPLETE: CPT

## 2024-02-22 NOTE — REVIEW OF SYSTEMS
[Negative] : Heme/Lymph [Feeling Fatigued] : feeling fatigued [Joint Pain] : joint pain [Joint Stiffness] : joint stiffness [Muscle Cramps] : muscle cramps [FreeTextEntry1] : The remaining 14-point ROS is otherwise negative or non-contributory except as noted in the HPI.

## 2024-02-22 NOTE — PHYSICAL EXAM
[No Acute Distress] : no acute distress [Normal Conjunctiva] : normal conjunctiva [Normal Venous Pressure] : normal venous pressure [Normal S1, S2] : normal S1, S2 [No Murmur] : no murmur [No Respiratory Distress] : no respiratory distress  [Clear Lung Fields] : clear lung fields [Soft] : abdomen soft [Non Tender] : non-tender [No Edema] : no edema [Normal Gait] : normal gait [No Rash] : no rash [No Skin Lesions] : no skin lesions [Alert and Oriented] : alert and oriented [Moves all extremities] : moves all extremities

## 2024-02-22 NOTE — DISCUSSION/SUMMARY
[EKG obtained to assist in diagnosis and management of assessed problem(s)] : EKG obtained to assist in diagnosis and management of assessed problem(s) [FreeTextEntry1] : CORWIN STUBBS is a 83 year old M who presents today Feb 22, 2024 with the above history and the following active issues: Pt is cleared for carpal tunnel surgery from a cardiac standpoint. Medication instructions given to pt.  Hold Plavix 5 days Hold Eliquis for 4 doses. Restatr according to surgeon. Pt has several implications for Amyloid. Will schedule a PYP CT scan and Fu

## 2024-02-22 NOTE — HISTORY OF PRESENT ILLNESS
[FreeTextEntry1] : 83-year-old male with HFrEF (LVEF 27%), ICMP, CAD s/p PCI to pLAD (2/2/22), pAF (on Eliquis), HTN, ETOH use x40-50 years. He presented to [place] in February 2022 with generalized fatigue, weakness, aches and was found to be in multisystem organ dysfunction. He subsequently developed cardiogenic shock requiring transfer to [facility] with IABP and milrinone infusion. LVEF at the time was 10-15%. 2/2024  He has improved  EF 35 %. LVID 6.7 cm He is here for cardiac clearance for Carpal tunnel surgery. S/he denies chest pain, pressure, palpitations, unusual shortness of breath, orthopnea, LE edema, lightheadedness, dizziness, near syncope or syncope. No ICD shock.     9 2/2/23: Pt seen for routine follow up. He feels well overall and denies overt HF symptoms. He can perform daily activities without significant cardiopulmonary limitations. He has gained ~15lb since our last OV that he states has been a slow and progressive weight gain and attributes to feeling better and with increased appetite. He is going to retire soon and wants to join a gym with a .  6/13/23: Pt seen for follow up. He feels well overall. Repeat labs are pending. He endorses he has not been very physically active and attributes weight gain to that. He is compliant with his medication regimen. No ICD shocks or recent hospitalizations.  6/20/23: Pt here today for 6-minute walk test. He is scheduled for lab work tomorrow at [facility] in [place].   He denies CP, palpitations, SOB, LE swelling, dizziness/lightheadedness, presyncope/syncope.   Interval History (07/05/2023) - no interval complaints, carotid arteries were scanned and have less than 50 % stenosis. CHF is well compensated on the patient's current regimen. NT pro-BNP is 532. 6MWT the patient completed 330 meters. The patient has an LVEF < 35 %.   Interval History (09/13/2023) - No interval complaints. The patient has been dealing with several tooth issues and skin cancer. Wants to delay the neuromodulation procedure until the dust settles.  Interval History (01/09/2023) - No interval complaints. Doing well.

## 2024-03-12 ENCOUNTER — NON-APPOINTMENT (OUTPATIENT)
Age: 84
End: 2024-03-12

## 2024-03-13 ENCOUNTER — APPOINTMENT (OUTPATIENT)
Dept: CARDIOLOGY | Facility: CLINIC | Age: 84
End: 2024-03-13
Payer: MEDICARE

## 2024-03-13 PROCEDURE — 93296 REM INTERROG EVL PM/IDS: CPT

## 2024-03-13 PROCEDURE — 93295 DEV INTERROG REMOTE 1/2/MLT: CPT

## 2024-03-27 ENCOUNTER — APPOINTMENT (OUTPATIENT)
Dept: CARDIOLOGY | Facility: CLINIC | Age: 84
End: 2024-03-27
Payer: MEDICARE

## 2024-03-27 PROCEDURE — 93015 CV STRESS TEST SUPVJ I&R: CPT

## 2024-03-27 PROCEDURE — 78803 RP LOCLZJ TUM SPECT 1 AREA: CPT | Mod: 59

## 2024-03-27 PROCEDURE — 78452 HT MUSCLE IMAGE SPECT MULT: CPT

## 2024-03-27 PROCEDURE — A9502: CPT

## 2024-03-27 NOTE — PATIENT PROFILE ADULT - FALL HARM RISK - PATIENT NEEDS ASSISTANCE
EGD Procedure Note        Patient: Sobeida Duncan MRN: [id number]  SSN: [id number]    YOB: 1959  Age: 64 y.o.  Sex: adult        Date/Time:  3/27/2024 12:34 PM         IMPRESSION:       Distal esophagitis (grade 2)  Lower esophageal stricture  Antral gastritis       RECOMMENDATIONS:    Check biopsy results.  Repeat esophageal dilatation as needed.  Continue the pantoprazole 40 mg daily.    Procedure: Esophagogastroduodenoscopy with cold biopsy                      Esophageal dilatation    Indication: Esophageal dysphagia    Endoscopist:  Ashleigh Burr Jr, MD    Referring Provider:   Marcella Esquivel APRN - NP    History: The history and physical exam were reviewed and updated.     Endoscope: GIF H190 Olympus video endoscope    Extent of Exam: third portion of the duodenum    ASA: ASA 2 - Patient with mild systemic disease with no functional limitations    Anethesia/Sedation:  TIVA    Description of the procedure:   The procedure was discussed with the patient including risks, benefits, alternatives including risks of iv sedation, bleeding, perforation and aspiration.  A safety timeout was performed. The patient was placed in the left lateral decubitus position.  A bite block was placed.  The patient was using standard protocol.  The patients vital signs were monitored at all times including heart rate/rhythm, blood pressure and oxygen saturation.  The endoscope was then passed under direct visualization to the third portion of the duodenum.  The endoscope was then slowly withdrawn while visualizing the mucosa.  In the stomach a retroflexion was performed and gastric fundus and cardia visualized. The patient was then transferred to recovery in stable condition.                Findings:   Esophagus:The esophageal mucosa was increased inflammation of the distal esophagus with a lower esophageal stricture.  This was the area that was to be dilated..  Stomach: The gastric mucosa was  Standing/Walking/Toileting

## 2024-03-28 ENCOUNTER — APPOINTMENT (OUTPATIENT)
Dept: CARDIOLOGY | Facility: CLINIC | Age: 84
End: 2024-03-28

## 2024-03-29 ENCOUNTER — APPOINTMENT (OUTPATIENT)
Dept: HEART FAILURE | Facility: CLINIC | Age: 84
End: 2024-03-29
Payer: MEDICARE

## 2024-03-29 VITALS
WEIGHT: 192 LBS | OXYGEN SATURATION: 97 % | HEIGHT: 69 IN | HEART RATE: 61 BPM | SYSTOLIC BLOOD PRESSURE: 106 MMHG | DIASTOLIC BLOOD PRESSURE: 66 MMHG | BODY MASS INDEX: 28.44 KG/M2

## 2024-03-29 DIAGNOSIS — I48.0 PAROXYSMAL ATRIAL FIBRILLATION: ICD-10-CM

## 2024-03-29 PROCEDURE — 99215 OFFICE O/P EST HI 40 MIN: CPT

## 2024-03-29 NOTE — PHYSICAL EXAM
[No Respiratory Distress] : no respiratory distress  [Normal] : soft, non-tender, no masses/organomegaly, normal bowel sounds [Normal Gait] : normal gait [No Edema] : no edema [No Rash] : no rash [Moves all extremities] : moves all extremities [Alert and Oriented] : alert and oriented [de-identified] : JVP at clavicle [de-identified] : warm

## 2024-03-29 NOTE — ASSESSMENT
[FreeTextEntry1] : 84 yo M with HFrEF, CAD s/p PCI to pLAD (3/2022), amyloid (undefined type), pAF (on Eliquis), HTN, ETOH use x40-50 years presents today for heart failure follow-up.  He has ACC/AHA stage C cardiomyopathy with NYHA class II symptoms.

## 2024-03-29 NOTE — CARDIOLOGY SUMMARY
[de-identified] :  6/13/23: SR with 1st degree AVB, PRWP, old anterior infarct 3/25/22: SR, LAFB, poor R wave progression, nonspecific ST abnormalities  2/11/22: appears sinus rhythm, low voltage in limb leads, LAFB, nonspecific ST abnormalities  [de-identified] :  6/21/2023: LVEF 30%, LVEDD 6.0 cm, IVSd 1.0 cm, normal RV, mild LAE, mod MR, mod TR, PASP 29, 9/7/2022 : LVEF 20-25%, LVEDD 6.2, IVSD 0.8 cm, mild MR, mild TR, PASP 36 mmHg. 5/18/22: Limited TTE with Lumason for LV assessment: LVEF 27%, severe LV systolic dysfunction, mild LV enlargement 2/2/22: LVEF <20%, LVIDd 6.3 cm, severely dilated LV, LV diastolic dysfunction, mildly dilated RA and RV, moderate dilated LA, mod TR, mod MR, mild pHTN, small pericardial effusion [de-identified] : PYP amyloid scan 3/27/2024: Grade 1 uptake, H: CL ratio of 1.28 (eqivocal). [de-identified] :  6/21/22: s/p ICD [de-identified] : 3/24/22: LHC: PCI to mLAD 2/2/22 LHC: mLAD 70% stenosis however sig depressed CI with elevated LVEDP. IABP placed and pt [facility] [de-identified] : 6MWT 6/20/23: Walked 330 meters, Berta Scale 1 post test. Recovery VS: HR 63, /60, +dyspnea, SpO2 98% room air

## 2024-03-29 NOTE — HISTORY OF PRESENT ILLNESS
[FreeTextEntry1] : 84 yo M with HFrEF, CAD s/p PCI to pLAD (3/2022), amyloid (undefined type), pAF (on Eliquis), HTN, ETOH use x40-50 years presents today for heart failure follow-up.  Patient initially presented to [facility] in February 2022 with generalized fatigue and was found to be cardiogenic shock 2/2 MI requiring transfer to [facility] with IABP and milrinone infusion. LVEF at the time was 10-15%.  He underwent staged PCI on 3/4/2022 to his mid LAD he clinically improved and was discharged with a LifeVest.  LV function recovered only to 27% and he ultimately underwent ICD implant on 6/21/2022.   He did complete cardiac rehab.  He has remained stable without any further hospitalizations.  6MWT on 6/20/23 was 330m.  He was evaluated for neuromodulation therapy however he deferred.  More recently, patient was sent for carpal tunnel surgery.  Patient had heard about amyloid through a TV commercial and on his preoperative visit had requested surgical pathology be sent for amyloid testing.  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 He has completed his PYP scan.  He has yet to get his AL amyloid blood work testing completed.  Today, he reports feeling well.  He is able to walk up and down steps as well as to his car without any difficulties.  He can perform all ADLs.  He denies any fluid overload symptoms.  He denies chest pain, palpitations, dyspnea at rest or exertion, orthopnea, leg swelling, changes in appetite, changes in weight, bendopnea, lightheadedness, dizziness, and syncope/pre-syncope.

## 2024-03-29 NOTE — DISCUSSION/SUMMARY
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 - PYP amyloid scan 3/27/2024: Grade 1 uptake, H: CL ratio of 1.28.  This would be excepted as equivocal result for cardiac TTR amyloid.  To further explore this, will send patient for cardiac MRI. 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 -Pending AL amyloid blood work. -After above testing we will be able to type his amyloid and then discuss treatment options.  # HFrEF  - ETIOLOGY: Secondary to ischemic cardiomyopathy +/- cardiac amyloid. -Cardiac MRI will help define his current LV function.   - GDMT: -Currently on metoprolol 25 mg XL daily, valsartan 40 mg daily, spironolactone 25 mg daily, Farxiga 10 mg daily. -I do not feel that patient will be able to tolerate Entresto given his euvolemic volume status and possible cardiac amyloid. -Heart rate today 61.  Will not be able to uptitrate metoprolol.   - If he does indeed have cardiac amyloid, will need to be mindful of medication titration.   - DIURETICS: Euvolemic on exam.  Can continue off of diuretics.  - LABS: -2/20/2024: Creatinine 0.8, K5.0.  -Repeat labs with proBNP pending.   - DEVICE: ICD placement by Dr Liang, no significant arrhythmias noted on recent interrogation   - ADVANCED THERAPIES: Overall, patient has good quality of life with good exercise tolerance.  He does not feel that there is anything to improve with his current symptoms. Patient has explored neuromodulation options and currently defers these. May want to consider further risk stratifying his cardiomyopathy with a CPET after his amyloid diagnosis is clarified.   #CAD s/p mLAD PCI -Remains on Plavix (off ASA to avoid triple therapy in setting of Eliquis) -Follows with Dr. Guerrero  #pAF -Heart rate well-controlled.  - Continue metoprolol 25 mg XL daily.  AC: Eliquis 5 mg twice daily.  #HTN -Appears controlled on above regimen  Follow-up after above testing.

## 2024-04-21 ENCOUNTER — RX RENEWAL (OUTPATIENT)
Age: 84
End: 2024-04-21

## 2024-04-23 RX ORDER — APIXABAN 5 MG/1
5 TABLET, FILM COATED ORAL
Qty: 180 | Refills: 0 | Status: ACTIVE | COMMUNITY
Start: 2022-02-11 | End: 1900-01-01

## 2024-04-29 NOTE — HISTORY OF PRESENT ILLNESS
Refill request  meloxicam (MOBIC) 15 MG tablet 30 tablet 5 10/17/2023 --    Sig - Route: Take 1 tablet by mouth daily as needed for Pain.      BILATERAL TOTAL KNEE ARTHROPLASTY 9/28/20  Last seen by Dr. Ronquillo 9/29/21   [FreeTextEntry1] : Patient is an 82-year-old practicing  who is seen in evaluation for possible ICD implant.  He currently has a LifeVest.\par The patient presented February 2022 to [facility] with symptoms of shortness of breath/fatigue/weakness.  He was noted to have severe left ventricular dysfunction evidence of neurogenic shock and was transferred to [facility] where an IABP was placed.  He was treated for cardiogenic shock on milrinone infusion.  His EF then was 10 to 15%.  Patient improved and was discharged with LifeVest.  Follow-up ejection fraction of 5/18/2022 showed EF 27%.\par \par Cardiac catheterization 3/24/2022: He had PCI to the mid LAD.  He had a 7% mid LAD stenosis.\par \par EF 27 %\par I discussed the defibrillator with the patient and the pros and cons I am in favor of the implantation device.  The patient will discuss with his wife and call us back regarding his final decision.  In the meantime he will continue to wear the LifeVest and continue on his current medications.  Initial chest pain dizziness and lightheadedness no fatigue\par \par Patient is an  and is waiting on his practice.  He is feeling well he does not have shortness of breath or chest pain.

## 2024-05-15 ENCOUNTER — APPOINTMENT (OUTPATIENT)
Dept: HEART FAILURE | Facility: CLINIC | Age: 84
End: 2024-05-15
Payer: MEDICARE

## 2024-05-15 VITALS
OXYGEN SATURATION: 98 % | WEIGHT: 185 LBS | HEART RATE: 57 BPM | DIASTOLIC BLOOD PRESSURE: 54 MMHG | HEIGHT: 69 IN | SYSTOLIC BLOOD PRESSURE: 100 MMHG | BODY MASS INDEX: 27.4 KG/M2

## 2024-05-15 DIAGNOSIS — E85.9 AMYLOIDOSIS, UNSPECIFIED: ICD-10-CM

## 2024-05-15 DIAGNOSIS — I50.20 UNSPECIFIED SYSTOLIC (CONGESTIVE) HEART FAILURE: ICD-10-CM

## 2024-05-15 DIAGNOSIS — I25.10 ATHEROSCLEROTIC HEART DISEASE OF NATIVE CORONARY ARTERY W/OUT ANGINA PECTORIS: ICD-10-CM

## 2024-05-15 PROCEDURE — 99215 OFFICE O/P EST HI 40 MIN: CPT

## 2024-05-15 NOTE — HISTORY OF PRESENT ILLNESS
[FreeTextEntry1] : 84 yo M with HFrEF, CAD s/p PCI to pLAD (3/2022), amyloid (undefined type), pAF (on Eliquis), HTN, ETOH use x40-50 years presents today for heart failure follow-up.  [place] [facility] [place] with generalized fatigue and was found to be cardiogenic shock 2/2 MI requiring transfer to [facility] with IABP and milrinone infusion. LVEF at the time was 10-15%.  He underwent staged PCI on 3/4/2022 to his mid LAD he clinically improved and was discharged with a LifeVest.  LV function recovered only to 27% and he ultimately underwent ICD implant on 6/21/2022.   He did complete cardiac rehab.  He has remained stable without any further hospitalizations.  6MWT on 6/20/23 was 330m.  He was evaluated for neuromodulation therapy however he deferred.  More recently, patient was sent for carpal tunnel surgery.  Patient had heard about amyloid through a TV commercial and on his preoperative visit had requested surgical pathology be sent for amyloid testing.  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 PYP amyloid scan on 3/27/2024 was eqivocal with Grade 1 uptake, H: CL ratio of 1.28 (eqivocal).  I last saw him on 3/29/2024.  I had ordered cardiac MRI as well as AL amyloid blood work.  Genetic testing was also performed.  He is here today to review the results.  Today, he reports feeling well.  He is able to walk up and down steps as well as to his car without any difficulties.  He can perform all ADLs.  He denies any fluid overload symptoms.  He denies chest pain, palpitations, dyspnea at rest or exertion, orthopnea, leg swelling, changes in appetite, changes in weight, bendopnea, lightheadedness, dizziness, and syncope/pre-syncope.  3 2 = A lot of assistance

## 2024-05-15 NOTE — CARDIOLOGY SUMMARY
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 5/18/22: Limited TTE with Lumason for LV assessment: LVEF 27%, severe LV systolic dysfunction, mild LV enlargement 2/2/22: LVEF <20%, LVIDd 6.3 cm, severely dilated LV, LV diastolic dysfunction, mildly dilated RA and RV, moderate dilated LA, mod TR, mod MR, mild pHTN, small pericardial effusion [de-identified] : cMRI 5/3/2024: LVEF 40%, RVEF 46%, DL E assessment limited due to artifact from ICD, patchy myocardial scar involving mid myocardium of the basal septum, basal anterior wall and basal inferoseptum at RV insertion point. [de-identified] : PYP amyloid scan 3/27/2024: Grade 1 uptake, H:CL ratio of 1.28 (eqivocal). [de-identified] : 6/21/22: s/p ICD [de-identified] : 3/24/22: LHC: PCI to mLAD 2/2/22 LHC: mLAD 70% stenosis however sig depressed CI with elevated LVEDP. IABP placed and pt [facility] [de-identified] : 6MWT 6/20/23: Walked 330 meters, Berta Scale 1 post test. Recovery VS: HR 63, /60, +dyspnea, SpO2 98% room air Genetic testing 4/8/2024: H TTR gene sequence negative.

## 2024-05-15 NOTE — PHYSICAL EXAM
[No Respiratory Distress] : no respiratory distress  [Normal] : soft, non-tender, no masses/organomegaly, normal bowel sounds [Normal Gait] : normal gait [No Edema] : no edema [No Rash] : no rash [Moves all extremities] : moves all extremities [Alert and Oriented] : alert and oriented [de-identified] : JVP at clavicle [de-identified] : warm

## 2024-05-15 NOTE — DISCUSSION/SUMMARY
[___ Month(s)] : in [unfilled] month(s) [FreeTextEntry1] : # WT - TTR Amyloid -Surgical pathology from carpal tunnel surgery on 3/1/2024 was positive for amyloid with [place] red stain with apple green birefringence. - PYP amyloid scan 3/27/2024: Grade 1 uptake, H: CL ratio of 1.28.  This would be considered an equivocal result for cardiac TTR amyloid.  cardiac MRI does have some midmyocardial LGE in the basal septum, basilar anterior wall and basal inferoseptal and RV insertion point. -genetic testing 4/8/2024 negative for hereditary TTR amyloid. -AL labs: K/L ratio 1.53, K just slightly above ULN 20 (ULN 19).  No M spike present. 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  Will proceed with prior authorization process.  Nursing team to work on this.  # HFrEF  - ETIOLOGY: Secondary to ischemic cardiomyopathy +/- cardiac amyloid. -Cardiac MRI with LVEF around 40%.   - GDMT: -Currently on metoprolol 25 mg XL daily, valsartan 40 mg daily, spironolactone 25 mg daily, Farxiga 10 mg daily. -I do not feel that patient will be able to tolerate Entresto given his euvolemic volume status and possible cardiac amyloid. -Heart rate today 57.  Will not be able to uptitrate metoprolol.     - DIURETICS: Euvolemic on exam.  Can continue off of diuretics.  - LABS: -2/20/2024: Creatinine 0.8, K5.0.  -4/1/24: K4.3, CR 0.75, proBNP 435   - DEVICE: ICD placement by Dr Liang, no significant arrhythmias noted on recent interrogation   - ADVANCED THERAPIES: Overall, patient has good quality of life with good exercise tolerance.  He does not feel that there is anything to improve with his current symptoms. Patient has explored neuromodulation options and currently defers these. May want to consider further risk stratifying his cardiomyopathy with a CPET after his amyloid diagnosis is clarified.   #CAD s/p mLAD PCI -Remains on Plavix (off ASA to avoid triple therapy in setting of Eliquis) -Follows with Dr. Guerrero  #pAF -Heart rate well-controlled.  - Continue metoprolol 25 mg XL daily.  AC: Eliquis 5 mg twice daily.  #HTN -Appears controlled on above regimen  Follow-up with Dr. Leung in July and then with me (HF team) in September.

## 2024-05-16 RX ORDER — TAFAMIDIS 61 MG/1
61 CAPSULE, LIQUID FILLED ORAL
Qty: 90 | Refills: 3 | Status: ACTIVE | COMMUNITY
Start: 2024-05-15 | End: 1900-01-01

## 2024-06-17 ENCOUNTER — APPOINTMENT (OUTPATIENT)
Dept: CARDIOLOGY | Facility: CLINIC | Age: 84
End: 2024-06-17
Payer: MEDICARE

## 2024-06-17 ENCOUNTER — NON-APPOINTMENT (OUTPATIENT)
Age: 84
End: 2024-06-17

## 2024-06-17 PROCEDURE — 93296 REM INTERROG EVL PM/IDS: CPT

## 2024-06-17 PROCEDURE — 93295 DEV INTERROG REMOTE 1/2/MLT: CPT

## 2024-08-09 ENCOUNTER — APPOINTMENT (OUTPATIENT)
Dept: CARDIOLOGY | Facility: CLINIC | Age: 84
End: 2024-08-09

## 2024-08-10 ENCOUNTER — RX RENEWAL (OUTPATIENT)
Age: 84
End: 2024-08-10

## 2024-09-10 ENCOUNTER — APPOINTMENT (OUTPATIENT)
Dept: CARDIOLOGY | Facility: CLINIC | Age: 84
End: 2024-09-10

## 2024-09-11 ENCOUNTER — APPOINTMENT (OUTPATIENT)
Dept: CARDIOLOGY | Facility: CLINIC | Age: 84
End: 2024-09-11
Payer: MEDICARE

## 2024-09-11 ENCOUNTER — NON-APPOINTMENT (OUTPATIENT)
Age: 84
End: 2024-09-11

## 2024-09-11 VITALS
BODY MASS INDEX: 27.7 KG/M2 | DIASTOLIC BLOOD PRESSURE: 50 MMHG | SYSTOLIC BLOOD PRESSURE: 122 MMHG | OXYGEN SATURATION: 97 % | WEIGHT: 187 LBS | RESPIRATION RATE: 16 BRPM | HEIGHT: 69 IN | HEART RATE: 60 BPM

## 2024-09-11 DIAGNOSIS — I25.10 ATHEROSCLEROTIC HEART DISEASE OF NATIVE CORONARY ARTERY W/OUT ANGINA PECTORIS: ICD-10-CM

## 2024-09-11 DIAGNOSIS — I50.42 CHRONIC COMBINED SYSTOLIC (CONGESTIVE) AND DIASTOLIC (CONGESTIVE) HEART FAILURE: ICD-10-CM

## 2024-09-11 DIAGNOSIS — E85.9 AMYLOIDOSIS, UNSPECIFIED: ICD-10-CM

## 2024-09-11 DIAGNOSIS — Z95.5 PRESENCE OF CORONARY ANGIOPLASTY IMPLANT AND GRAFT: ICD-10-CM

## 2024-09-11 DIAGNOSIS — I48.0 PAROXYSMAL ATRIAL FIBRILLATION: ICD-10-CM

## 2024-09-11 PROCEDURE — 99215 OFFICE O/P EST HI 40 MIN: CPT

## 2024-09-11 PROCEDURE — 93000 ELECTROCARDIOGRAM COMPLETE: CPT

## 2024-09-11 NOTE — HISTORY OF PRESENT ILLNESS
[FreeTextEntry1] : 83 yo M with HFrEF, CAD s/p PCI to pLAD (3/2022), wt-TTR amyloid, pAF (on Eliquis), HTN, ETOH use x40-50 years presents today for heart failure follow-up.  Patient initially presented to [facility] in February 2022 with generalized fatigue and was found to be cardiogenic shock 2/2 MI requiring transfer to [facility] with IABP and milrinone infusion. LVEF at the time was 10-15%.  He underwent staged PCI on 3/4/2022 to his mid LAD he clinically improved and was discharged with a LifeVest.  LV function recovered only to 27% and he ultimately underwent ICD implant on 6/21/2022.   He did complete cardiac rehab.  He has remained stable without any further hospitalizations.  6MWT on 6/20/23 was 330m.  He was evaluated for neuromodulation therapy however he deferred.  More recently, patient was sent for carpal tunnel surgery.  Patient had heard about amyloid through a TV commercial and on his preoperative visit had requested surgical pathology be sent for amyloid testing.  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 PYP amyloid scan on 3/27/2024 was eqivocal with Grade 1 uptake, H: CL ratio of 1.28 (eqivocal).  AL amyloid labs were unremarkable.  cMRI on 5/3/2024 revealed LVEF 40%, with patchy myocardial scar involving mid myocardium of the basal septum, basal anterior wall and basal inferoseptum at RV insertion point.  Genetic testing for H-TTR was negative.  We started him on therapy with Vyndamax for wt-TTR amyloid in May 2024.  Today, he reports feeling well.  He is able to walk up and down steps as well as to his car without any difficulties.  He can perform all ADLs.  He denies any fluid overload symptoms.  He denies chest pain, palpitations, dyspnea at rest or exertion, orthopnea, leg swelling, changes in appetite, changes in weight, bendopnea, lightheadedness, dizziness, and syncope/pre-syncope.  He notes that he is trying to increase his exercise and lose some weight.

## 2024-09-11 NOTE — DISCUSSION/SUMMARY
[___ Month(s)] : in [unfilled] month(s) [EKG obtained to assist in diagnosis and management of assessed problem(s)] : EKG obtained to assist in diagnosis and management of assessed problem(s) [FreeTextEntry1] : # WT - TTR Amyloid -Surgical pathology from carpal tunnel surgery on 3/1/2024 was positive for amyloid with [place] red stain with apple green birefringence. - PYP amyloid scan 3/27/2024: Grade 1 uptake, H:CL ratio of 1.28.  This would be considered an equivocal result for cardiac TTR amyloid.  cardiac MRI does have some midmyocardial LGE in the basal septum, basilar anterior wall and basal inferoseptal and RV insertion point. -genetic testing 4/8/2024 negative for hereditary TTR amyloid. -AL labs: K/L ratio 1.53, K just slightly above ULN 20 (ULN 19).  No M spike present. 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 - continue with Vyndamax 60mg daily - started May 2024.  # HFrEF  - ETIOLOGY: Secondary to ischemic cardiomyopathy +/- cardiac amyloid. -Cardiac MRI with LVEF around 40%.   - GDMT: -Currently on metoprolol 25 mg XL daily, valsartan 40 mg daily, spironolactone 25 mg daily, Farxiga 10 mg daily. -I do not feel that patient will be able to tolerate Entresto given his euvolemic volume status and possible cardiac amyloid. -Heart rate today 60.  Will not be able to uptitrate metoprolol.     - DIURETICS: Euvolemic on exam.  Can continue off of diuretics.  - LABS: -2/20/2024: K5.0, Cr 0.8.  -4/1/24: K4.3, Cr 0.75, proBNP 435. -Will obtain repeat labs, CMP, magnesium and proBNP.   - DEVICE: ICD placement by Dr Liang on 6/1/22.  Frequent NSVT events, afib< 1%, no ATP/shocks. on June 2024 interrogation   - ADVANCED THERAPIES: Overall, patient has good quality of life with good exercise tolerance.  He does not feel that there is anything to improve with his current symptoms. Patient has explored neuromodulation options and currently defers these. Will repeat 6MWT for benchmarking.   #CAD s/p mLAD PCI (2022) -Remains on Plavix (off ASA to avoid triple therapy in setting of Eliquis) -Follows with Dr. Guerrero  #pAF -Heart rate well-controlled.  - Continue metoprolol 25 mg XL daily.  AC: Eliquis 5 mg twice daily.  #HTN -well controlled on above regimen.  # Preoperative evaluation -Patient planned for cataract surgery by Dr. Elizabeth.  -Would favor staying on both Eliquis and clopidogrel throughout the perioperative period unless there is any concerns from ophthalmology perspective.  If necessary, can stop Eliquis 3 days prior to surgery.  Would continue on Plavix throughout surgery regardless. - EKG today without new ischemic changes.  At present, there are no active cardiac conditions. Baseline functional status is acceptable. The clinical benefit of the proposed procedure outweighs the associated cardiovascular risk. Risk not attenuated with further CV testing. Prior testing as outlined above. Optimized from a cardiovascular perspective.  Follow-up with echo + 6MWT (don't need to be done before his surgery) then visit with me in 3 months.

## 2024-09-11 NOTE — CARDIOLOGY SUMMARY
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 6/13/23: SR with 1st degree AVB, PRWP, old anterior infarct 3/25/22: SR, LAFB, poor R wave progression, nonspecific ST abnormalities  2/11/22: appears sinus rhythm, low voltage in limb leads, LAFB, nonspecific ST abnormalities  [de-identified] : 9/21/2023: LVEF 30%, LVEDD 6.0 cm, IVSd 1.0 cm, normal RV, mild LAE, mod MR, mod TR, PASP 29, 9/7/2022 : LVEF 20-25%, LVEDD 6.2, IVSD 0.8 cm, mild MR, mild TR, PASP 36 mmHg. 5/18/22: Limited TTE with Lumason for LV assessment: LVEF 27%, severe LV systolic dysfunction, mild LV enlargement 2/2/22: LVEF <20%, LVIDd 6.3 cm, severely dilated LV, LV diastolic dysfunction, mildly dilated RA and RV, moderate dilated LA, mod TR, mod MR, mild pHTN, small pericardial effusion [de-identified] : cMRI 5/3/2024: LVEF 40%, RVEF 46%, DL E assessment limited due to artifact from ICD, patchy myocardial scar involving mid myocardium of the basal septum, basal anterior wall and basal inferoseptum at RV insertion point. [de-identified] : PYP amyloid scan 3/27/2024: Grade 1 uptake, H:CL ratio of 1.28 (eqivocal). [de-identified] : 6/21/22: s/p ICD [de-identified] : 3/24/22: [facility]: PCI to mLAD 2/2/22 [facility]: mLAD 70% stenosis however sig depressed CI with elevated LVEDP. IABP placed and pt transferred to [facility] [de-identified] : 6MWT 6/20/23: Walked 330 meters, Berta Scale 1 post test. Recovery VS: HR 63, /60, +dyspnea, SpO2 98% room air Genetic testing 4/8/2024: H TTR gene sequence negative.

## 2024-09-11 NOTE — PHYSICAL EXAM
[No Respiratory Distress] : no respiratory distress  [Normal] : soft, non-tender, no masses/organomegaly, normal bowel sounds [Normal Gait] : normal gait [No Edema] : no edema [No Rash] : no rash [Moves all extremities] : moves all extremities [Alert and Oriented] : alert and oriented [de-identified] : JVP at clavicle [de-identified] : warm

## 2024-09-11 NOTE — ASSESSMENT
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  He has ACC/AHA stage C cardiomyopathy with NYHA class I symptoms.

## 2024-09-12 ENCOUNTER — NON-APPOINTMENT (OUTPATIENT)
Age: 84
End: 2024-09-12

## 2024-09-12 ENCOUNTER — APPOINTMENT (OUTPATIENT)
Dept: OPHTHALMOLOGY | Facility: CLINIC | Age: 84
End: 2024-09-12
Payer: MEDICARE

## 2024-09-12 PROCEDURE — 99213 OFFICE O/P EST LOW 20 MIN: CPT

## 2024-09-12 PROCEDURE — 92136 OPHTHALMIC BIOMETRY: CPT

## 2024-09-15 ENCOUNTER — NON-APPOINTMENT (OUTPATIENT)
Age: 84
End: 2024-09-15

## 2024-09-16 ENCOUNTER — APPOINTMENT (OUTPATIENT)
Dept: CARDIOLOGY | Facility: CLINIC | Age: 84
End: 2024-09-16
Payer: MEDICARE

## 2024-09-16 PROCEDURE — 93295 DEV INTERROG REMOTE 1/2/MLT: CPT

## 2024-09-16 PROCEDURE — 93296 REM INTERROG EVL PM/IDS: CPT

## 2024-09-23 ENCOUNTER — NON-APPOINTMENT (OUTPATIENT)
Age: 84
End: 2024-09-23

## 2024-09-23 ENCOUNTER — APPOINTMENT (OUTPATIENT)
Dept: OPHTHALMOLOGY | Facility: HOSPITAL | Age: 84
End: 2024-09-23
Payer: MEDICARE

## 2024-09-23 PROCEDURE — 66984 XCAPSL CTRC RMVL W/O ECP: CPT | Mod: RT

## 2024-09-24 ENCOUNTER — APPOINTMENT (OUTPATIENT)
Dept: OPHTHALMOLOGY | Facility: CLINIC | Age: 84
End: 2024-09-24
Payer: MEDICARE

## 2024-09-24 ENCOUNTER — NON-APPOINTMENT (OUTPATIENT)
Age: 84
End: 2024-09-24

## 2024-09-24 PROCEDURE — 99024 POSTOP FOLLOW-UP VISIT: CPT

## 2024-10-10 ENCOUNTER — NON-APPOINTMENT (OUTPATIENT)
Age: 84
End: 2024-10-10

## 2024-10-10 ENCOUNTER — APPOINTMENT (OUTPATIENT)
Dept: OPHTHALMOLOGY | Facility: CLINIC | Age: 84
End: 2024-10-10
Payer: MEDICARE

## 2024-10-10 PROCEDURE — 99213 OFFICE O/P EST LOW 20 MIN: CPT | Mod: 24

## 2024-10-10 PROCEDURE — 92136 OPHTHALMIC BIOMETRY: CPT | Mod: 26,LT

## 2024-10-28 ENCOUNTER — NON-APPOINTMENT (OUTPATIENT)
Age: 84
End: 2024-10-28

## 2024-10-28 ENCOUNTER — APPOINTMENT (OUTPATIENT)
Dept: OPHTHALMOLOGY | Facility: HOSPITAL | Age: 84
End: 2024-10-28

## 2024-10-28 PROCEDURE — 66984 XCAPSL CTRC RMVL W/O ECP: CPT | Mod: 79,LT

## 2024-10-29 ENCOUNTER — NON-APPOINTMENT (OUTPATIENT)
Age: 84
End: 2024-10-29

## 2024-10-29 ENCOUNTER — APPOINTMENT (OUTPATIENT)
Dept: OPHTHALMOLOGY | Facility: CLINIC | Age: 84
End: 2024-10-29
Payer: MEDICARE

## 2024-10-29 PROCEDURE — 99024 POSTOP FOLLOW-UP VISIT: CPT

## 2024-11-07 ENCOUNTER — APPOINTMENT (OUTPATIENT)
Dept: OPHTHALMOLOGY | Facility: CLINIC | Age: 84
End: 2024-11-07
Payer: MEDICARE

## 2024-11-07 ENCOUNTER — NON-APPOINTMENT (OUTPATIENT)
Age: 84
End: 2024-11-07

## 2024-11-07 PROCEDURE — 99024 POSTOP FOLLOW-UP VISIT: CPT

## 2024-12-08 NOTE — PATIENT PROFILE ADULT - FUNCTIONAL ASSESSMENT - DAILY ACTIVITY 5.
Problem: PAIN - ADULT  Goal: Verbalizes/displays adequate comfort level or baseline comfort level  Description: Interventions:  - Encourage patient to monitor pain and request assistance  - Assess pain using appropriate pain scale  - Administer analgesics based on type and severity of pain and evaluate response  - Implement non-pharmacological measures as appropriate and evaluate response  - Consider cultural and social influences on pain and pain management  - Notify physician/advanced practitioner if interventions unsuccessful or patient reports new pain  Outcome: Progressing     Problem: INFECTION - ADULT  Goal: Absence or prevention of progression during hospitalization  Description: INTERVENTIONS:  - Assess and monitor for signs and symptoms of infection  - Monitor lab/diagnostic results  - Monitor all insertion sites, i.e. indwelling lines, tubes, and drains  - Monitor endotracheal if appropriate and nasal secretions for changes in amount and color  - [place] appropriate cooling/warming therapies per order  - Administer medications as ordered  - Instruct and encourage patient and family to use good hand hygiene technique  - Identify and instruct in appropriate isolation precautions for identified infection/condition  Outcome: Progressing  Goal: Absence of fever/infection during neutropenic period  Description: INTERVENTIONS:  - Monitor WBC    Outcome: Progressing     Problem: SAFETY ADULT  Goal: Patient will remain free of falls  Description: INTERVENTIONS:  - Educate patient/family on patient safety including physical limitations  - Instruct patient to call for assistance with activity   - Consult OT/PT to assist with strengthening/mobility   - Keep Call bell within reach  - Keep bed low and locked with side rails adjusted as appropriate  - Keep care items and personal belongings within reach  - Initiate and maintain comfort rounds  - Make Fall Risk Sign visible to staff  - Offer Toileting every 2 Hours,  in advance of need  - Initiate/Maintain bed alarm  - Apply yellow socks and bracelet for high fall risk patients  - Consider moving patient to room near nurses station  Outcome: Progressing  Goal: Maintain or return to baseline ADL function  Description: INTERVENTIONS:  -  Assess patient's ability to carry out ADLs; assess patient's baseline for ADL function and identify physical deficits which impact ability to perform ADLs (bathing, care of mouth/teeth, toileting, grooming, dressing, etc.)  - Assess/evaluate cause of self-care deficits   - Assess range of motion  - Assess patient's mobility; develop plan if impaired  - Assess patient's need for assistive devices and provide as appropriate  - Encourage maximum independence but intervene and supervise when necessary  - Involve family in performance of ADLs  - Assess for home care needs following discharge   - Consider OT consult to assist with ADL evaluation and planning for discharge  - Provide patient education as appropriate  Outcome: Progressing  Goal: Maintains/Returns to pre admission functional level  Description: INTERVENTIONS:  - Perform AM-PAC 6 Click Basic Mobility/ Daily Activity assessment daily.  - Set and communicate daily mobility goal to care team and patient/family/caregiver.   - Collaborate with rehabilitation services on mobility goals if consulted  - Perform Range of Motion 3 times a day.  - Reposition patient every 2 hours.  - Dangle patient 3 times a day  - Stand patient 2 times a day  - Ambulate patient 3 times a day  - Out of bed to chair 2 times a day   - Out of bed for meals 3 times a day  - Out of bed for toileting  - Record patient progress and toleration of activity level   Outcome: Progressing     Problem: DISCHARGE PLANNING  Goal: Discharge to home or other facility with appropriate resources  Description: INTERVENTIONS:  - Identify barriers to discharge w/patient and caregiver  - Arrange for needed discharge resources and  transportation as appropriate  - Identify discharge learning needs (meds, wound care, etc.)  - Arrange for interpretive services to assist at discharge as needed  - Refer to Case Management Department for coordinating discharge planning if the patient needs post-hospital services based on physician/advanced practitioner order or complex needs related to functional status, cognitive ability, or social support system  Outcome: Progressing     Problem: Knowledge Deficit  Goal: Patient/family/caregiver demonstrates understanding of disease process, treatment plan, medications, and discharge instructions  Description: Complete learning assessment and assess knowledge base.  Interventions:  - Provide teaching at level of understanding  - Provide teaching via preferred learning methods  Outcome: Progressing     Problem: NEUROSENSORY - ADULT  Goal: Achieves stable or improved neurological status  Description: INTERVENTIONS  - Monitor and report changes in neurological status  - Monitor vital signs such as temperature, blood pressure, glucose, and any other labs ordered   - Initiate measures to prevent increased intracranial pressure  - Monitor for seizure activity and implement precautions if appropriate      Outcome: Progressing  Goal: Remains free of injury related to seizures activity  Description: INTERVENTIONS  - Maintain airway, patient safety  and administer oxygen as ordered  - Monitor patient for seizure activity, document and report duration and description of seizure to physician/advanced practitioner  - If seizure occurs,  ensure patient safety during seizure  - Reorient patient post seizure  - Seizure pads on all 4 side rails  - Instruct patient/family to notify RN of any seizure activity including if an aura is experienced  - Instruct patient/family to call for assistance with activity based on nursing assessment  - Administer anti-seizure medications if ordered    Outcome: Progressing  Goal: Achieves maximal  functionality and self care  Description: INTERVENTIONS  - Monitor swallowing and airway patency with patient fatigue and changes in neurological status  - Encourage and assist patient to increase activity and self care.   - Encourage visually impaired, hearing impaired and aphasic patients to use assistive/communication devices  Outcome: Progressing     Problem: CARDIOVASCULAR - ADULT  Goal: Maintains optimal cardiac output and hemodynamic stability  Description: INTERVENTIONS:  - Monitor I/O, vital signs and rhythm  - Monitor for S/S and trends of decreased cardiac output  - Administer and titrate ordered vasoactive medications to optimize hemodynamic stability  - Assess quality of pulses, skin color and temperature  - Assess for signs of decreased coronary artery perfusion  - Instruct patient to report change in severity of symptoms  Outcome: Progressing  Goal: Absence of cardiac dysrhythmias or at baseline rhythm  Description: INTERVENTIONS:  - Continuous cardiac monitoring, vital signs, obtain 12 lead EKG if ordered  - Administer antiarrhythmic and heart rate control medications as ordered  - Monitor electrolytes and administer replacement therapy as ordered  Outcome: Progressing     Problem: RESPIRATORY - ADULT  Goal: Achieves optimal ventilation and oxygenation  Description: INTERVENTIONS:  - Assess for changes in respiratory status  - Assess for changes in mentation and behavior  - Position to facilitate oxygenation and minimize respiratory effort  - Oxygen administered by appropriate delivery if ordered  - Initiate smoking cessation education as indicated  - Encourage broncho-pulmonary hygiene including cough, deep breathe, Incentive Spirometry  - Assess the need for suctioning and aspirate as needed  - Assess and instruct to report SOB or any respiratory difficulty  - Respiratory Therapy support as indicated  Outcome: Progressing     Problem: GASTROINTESTINAL - ADULT  Goal: Minimal or absence of nausea  and/or vomiting  Description: INTERVENTIONS:  - Administer IV fluids if ordered to ensure adequate hydration  - Maintain NPO status until nausea and vomiting are resolved  - Nasogastric tube if ordered  - Administer ordered antiemetic medications as needed  - Provide nonpharmacologic comfort measures as appropriate  - Advance diet as tolerated, if ordered  - Consider nutrition services referral to assist patient with adequate nutrition and appropriate food choices  Outcome: Progressing  Goal: Maintains or returns to baseline bowel function  Description: INTERVENTIONS:  - Assess bowel function  - Encourage oral fluids to ensure adequate hydration  - Administer IV fluids if ordered to ensure adequate hydration  - Administer ordered medications as needed  - Encourage mobilization and activity  - Consider nutritional services referral to assist patient with adequate nutrition and appropriate food choices  Outcome: Progressing  Goal: Maintains adequate nutritional intake  Description: INTERVENTIONS:  - Monitor percentage of each meal consumed  - Identify factors contributing to decreased intake, treat as appropriate  - Assist with meals as needed  - Monitor I&O, weight, and lab values if indicated  - Obtain nutrition services referral as needed  Outcome: Progressing  Goal: Establish and maintain optimal ostomy function  Description: INTERVENTIONS:  - Assess bowel function  - Encourage oral fluids to ensure adequate hydration  - Administer IV fluids if ordered to ensure adequate hydration   - Administer ordered medications as needed  - Encourage mobilization and activity  - Nutrition services referral to assist patient with appropriate food choices  - Assess stoma site  - Consider wound care consult   Outcome: Progressing  Goal: Oral mucous membranes remain intact  Description: INTERVENTIONS  - Assess oral mucosa and hygiene practices  - Implement preventative oral hygiene regimen  - Implement oral medicated treatments as  ordered  - Initiate Nutrition services referral as needed  Outcome: Progressing     Problem: GENITOURINARY - ADULT  Goal: Maintains or returns to baseline urinary function  Description: INTERVENTIONS:  - Assess urinary function  - Encourage oral fluids to ensure adequate hydration if ordered  - Administer IV fluids as ordered to ensure adequate hydration  - Administer ordered medications as needed  - Offer frequent toileting  - Follow urinary retention protocol if ordered  Outcome: Progressing  Goal: Absence of urinary retention  Description: INTERVENTIONS:  - Assess patient’s ability to void and empty bladder  - Monitor I/O  - Bladder scan as needed  - Discuss with physician/AP medications to alleviate retention as needed  - Discuss catheterization for long term situations as appropriate  Outcome: Progressing  Goal: Urinary catheter remains patent  Description: INTERVENTIONS:  - Assess patency of urinary catheter  - If patient has a chronic peace, consider changing catheter if non-functioning  - Follow guidelines for intermittent irrigation of non-functioning urinary catheter  Outcome: Progressing     Problem: METABOLIC, FLUID AND ELECTROLYTES - ADULT  Goal: Electrolytes maintained within normal limits  Description: INTERVENTIONS:  - Monitor labs and assess patient for signs and symptoms of electrolyte imbalances  - Administer electrolyte replacement as ordered  - Monitor response to electrolyte replacements, including repeat lab results as appropriate  - Instruct patient on fluid and nutrition as appropriate  Outcome: Progressing  Goal: Fluid balance maintained  Description: INTERVENTIONS:  - Monitor labs   - Monitor I/O and WT  - Instruct patient on fluid and nutrition as appropriate  - Assess for signs & symptoms of volume excess or deficit  Outcome: Progressing  Goal: Glucose maintained within target range  Description: INTERVENTIONS:  - Monitor Blood Glucose as ordered  - Assess for signs and symptoms of  hyperglycemia and hypoglycemia  - Administer ordered medications to maintain glucose within target range  - Assess nutritional intake and initiate nutrition service referral as needed  Outcome: Progressing     Problem: SKIN/TISSUE INTEGRITY - ADULT  Goal: Skin Integrity remains intact(Skin Breakdown Prevention)  Description: Assess:  -Perform [place] assessment every shift  -Clean and moisturize skin  -Inspect skin when repositioning, toileting, and assisting with ADLS  -Assess extremities for adequate circulation and sensation     Bed Management:  -Have minimal linens on bed & keep smooth, unwrinkled  -Change linens as needed when moist or perspiring  -Avoid sitting or lying in one position for more than 2 hours while in bed    Toileting:  -Offer bedside commode  -Assess for incontinence    Activity:  -Encourage activity and walks on unit  -Encourage or provide ROM exercises   -Turn and reposition patient every 2 Hours  -Use appropriate equipment to lift or move patient in bed    Skin Care:  -Avoid use of baby powder, tape, friction and shearing, hot water or constrictive clothing  -Relieve pressure over bony prominences  -Do not massage red bony areas    Outcome: Progressing  Goal: Incision(s), wounds(s) or drain site(s) healing without S/S of infection  Description: INTERVENTIONS  - Assess and document dressing, incision, wound bed, drain sites and surrounding tissue  - Provide patient and family education  Outcome: Progressing  Goal: Pressure injury heals and does not worsen  Description: Interventions:  - Implement low air loss mattress or specialty surface (Criteria met)  - Apply silicone foam dressing  - Apply fecal or urinary incontinence containment device   - Turn and reposition patient & offload bony prominences every 2 hours   - Utilize friction reducing device or surface for transfers   - Use incontinent care products after each incontinent episode.  - Consider nutrition services referral as  needed  Outcome: Progressing     Problem: HEMATOLOGIC - ADULT  Goal: Maintains hematologic stability  Description: INTERVENTIONS  - Assess for signs and symptoms of bleeding or hemorrhage  - Monitor labs  - Administer supportive blood products/factors as ordered and appropriate  Outcome: Progressing     Problem: MUSCULOSKELETAL - ADULT  Goal: Maintain or return mobility to safest level of function  Description: INTERVENTIONS:  - Assess patient's ability to carry out ADLs; assess patient's baseline for ADL function and identify physical deficits which impact ability to perform ADLs (bathing, care of mouth/teeth, toileting, grooming, dressing, etc.)  - Assess/evaluate cause of self-care deficits   - Assess range of motion  - Assess patient's mobility  - Assess patient's need for assistive devices and provide as appropriate  - Encourage maximum independence but intervene and supervise when necessary  - Involve family in performance of ADLs  - Assess for home care needs following discharge   - Consider OT consult to assist with ADL evaluation and planning for discharge  - Provide patient education as appropriate  Outcome: Progressing  Goal: Maintain proper alignment of affected body part  Description: INTERVENTIONS:  - Support, maintain and protect limb and body alignment  - Provide patient/ family with appropriate education  Outcome: Progressing     Problem: Nutrition/Hydration-ADULT  Goal: Nutrient/Hydration intake appropriate for improving, restoring or maintaining nutritional needs  Description: Monitor and assess patient's nutrition/hydration status for malnutrition. Collaborate with interdisciplinary team and initiate plan and interventions as ordered.  Monitor patient's weight and dietary intake as ordered or per policy. Utilize nutrition screening tool and intervene as necessary. Determine patient's food preferences and provide high-protein, high-caloric foods as appropriate.     INTERVENTIONS:  - Monitor oral  intake, urinary output, labs, and treatment plans  - Assess nutrition and hydration status and recommend course of action  - Evaluate amount of meals eaten  - Assist patient with eating if necessary   - Allow adequate time for meals  - Recommend/ encourage appropriate diets, oral nutritional supplements, and vitamin/mineral supplements  - Order, calculate, and assess calorie counts as needed  - Recommend, monitor, and adjust tube feedings and TPN/PPN based on assessed needs  - Assess need for intravenous fluids  - Provide specific nutrition/hydration education as appropriate  - Include patient/family/caregiver in decisions related to nutrition  Outcome: Progressing     Problem: Prexisting or High Potential for Compromised Skin Integrity  Goal: Skin integrity is maintained or improved  Description: INTERVENTIONS:  - Identify patients at risk for skin breakdown  - Assess and monitor skin integrity  - Assess and monitor nutrition and hydration status  - Monitor labs   - Assess for incontinence   - Turn and reposition patient  - Assist with mobility/ambulation  - Relieve pressure over bony prominences  - Avoid friction and shearing  - Provide appropriate hygiene as needed including keeping skin clean and dry  - Evaluate need for skin moisturizer/barrier cream  - Collaborate with interdisciplinary team   - Patient/family teaching  - Consider wound care consult   Outcome: Progressing      4 = No assist / stand by assistance

## 2024-12-12 ENCOUNTER — APPOINTMENT (OUTPATIENT)
Dept: OPHTHALMOLOGY | Facility: CLINIC | Age: 84
End: 2024-12-12
Payer: MEDICARE

## 2024-12-12 ENCOUNTER — APPOINTMENT (OUTPATIENT)
Dept: OPHTHALMOLOGY | Facility: CLINIC | Age: 84
End: 2024-12-12
Payer: SELF-PAY

## 2024-12-12 ENCOUNTER — NON-APPOINTMENT (OUTPATIENT)
Age: 84
End: 2024-12-12

## 2024-12-12 PROCEDURE — 99024 POSTOP FOLLOW-UP VISIT: CPT

## 2024-12-12 PROCEDURE — 92015 DETERMINE REFRACTIVE STATE: CPT

## 2024-12-13 ENCOUNTER — APPOINTMENT (OUTPATIENT)
Dept: CARDIOLOGY | Facility: CLINIC | Age: 84
End: 2024-12-13

## 2024-12-14 ENCOUNTER — RX RENEWAL (OUTPATIENT)
Age: 84
End: 2024-12-14

## 2024-12-16 ENCOUNTER — NON-APPOINTMENT (OUTPATIENT)
Age: 84
End: 2024-12-16

## 2024-12-16 ENCOUNTER — APPOINTMENT (OUTPATIENT)
Dept: CARDIOLOGY | Facility: CLINIC | Age: 84
End: 2024-12-16
Payer: MEDICARE

## 2024-12-16 PROCEDURE — 93296 REM INTERROG EVL PM/IDS: CPT

## 2024-12-16 PROCEDURE — 93295 DEV INTERROG REMOTE 1/2/MLT: CPT

## 2024-12-21 NOTE — PATIENT PROFILE ADULT - CAREGIVER NAME
Goal Outcome Evaluation:  Plan of Care Reviewed With: patient        Progress: no change                              Various dressing changes done. Patient has refused to be turned and tele, forms signed. No distress noted.    Nilda Gallego

## 2025-01-25 ENCOUNTER — RX RENEWAL (OUTPATIENT)
Age: 85
End: 2025-01-25

## 2025-02-14 ENCOUNTER — APPOINTMENT (OUTPATIENT)
Dept: CARDIOLOGY | Facility: CLINIC | Age: 85
End: 2025-02-14
Payer: MEDICARE

## 2025-02-14 ENCOUNTER — NON-APPOINTMENT (OUTPATIENT)
Age: 85
End: 2025-02-14

## 2025-02-14 VITALS
BODY MASS INDEX: 27.32 KG/M2 | DIASTOLIC BLOOD PRESSURE: 62 MMHG | OXYGEN SATURATION: 97 % | SYSTOLIC BLOOD PRESSURE: 106 MMHG | WEIGHT: 185 LBS | HEART RATE: 73 BPM

## 2025-02-14 DIAGNOSIS — E85.9 AMYLOIDOSIS, UNSPECIFIED: ICD-10-CM

## 2025-02-14 DIAGNOSIS — I25.10 ATHEROSCLEROTIC HEART DISEASE OF NATIVE CORONARY ARTERY W/OUT ANGINA PECTORIS: ICD-10-CM

## 2025-02-14 DIAGNOSIS — I48.0 PAROXYSMAL ATRIAL FIBRILLATION: ICD-10-CM

## 2025-02-14 DIAGNOSIS — I50.42 CHRONIC COMBINED SYSTOLIC (CONGESTIVE) AND DIASTOLIC (CONGESTIVE) HEART FAILURE: ICD-10-CM

## 2025-02-14 DIAGNOSIS — I50.20 UNSPECIFIED SYSTOLIC (CONGESTIVE) HEART FAILURE: ICD-10-CM

## 2025-02-14 PROCEDURE — 99215 OFFICE O/P EST HI 40 MIN: CPT | Mod: 25

## 2025-02-14 PROCEDURE — 93306 TTE W/DOPPLER COMPLETE: CPT

## 2025-02-14 PROCEDURE — 94618 PULMONARY STRESS TESTING: CPT

## 2025-02-15 ENCOUNTER — RX RENEWAL (OUTPATIENT)
Age: 85
End: 2025-02-15

## 2025-02-27 ENCOUNTER — RX RENEWAL (OUTPATIENT)
Age: 85
End: 2025-02-27

## 2025-03-03 ENCOUNTER — NON-APPOINTMENT (OUTPATIENT)
Age: 85
End: 2025-03-03

## 2025-03-04 ENCOUNTER — APPOINTMENT (OUTPATIENT)
Dept: CARDIOLOGY | Facility: CLINIC | Age: 85
End: 2025-03-04
Payer: MEDICARE

## 2025-03-04 VITALS
HEIGHT: 69 IN | BODY MASS INDEX: 27.4 KG/M2 | SYSTOLIC BLOOD PRESSURE: 114 MMHG | HEART RATE: 83 BPM | OXYGEN SATURATION: 96 % | WEIGHT: 185 LBS | DIASTOLIC BLOOD PRESSURE: 70 MMHG

## 2025-03-04 DIAGNOSIS — E85.9 AMYLOIDOSIS, UNSPECIFIED: ICD-10-CM

## 2025-03-04 DIAGNOSIS — I50.42 CHRONIC COMBINED SYSTOLIC (CONGESTIVE) AND DIASTOLIC (CONGESTIVE) HEART FAILURE: ICD-10-CM

## 2025-03-04 PROCEDURE — 93283 PRGRMG EVAL IMPLANTABLE DFB: CPT

## 2025-03-13 ENCOUNTER — APPOINTMENT (OUTPATIENT)
Dept: OPHTHALMOLOGY | Facility: CLINIC | Age: 85
End: 2025-03-13

## 2025-03-17 ENCOUNTER — APPOINTMENT (OUTPATIENT)
Dept: CARDIOLOGY | Facility: CLINIC | Age: 85
End: 2025-03-17
Payer: MEDICARE

## 2025-03-17 ENCOUNTER — NON-APPOINTMENT (OUTPATIENT)
Age: 85
End: 2025-03-17

## 2025-03-17 PROCEDURE — 93295 DEV INTERROG REMOTE 1/2/MLT: CPT

## 2025-03-17 PROCEDURE — 93296 REM INTERROG EVL PM/IDS: CPT

## 2025-03-19 ENCOUNTER — APPOINTMENT (OUTPATIENT)
Dept: CARDIOLOGY | Facility: CLINIC | Age: 85
End: 2025-03-19
Payer: MEDICARE

## 2025-03-19 VITALS
BODY MASS INDEX: 26.88 KG/M2 | WEIGHT: 182 LBS | SYSTOLIC BLOOD PRESSURE: 84 MMHG | OXYGEN SATURATION: 97 % | HEART RATE: 82 BPM | DIASTOLIC BLOOD PRESSURE: 54 MMHG

## 2025-03-19 VITALS
OXYGEN SATURATION: 97 % | SYSTOLIC BLOOD PRESSURE: 84 MMHG | WEIGHT: 182 LBS | DIASTOLIC BLOOD PRESSURE: 54 MMHG | HEART RATE: 84 BPM | BODY MASS INDEX: 26.88 KG/M2

## 2025-03-19 DIAGNOSIS — I48.92 UNSPECIFIED ATRIAL FLUTTER: ICD-10-CM

## 2025-03-19 DIAGNOSIS — I48.0 PAROXYSMAL ATRIAL FIBRILLATION: ICD-10-CM

## 2025-03-19 DIAGNOSIS — I25.10 ATHEROSCLEROTIC HEART DISEASE OF NATIVE CORONARY ARTERY W/OUT ANGINA PECTORIS: ICD-10-CM

## 2025-03-19 DIAGNOSIS — E85.9 AMYLOIDOSIS, UNSPECIFIED: ICD-10-CM

## 2025-03-19 DIAGNOSIS — I50.42 CHRONIC COMBINED SYSTOLIC (CONGESTIVE) AND DIASTOLIC (CONGESTIVE) HEART FAILURE: ICD-10-CM

## 2025-03-19 DIAGNOSIS — I50.20 UNSPECIFIED SYSTOLIC (CONGESTIVE) HEART FAILURE: ICD-10-CM

## 2025-03-19 DIAGNOSIS — I51.7 CARDIOMEGALY: ICD-10-CM

## 2025-03-19 PROCEDURE — 93283 PRGRMG EVAL IMPLANTABLE DFB: CPT

## 2025-03-19 PROCEDURE — 99215 OFFICE O/P EST HI 40 MIN: CPT

## 2025-03-19 PROCEDURE — G2211 COMPLEX E/M VISIT ADD ON: CPT

## 2025-03-19 RX ORDER — AMIODARONE HYDROCHLORIDE 200 MG/1
200 TABLET ORAL DAILY
Qty: 90 | Refills: 1 | Status: ACTIVE | COMMUNITY
Start: 2025-03-19 | End: 1900-01-01

## 2025-04-09 ENCOUNTER — OUTPATIENT (OUTPATIENT)
Dept: OUTPATIENT SERVICES | Facility: HOSPITAL | Age: 85
LOS: 1 days | End: 2025-04-09

## 2025-04-09 ENCOUNTER — APPOINTMENT (OUTPATIENT)
Dept: INTERNAL MEDICINE | Facility: CLINIC | Age: 85
End: 2025-04-09

## 2025-05-07 ENCOUNTER — NON-APPOINTMENT (OUTPATIENT)
Age: 85
End: 2025-05-07

## 2025-05-07 ENCOUNTER — APPOINTMENT (OUTPATIENT)
Dept: CARDIOLOGY | Facility: CLINIC | Age: 85
End: 2025-05-07
Payer: MEDICARE

## 2025-05-07 ENCOUNTER — APPOINTMENT (OUTPATIENT)
Dept: ELECTROPHYSIOLOGY | Facility: CLINIC | Age: 85
End: 2025-05-07

## 2025-05-07 VITALS
SYSTOLIC BLOOD PRESSURE: 122 MMHG | DIASTOLIC BLOOD PRESSURE: 60 MMHG | HEIGHT: 69 IN | WEIGHT: 187 LBS | BODY MASS INDEX: 27.7 KG/M2 | HEART RATE: 60 BPM | OXYGEN SATURATION: 95 %

## 2025-05-07 DIAGNOSIS — I25.10 ATHEROSCLEROTIC HEART DISEASE OF NATIVE CORONARY ARTERY W/OUT ANGINA PECTORIS: ICD-10-CM

## 2025-05-07 DIAGNOSIS — I50.20 UNSPECIFIED SYSTOLIC (CONGESTIVE) HEART FAILURE: ICD-10-CM

## 2025-05-07 DIAGNOSIS — E85.9 AMYLOIDOSIS, UNSPECIFIED: ICD-10-CM

## 2025-05-07 DIAGNOSIS — I48.0 PAROXYSMAL ATRIAL FIBRILLATION: ICD-10-CM

## 2025-05-07 DIAGNOSIS — Z95.5 PRESENCE OF CORONARY ANGIOPLASTY IMPLANT AND GRAFT: ICD-10-CM

## 2025-05-07 PROCEDURE — 93000 ELECTROCARDIOGRAM COMPLETE: CPT

## 2025-05-07 PROCEDURE — 99215 OFFICE O/P EST HI 40 MIN: CPT

## 2025-05-07 PROCEDURE — G2211 COMPLEX E/M VISIT ADD ON: CPT

## 2025-05-07 PROCEDURE — 99214 OFFICE O/P EST MOD 30 MIN: CPT

## 2025-05-10 ENCOUNTER — RX RENEWAL (OUTPATIENT)
Age: 85
End: 2025-05-10

## 2025-05-16 RX ORDER — VUTRISIRAN 25 MG/.5ML
25 INJECTION SUBCUTANEOUS
Qty: 0.5 | Refills: 3 | Status: ACTIVE | COMMUNITY
Start: 2025-05-16 | End: 1900-01-01

## 2025-05-28 ENCOUNTER — OUTPATIENT (OUTPATIENT)
Dept: OUTPATIENT SERVICES | Facility: HOSPITAL | Age: 85
LOS: 1 days | End: 2025-05-28

## 2025-05-28 ENCOUNTER — APPOINTMENT (OUTPATIENT)
Dept: INTERNAL MEDICINE | Facility: CLINIC | Age: 85
End: 2025-05-28

## 2025-06-06 ENCOUNTER — APPOINTMENT (OUTPATIENT)
Dept: CARDIOLOGY | Facility: CLINIC | Age: 85
End: 2025-06-06
Payer: MEDICARE

## 2025-06-06 VITALS
HEART RATE: 60 BPM | OXYGEN SATURATION: 97 % | HEIGHT: 69 IN | DIASTOLIC BLOOD PRESSURE: 56 MMHG | BODY MASS INDEX: 27.25 KG/M2 | SYSTOLIC BLOOD PRESSURE: 110 MMHG | WEIGHT: 184 LBS

## 2025-06-06 PROCEDURE — 99214 OFFICE O/P EST MOD 30 MIN: CPT

## 2025-06-06 RX ORDER — VUTRISIRAN 25 MG/.5ML
25 INJECTION SUBCUTANEOUS
Qty: 0 | Refills: 0 | Status: COMPLETED | OUTPATIENT
Start: 2025-06-06

## 2025-06-06 RX ORDER — MULTIVIT-MINERALS/FA/LYCOPENE 400-370MCG
TABLET ORAL DAILY
Refills: 0 | Status: ACTIVE | COMMUNITY
Start: 2025-06-06

## 2025-06-06 RX ADMIN — VUTRISIRAN 0.5 MG/0.5ML: 25 INJECTION SUBCUTANEOUS at 00:00

## 2025-06-16 ENCOUNTER — NON-APPOINTMENT (OUTPATIENT)
Age: 85
End: 2025-06-16

## 2025-06-16 ENCOUNTER — APPOINTMENT (OUTPATIENT)
Dept: CARDIOLOGY | Facility: CLINIC | Age: 85
End: 2025-06-16
Payer: MEDICARE

## 2025-06-16 PROCEDURE — 93295 DEV INTERROG REMOTE 1/2/MLT: CPT

## 2025-06-16 PROCEDURE — 93296 REM INTERROG EVL PM/IDS: CPT

## 2025-06-30 ENCOUNTER — APPOINTMENT (OUTPATIENT)
Dept: OPHTHALMOLOGY | Facility: CLINIC | Age: 85
End: 2025-06-30

## 2025-08-15 ENCOUNTER — APPOINTMENT (OUTPATIENT)
Dept: ELECTROPHYSIOLOGY | Facility: CLINIC | Age: 85
End: 2025-08-15
Payer: MEDICARE

## 2025-08-15 ENCOUNTER — APPOINTMENT (OUTPATIENT)
Dept: CARDIOLOGY | Facility: CLINIC | Age: 85
End: 2025-08-15
Payer: MEDICARE

## 2025-08-15 VITALS
WEIGHT: 181 LBS | HEIGHT: 69 IN | HEART RATE: 60 BPM | BODY MASS INDEX: 26.81 KG/M2 | DIASTOLIC BLOOD PRESSURE: 70 MMHG | SYSTOLIC BLOOD PRESSURE: 124 MMHG | OXYGEN SATURATION: 95 %

## 2025-08-15 DIAGNOSIS — I50.20 UNSPECIFIED SYSTOLIC (CONGESTIVE) HEART FAILURE: ICD-10-CM

## 2025-08-15 DIAGNOSIS — Z95.810 PRESENCE OF AUTOMATIC (IMPLANTABLE) CARDIAC DEFIBRILLATOR: ICD-10-CM

## 2025-08-15 DIAGNOSIS — I48.0 PAROXYSMAL ATRIAL FIBRILLATION: ICD-10-CM

## 2025-08-15 DIAGNOSIS — I25.10 ATHEROSCLEROTIC HEART DISEASE OF NATIVE CORONARY ARTERY W/OUT ANGINA PECTORIS: ICD-10-CM

## 2025-08-15 DIAGNOSIS — I10 ESSENTIAL (PRIMARY) HYPERTENSION: ICD-10-CM

## 2025-08-15 DIAGNOSIS — E85.9 AMYLOIDOSIS, UNSPECIFIED: ICD-10-CM

## 2025-08-15 DIAGNOSIS — Z95.5 PRESENCE OF CORONARY ANGIOPLASTY IMPLANT AND GRAFT: ICD-10-CM

## 2025-08-15 PROCEDURE — 93000 ELECTROCARDIOGRAM COMPLETE: CPT

## 2025-08-15 PROCEDURE — 99204 OFFICE O/P NEW MOD 45 MIN: CPT

## 2025-08-15 PROCEDURE — 99205 OFFICE O/P NEW HI 60 MIN: CPT

## 2025-08-15 PROCEDURE — G2211 COMPLEX E/M VISIT ADD ON: CPT

## 2025-09-05 ENCOUNTER — APPOINTMENT (OUTPATIENT)
Dept: CARDIOLOGY | Facility: CLINIC | Age: 85
End: 2025-09-05
Payer: MEDICARE

## 2025-09-05 VITALS
OXYGEN SATURATION: 96 % | HEIGHT: 69 IN | HEART RATE: 61 BPM | SYSTOLIC BLOOD PRESSURE: 112 MMHG | DIASTOLIC BLOOD PRESSURE: 74 MMHG | BODY MASS INDEX: 26.51 KG/M2 | WEIGHT: 179 LBS

## 2025-09-05 PROCEDURE — 99214 OFFICE O/P EST MOD 30 MIN: CPT
